# Patient Record
Sex: MALE | Race: WHITE | NOT HISPANIC OR LATINO | Employment: OTHER | URBAN - METROPOLITAN AREA
[De-identification: names, ages, dates, MRNs, and addresses within clinical notes are randomized per-mention and may not be internally consistent; named-entity substitution may affect disease eponyms.]

---

## 2017-01-01 ENCOUNTER — OFFICE VISIT (OUTPATIENT)
Dept: CARDIOLOGY | Facility: CLINIC | Age: 72
End: 2017-01-01

## 2017-01-01 ENCOUNTER — PATIENT OUTREACH (OUTPATIENT)
Dept: CASE MANAGEMENT | Facility: OTHER | Age: 72
End: 2017-01-01

## 2017-01-01 ENCOUNTER — TELEPHONE (OUTPATIENT)
Dept: FAMILY MEDICINE CLINIC | Facility: CLINIC | Age: 72
End: 2017-01-01

## 2017-01-01 ENCOUNTER — APPOINTMENT (OUTPATIENT)
Dept: CT IMAGING | Facility: HOSPITAL | Age: 72
End: 2017-01-01

## 2017-01-01 ENCOUNTER — APPOINTMENT (OUTPATIENT)
Dept: CARDIAC REHAB | Facility: HOSPITAL | Age: 72
End: 2017-01-01

## 2017-01-01 ENCOUNTER — OFFICE VISIT (OUTPATIENT)
Dept: FAMILY MEDICINE CLINIC | Facility: CLINIC | Age: 72
End: 2017-01-01

## 2017-01-01 ENCOUNTER — APPOINTMENT (OUTPATIENT)
Dept: CARDIOLOGY | Facility: HOSPITAL | Age: 72
End: 2017-01-01
Attending: INTERNAL MEDICINE

## 2017-01-01 ENCOUNTER — HOSPITAL ENCOUNTER (INPATIENT)
Facility: HOSPITAL | Age: 72
LOS: 3 days | Discharge: HOME | End: 2017-02-03
Attending: FAMILY MEDICINE | Admitting: INTERNAL MEDICINE

## 2017-01-01 ENCOUNTER — HOSPITAL ENCOUNTER (EMERGENCY)
Facility: HOSPITAL | Age: 72
End: 2017-11-20
Attending: FAMILY MEDICINE | Admitting: FAMILY MEDICINE

## 2017-01-01 ENCOUNTER — HOSPITAL ENCOUNTER (OUTPATIENT)
Dept: CARDIOLOGY | Facility: HOSPITAL | Age: 72
Discharge: HOME | End: 2017-06-29

## 2017-01-01 ENCOUNTER — HOSPITAL ENCOUNTER (OUTPATIENT)
Dept: CARDIOLOGY | Facility: HOSPITAL | Age: 72
Discharge: HOME | End: 2017-07-06
Admitting: INTERNAL MEDICINE

## 2017-01-01 ENCOUNTER — HOSPITAL ENCOUNTER (OUTPATIENT)
Dept: CARDIOLOGY | Facility: HOSPITAL | Age: 72
Discharge: HOME | End: 2017-07-10
Admitting: INTERNAL MEDICINE

## 2017-01-01 ENCOUNTER — TRANSCRIBE ORDERS (OUTPATIENT)
Dept: CARDIOLOGY | Facility: CLINIC | Age: 72
End: 2017-01-01

## 2017-01-01 ENCOUNTER — HOSPITAL ENCOUNTER (OUTPATIENT)
Dept: CARDIOLOGY | Facility: HOSPITAL | Age: 72
Discharge: HOME | End: 2017-08-01
Admitting: INTERNAL MEDICINE

## 2017-01-01 ENCOUNTER — OFFICE VISIT (OUTPATIENT)
Dept: GASTROENTEROLOGY | Facility: CLINIC | Age: 72
End: 2017-01-01

## 2017-01-01 ENCOUNTER — TRANSCRIBE ORDERS (OUTPATIENT)
Dept: ADMINISTRATIVE | Facility: HOSPITAL | Age: 72
End: 2017-01-01

## 2017-01-01 ENCOUNTER — APPOINTMENT (OUTPATIENT)
Dept: CARDIOLOGY | Facility: HOSPITAL | Age: 72
End: 2017-01-01
Attending: EMERGENCY MEDICINE

## 2017-01-01 ENCOUNTER — ANESTHESIA EVENT (OUTPATIENT)
Dept: CARDIOLOGY | Facility: HOSPITAL | Age: 72
End: 2017-01-01

## 2017-01-01 ENCOUNTER — HOSPITAL ENCOUNTER (EMERGENCY)
Facility: HOSPITAL | Age: 72
Discharge: HOME | End: 2017-09-04
Attending: EMERGENCY MEDICINE | Admitting: EMERGENCY MEDICINE

## 2017-01-01 ENCOUNTER — TELEPHONE (OUTPATIENT)
Dept: SOCIAL WORK | Facility: HOSPITAL | Age: 72
End: 2017-01-01

## 2017-01-01 ENCOUNTER — HOSPITAL ENCOUNTER (EMERGENCY)
Facility: HOSPITAL | Age: 72
Discharge: HOME | End: 2017-05-12
Attending: EMERGENCY MEDICINE | Admitting: EMERGENCY MEDICINE

## 2017-01-01 ENCOUNTER — TELEPHONE (OUTPATIENT)
Dept: CARDIOLOGY | Facility: CLINIC | Age: 72
End: 2017-01-01

## 2017-01-01 ENCOUNTER — HOSPITAL ENCOUNTER (OUTPATIENT)
Dept: CARDIOLOGY | Facility: HOSPITAL | Age: 72
Discharge: HOME | End: 2017-05-02
Attending: INTERNAL MEDICINE

## 2017-01-01 ENCOUNTER — PATIENT MESSAGE (OUTPATIENT)
Dept: CARDIOLOGY | Facility: CLINIC | Age: 72
End: 2017-01-01

## 2017-01-01 ENCOUNTER — APPOINTMENT (OUTPATIENT)
Dept: GENERAL RADIOLOGY | Facility: HOSPITAL | Age: 72
End: 2017-01-01

## 2017-01-01 ENCOUNTER — APPOINTMENT (OUTPATIENT)
Dept: RESPIRATORY THERAPY | Facility: HOSPITAL | Age: 72
End: 2017-01-01
Attending: INTERNAL MEDICINE

## 2017-01-01 ENCOUNTER — HOSPITAL ENCOUNTER (OUTPATIENT)
Dept: NUCLEAR MEDICINE | Facility: HOSPITAL | Age: 72
Discharge: HOME | End: 2017-04-06
Attending: INTERNAL MEDICINE

## 2017-01-01 ENCOUNTER — APPOINTMENT (OUTPATIENT)
Dept: NUCLEAR MEDICINE | Facility: HOSPITAL | Age: 72
End: 2017-01-01

## 2017-01-01 ENCOUNTER — HOSPITAL ENCOUNTER (EMERGENCY)
Facility: HOSPITAL | Age: 72
Discharge: HOME | End: 2017-03-27
Attending: EMERGENCY MEDICINE | Admitting: EMERGENCY MEDICINE

## 2017-01-01 ENCOUNTER — HOSPITAL ENCOUNTER (OUTPATIENT)
Dept: CARDIOLOGY | Facility: HOSPITAL | Age: 72
Discharge: HOME | End: 2017-08-08
Admitting: INTERNAL MEDICINE

## 2017-01-01 ENCOUNTER — HOSPITAL ENCOUNTER (OUTPATIENT)
Dept: CARDIOLOGY | Facility: HOSPITAL | Age: 72
Discharge: HOME | End: 2017-05-23
Attending: INTERNAL MEDICINE | Admitting: INTERNAL MEDICINE

## 2017-01-01 ENCOUNTER — HOSPITAL ENCOUNTER (OUTPATIENT)
Facility: HOSPITAL | Age: 72
Setting detail: OBSERVATION
LOS: 1 days | Discharge: HOME | End: 2017-01-19
Attending: EMERGENCY MEDICINE | Admitting: INTERNAL MEDICINE

## 2017-01-01 ENCOUNTER — HOSPITAL ENCOUNTER (EMERGENCY)
Facility: HOSPITAL | Age: 72
Discharge: HOME | End: 2017-11-20
Attending: EMERGENCY MEDICINE | Admitting: EMERGENCY MEDICINE

## 2017-01-01 ENCOUNTER — HOSPITAL ENCOUNTER (OUTPATIENT)
Dept: CT IMAGING | Facility: HOSPITAL | Age: 72
Discharge: HOME | End: 2017-02-21
Attending: INTERNAL MEDICINE | Admitting: INTERNAL MEDICINE

## 2017-01-01 ENCOUNTER — HOSPITAL ENCOUNTER (EMERGENCY)
Facility: HOSPITAL | Age: 72
Discharge: HOME | End: 2017-11-13
Attending: EMERGENCY MEDICINE | Admitting: EMERGENCY MEDICINE

## 2017-01-01 ENCOUNTER — APPOINTMENT (OUTPATIENT)
Dept: CARDIOLOGY | Facility: HOSPITAL | Age: 72
End: 2017-01-01

## 2017-01-01 ENCOUNTER — HOSPITAL ENCOUNTER (EMERGENCY)
Facility: HOSPITAL | Age: 72
Discharge: HOME | End: 2017-02-22
Attending: EMERGENCY MEDICINE | Admitting: EMERGENCY MEDICINE

## 2017-01-01 ENCOUNTER — APPOINTMENT (OUTPATIENT)
Dept: GENERAL RADIOLOGY | Facility: HOSPITAL | Age: 72
End: 2017-01-01
Attending: INTERNAL MEDICINE

## 2017-01-01 ENCOUNTER — HOSPITAL ENCOUNTER (INPATIENT)
Facility: HOSPITAL | Age: 72
LOS: 6 days | Discharge: HOME | End: 2017-08-28
Attending: EMERGENCY MEDICINE | Admitting: INTERNAL MEDICINE

## 2017-01-01 ENCOUNTER — HOSPITAL ENCOUNTER (EMERGENCY)
Facility: HOSPITAL | Age: 72
Discharge: HOME | End: 2017-02-12
Attending: EMERGENCY MEDICINE | Admitting: EMERGENCY MEDICINE

## 2017-01-01 ENCOUNTER — HOSPITAL ENCOUNTER (INPATIENT)
Facility: HOSPITAL | Age: 72
LOS: 9 days | Discharge: HOME | End: 2017-06-28
Attending: EMERGENCY MEDICINE | Admitting: INTERNAL MEDICINE

## 2017-01-01 ENCOUNTER — HOSPITAL ENCOUNTER (INPATIENT)
Facility: HOSPITAL | Age: 72
LOS: 4 days | Discharge: HOME | End: 2017-01-24
Attending: EMERGENCY MEDICINE | Admitting: INTERNAL MEDICINE

## 2017-01-01 ENCOUNTER — EPISODE CHANGES (OUTPATIENT)
Dept: CASE MANAGEMENT | Facility: OTHER | Age: 72
End: 2017-01-01

## 2017-01-01 ENCOUNTER — HOSPITAL ENCOUNTER (OUTPATIENT)
Dept: CARDIOLOGY | Facility: HOSPITAL | Age: 72
Discharge: HOME | End: 2017-07-20
Admitting: INTERNAL MEDICINE

## 2017-01-01 ENCOUNTER — ANESTHESIA (OUTPATIENT)
Dept: CARDIOLOGY | Facility: HOSPITAL | Age: 72
End: 2017-01-01

## 2017-01-01 ENCOUNTER — HOSPITAL ENCOUNTER (EMERGENCY)
Facility: HOSPITAL | Age: 72
Discharge: HOME | End: 2017-08-31
Attending: EMERGENCY MEDICINE | Admitting: EMERGENCY MEDICINE

## 2017-01-01 VITALS
BODY MASS INDEX: 33.92 KG/M2 | HEART RATE: 67 BPM | HEIGHT: 69 IN | SYSTOLIC BLOOD PRESSURE: 125 MMHG | DIASTOLIC BLOOD PRESSURE: 81 MMHG | TEMPERATURE: 97.5 F | RESPIRATION RATE: 18 BRPM | OXYGEN SATURATION: 98 % | WEIGHT: 229 LBS

## 2017-01-01 VITALS — DIASTOLIC BLOOD PRESSURE: 90 MMHG | SYSTOLIC BLOOD PRESSURE: 174 MMHG

## 2017-01-01 VITALS
TEMPERATURE: 97.5 F | BODY MASS INDEX: 35.4 KG/M2 | OXYGEN SATURATION: 90 % | HEIGHT: 69 IN | HEART RATE: 63 BPM | SYSTOLIC BLOOD PRESSURE: 108 MMHG | HEART RATE: 73 BPM | WEIGHT: 239 LBS | HEIGHT: 69 IN | DIASTOLIC BLOOD PRESSURE: 70 MMHG | SYSTOLIC BLOOD PRESSURE: 120 MMHG | DIASTOLIC BLOOD PRESSURE: 58 MMHG

## 2017-01-01 VITALS
HEIGHT: 72 IN | WEIGHT: 245 LBS | HEART RATE: 66 BPM | SYSTOLIC BLOOD PRESSURE: 121 MMHG | DIASTOLIC BLOOD PRESSURE: 71 MMHG | OXYGEN SATURATION: 94 % | BODY MASS INDEX: 33.18 KG/M2 | TEMPERATURE: 98 F | RESPIRATION RATE: 20 BRPM

## 2017-01-01 VITALS
BODY MASS INDEX: 35.55 KG/M2 | HEART RATE: 68 BPM | SYSTOLIC BLOOD PRESSURE: 122 MMHG | DIASTOLIC BLOOD PRESSURE: 70 MMHG | TEMPERATURE: 97.3 F | HEIGHT: 69 IN | WEIGHT: 240 LBS | OXYGEN SATURATION: 94 %

## 2017-01-01 VITALS
BODY MASS INDEX: 36.64 KG/M2 | SYSTOLIC BLOOD PRESSURE: 138 MMHG | TEMPERATURE: 97.5 F | DIASTOLIC BLOOD PRESSURE: 80 MMHG | OXYGEN SATURATION: 90 % | HEART RATE: 78 BPM | WEIGHT: 247.4 LBS | HEIGHT: 69 IN

## 2017-01-01 VITALS
HEIGHT: 69 IN | WEIGHT: 234 LBS | TEMPERATURE: 98 F | BODY MASS INDEX: 34.66 KG/M2 | HEART RATE: 63 BPM | OXYGEN SATURATION: 91 % | SYSTOLIC BLOOD PRESSURE: 150 MMHG | DIASTOLIC BLOOD PRESSURE: 80 MMHG

## 2017-01-01 VITALS
HEART RATE: 62 BPM | WEIGHT: 238.05 LBS | OXYGEN SATURATION: 94 % | TEMPERATURE: 97.7 F | DIASTOLIC BLOOD PRESSURE: 62 MMHG | BODY MASS INDEX: 35.26 KG/M2 | HEIGHT: 69 IN | RESPIRATION RATE: 18 BRPM | SYSTOLIC BLOOD PRESSURE: 98 MMHG

## 2017-01-01 VITALS
DIASTOLIC BLOOD PRESSURE: 60 MMHG | HEART RATE: 102 BPM | SYSTOLIC BLOOD PRESSURE: 120 MMHG | TEMPERATURE: 97.6 F | OXYGEN SATURATION: 92 % | HEIGHT: 69 IN

## 2017-01-01 VITALS
HEIGHT: 69 IN | WEIGHT: 244 LBS | SYSTOLIC BLOOD PRESSURE: 110 MMHG | HEART RATE: 72 BPM | DIASTOLIC BLOOD PRESSURE: 68 MMHG | BODY MASS INDEX: 36.14 KG/M2

## 2017-01-01 VITALS
BODY MASS INDEX: 35.55 KG/M2 | HEART RATE: 58 BPM | HEIGHT: 69 IN | DIASTOLIC BLOOD PRESSURE: 71 MMHG | WEIGHT: 240 LBS | SYSTOLIC BLOOD PRESSURE: 122 MMHG | TEMPERATURE: 98.3 F | RESPIRATION RATE: 18 BRPM | OXYGEN SATURATION: 96 %

## 2017-01-01 VITALS
TEMPERATURE: 98.2 F | HEIGHT: 69 IN | OXYGEN SATURATION: 92 % | WEIGHT: 235 LBS | HEART RATE: 63 BPM | RESPIRATION RATE: 19 BRPM | DIASTOLIC BLOOD PRESSURE: 63 MMHG | BODY MASS INDEX: 34.8 KG/M2 | SYSTOLIC BLOOD PRESSURE: 126 MMHG

## 2017-01-01 VITALS
BODY MASS INDEX: 36.26 KG/M2 | TEMPERATURE: 98.2 F | DIASTOLIC BLOOD PRESSURE: 46 MMHG | HEART RATE: 74 BPM | HEIGHT: 69 IN | OXYGEN SATURATION: 90 % | WEIGHT: 244.8 LBS | SYSTOLIC BLOOD PRESSURE: 88 MMHG

## 2017-01-01 VITALS
HEIGHT: 69 IN | BODY MASS INDEX: 35.9 KG/M2 | WEIGHT: 242.4 LBS | RESPIRATION RATE: 18 BRPM | OXYGEN SATURATION: 95 % | TEMPERATURE: 97.6 F | DIASTOLIC BLOOD PRESSURE: 51 MMHG | SYSTOLIC BLOOD PRESSURE: 110 MMHG | HEART RATE: 66 BPM

## 2017-01-01 VITALS
DIASTOLIC BLOOD PRESSURE: 79 MMHG | SYSTOLIC BLOOD PRESSURE: 132 MMHG | WEIGHT: 242 LBS | HEART RATE: 61 BPM | BODY MASS INDEX: 35.74 KG/M2

## 2017-01-01 VITALS
TEMPERATURE: 97.9 F | HEART RATE: 90 BPM | RESPIRATION RATE: 18 BRPM | BODY MASS INDEX: 36.73 KG/M2 | OXYGEN SATURATION: 96 % | HEIGHT: 69 IN | SYSTOLIC BLOOD PRESSURE: 130 MMHG | WEIGHT: 248 LBS | DIASTOLIC BLOOD PRESSURE: 85 MMHG

## 2017-01-01 VITALS
HEART RATE: 76 BPM | HEIGHT: 69 IN | TEMPERATURE: 97.6 F | WEIGHT: 243 LBS | OXYGEN SATURATION: 92 % | BODY MASS INDEX: 35.99 KG/M2 | SYSTOLIC BLOOD PRESSURE: 98 MMHG | DIASTOLIC BLOOD PRESSURE: 68 MMHG

## 2017-01-01 VITALS
HEIGHT: 69 IN | BODY MASS INDEX: 35.55 KG/M2 | WEIGHT: 240 LBS | OXYGEN SATURATION: 91 % | HEART RATE: 62 BPM | DIASTOLIC BLOOD PRESSURE: 78 MMHG | TEMPERATURE: 97.9 F | SYSTOLIC BLOOD PRESSURE: 110 MMHG

## 2017-01-01 VITALS
BODY MASS INDEX: 34.96 KG/M2 | OXYGEN SATURATION: 95 % | DIASTOLIC BLOOD PRESSURE: 79 MMHG | HEIGHT: 69 IN | WEIGHT: 236 LBS | RESPIRATION RATE: 16 BRPM | HEART RATE: 67 BPM | SYSTOLIC BLOOD PRESSURE: 124 MMHG | TEMPERATURE: 98 F

## 2017-01-01 VITALS
OXYGEN SATURATION: 90 % | DIASTOLIC BLOOD PRESSURE: 62 MMHG | TEMPERATURE: 98.2 F | WEIGHT: 240 LBS | HEART RATE: 62 BPM | HEIGHT: 69 IN | SYSTOLIC BLOOD PRESSURE: 110 MMHG | BODY MASS INDEX: 35.55 KG/M2

## 2017-01-01 VITALS
DIASTOLIC BLOOD PRESSURE: 70 MMHG | BODY MASS INDEX: 35.55 KG/M2 | WEIGHT: 240 LBS | SYSTOLIC BLOOD PRESSURE: 127 MMHG | RESPIRATION RATE: 17 BRPM | HEART RATE: 65 BPM | OXYGEN SATURATION: 96 % | HEIGHT: 69 IN | TEMPERATURE: 98.2 F

## 2017-01-01 VITALS
SYSTOLIC BLOOD PRESSURE: 122 MMHG | DIASTOLIC BLOOD PRESSURE: 78 MMHG | WEIGHT: 248 LBS | TEMPERATURE: 97.2 F | HEART RATE: 62 BPM | HEIGHT: 69 IN | BODY MASS INDEX: 36.73 KG/M2 | OXYGEN SATURATION: 90 % | RESPIRATION RATE: 18 BRPM

## 2017-01-01 VITALS
DIASTOLIC BLOOD PRESSURE: 68 MMHG | RESPIRATION RATE: 18 BRPM | HEIGHT: 69 IN | HEART RATE: 62 BPM | TEMPERATURE: 98 F | WEIGHT: 238 LBS | OXYGEN SATURATION: 94 % | BODY MASS INDEX: 35.25 KG/M2 | SYSTOLIC BLOOD PRESSURE: 126 MMHG

## 2017-01-01 VITALS
WEIGHT: 247 LBS | BODY MASS INDEX: 33.5 KG/M2 | HEART RATE: 83 BPM | DIASTOLIC BLOOD PRESSURE: 70 MMHG | SYSTOLIC BLOOD PRESSURE: 119 MMHG

## 2017-01-01 VITALS
TEMPERATURE: 97.9 F | SYSTOLIC BLOOD PRESSURE: 120 MMHG | HEART RATE: 78 BPM | BODY MASS INDEX: 35.96 KG/M2 | DIASTOLIC BLOOD PRESSURE: 62 MMHG | WEIGHT: 242.8 LBS | HEIGHT: 69 IN | OXYGEN SATURATION: 87 %

## 2017-01-01 VITALS
SYSTOLIC BLOOD PRESSURE: 152 MMHG | HEART RATE: 70 BPM | HEIGHT: 69 IN | WEIGHT: 240.2 LBS | TEMPERATURE: 97.6 F | DIASTOLIC BLOOD PRESSURE: 90 MMHG | OXYGEN SATURATION: 90 % | BODY MASS INDEX: 35.58 KG/M2

## 2017-01-01 VITALS
HEIGHT: 69 IN | DIASTOLIC BLOOD PRESSURE: 78 MMHG | BODY MASS INDEX: 37.03 KG/M2 | SYSTOLIC BLOOD PRESSURE: 122 MMHG | HEART RATE: 81 BPM | WEIGHT: 250 LBS

## 2017-01-01 VITALS
HEART RATE: 61 BPM | SYSTOLIC BLOOD PRESSURE: 122 MMHG | DIASTOLIC BLOOD PRESSURE: 64 MMHG | TEMPERATURE: 97.4 F | BODY MASS INDEX: 36.58 KG/M2 | HEIGHT: 69 IN | OXYGEN SATURATION: 88 % | WEIGHT: 247 LBS

## 2017-01-01 VITALS
HEIGHT: 69 IN | TEMPERATURE: 97.5 F | SYSTOLIC BLOOD PRESSURE: 120 MMHG | BODY MASS INDEX: 35.62 KG/M2 | OXYGEN SATURATION: 94 % | HEART RATE: 66 BPM | WEIGHT: 240.5 LBS | DIASTOLIC BLOOD PRESSURE: 70 MMHG

## 2017-01-01 VITALS
HEIGHT: 69 IN | DIASTOLIC BLOOD PRESSURE: 73 MMHG | RESPIRATION RATE: 16 BRPM | HEART RATE: 64 BPM | WEIGHT: 235 LBS | OXYGEN SATURATION: 96 % | TEMPERATURE: 97.6 F | SYSTOLIC BLOOD PRESSURE: 128 MMHG | BODY MASS INDEX: 34.8 KG/M2

## 2017-01-01 VITALS — WEIGHT: 246 LBS | SYSTOLIC BLOOD PRESSURE: 122 MMHG | BODY MASS INDEX: 36.33 KG/M2 | DIASTOLIC BLOOD PRESSURE: 72 MMHG

## 2017-01-01 VITALS
HEART RATE: 83 BPM | OXYGEN SATURATION: 93 % | SYSTOLIC BLOOD PRESSURE: 139 MMHG | RESPIRATION RATE: 20 BRPM | TEMPERATURE: 98.1 F | DIASTOLIC BLOOD PRESSURE: 88 MMHG | BODY MASS INDEX: 34.8 KG/M2 | HEIGHT: 69 IN | WEIGHT: 235 LBS

## 2017-01-01 DIAGNOSIS — E78.2 MIXED HYPERLIPIDEMIA: ICD-10-CM

## 2017-01-01 DIAGNOSIS — I50.32 CHRONIC DIASTOLIC CONGESTIVE HEART FAILURE (HCC): ICD-10-CM

## 2017-01-01 DIAGNOSIS — Z51.81 ANTICOAGULATION GOAL OF INR 2.0 TO 2.5: ICD-10-CM

## 2017-01-01 DIAGNOSIS — R09.02 EXERCISE HYPOXEMIA: ICD-10-CM

## 2017-01-01 DIAGNOSIS — L30.9 DERMATITIS: Primary | ICD-10-CM

## 2017-01-01 DIAGNOSIS — T46.2X5A AMIODARONE PULMONARY TOXICITY: ICD-10-CM

## 2017-01-01 DIAGNOSIS — I45.10 BUNDLE BRANCH BLOCK, RIGHT: Primary | ICD-10-CM

## 2017-01-01 DIAGNOSIS — R09.02 HYPOXIA: ICD-10-CM

## 2017-01-01 DIAGNOSIS — Z91.199 MEDICALLY NONCOMPLIANT: ICD-10-CM

## 2017-01-01 DIAGNOSIS — I47.9 TACHYCARDIA, PAROXYSMAL (HCC): ICD-10-CM

## 2017-01-01 DIAGNOSIS — J84.9 INTERSTITIAL LUNG DISEASE (HCC): ICD-10-CM

## 2017-01-01 DIAGNOSIS — J06.9 VIRAL URI WITH COUGH: Primary | ICD-10-CM

## 2017-01-01 DIAGNOSIS — I50.40 COMBINED SYSTOLIC AND DIASTOLIC CONGESTIVE HEART FAILURE, UNSPECIFIED CONGESTIVE HEART FAILURE CHRONICITY: ICD-10-CM

## 2017-01-01 DIAGNOSIS — R07.2 PRECORDIAL PAIN: Primary | ICD-10-CM

## 2017-01-01 DIAGNOSIS — D68.9 COAGULOPATHY (HCC): ICD-10-CM

## 2017-01-01 DIAGNOSIS — I50.31 ACUTE DIASTOLIC CONGESTIVE HEART FAILURE (HCC): ICD-10-CM

## 2017-01-01 DIAGNOSIS — Z99.81 REQUIRES CONTINUOUS AT HOME SUPPLEMENTAL OXYGEN: Primary | ICD-10-CM

## 2017-01-01 DIAGNOSIS — I20.8 STABLE ANGINA (HCC): Primary | ICD-10-CM

## 2017-01-01 DIAGNOSIS — I46.9 CARDIAC ARREST (HCC): Primary | ICD-10-CM

## 2017-01-01 DIAGNOSIS — R06.02 SOB (SHORTNESS OF BREATH): ICD-10-CM

## 2017-01-01 DIAGNOSIS — J01.10 ACUTE NON-RECURRENT FRONTAL SINUSITIS: Primary | ICD-10-CM

## 2017-01-01 DIAGNOSIS — Z51.81 ANTICOAGULATION GOAL OF INR 2 TO 3: ICD-10-CM

## 2017-01-01 DIAGNOSIS — J96.01 ACUTE RESPIRATORY FAILURE WITH HYPOXIA (HCC): Primary | ICD-10-CM

## 2017-01-01 DIAGNOSIS — I25.10 CORONARY ARTERY DISEASE INVOLVING NATIVE CORONARY ARTERY OF NATIVE HEART WITHOUT ANGINA PECTORIS: ICD-10-CM

## 2017-01-01 DIAGNOSIS — D64.9 ANEMIA, UNSPECIFIED TYPE: ICD-10-CM

## 2017-01-01 DIAGNOSIS — T78.40XA ALLERGIC REACTION, INITIAL ENCOUNTER: Primary | ICD-10-CM

## 2017-01-01 DIAGNOSIS — Z01.810 PRE-OPERATIVE CARDIOVASCULAR EXAMINATION: Primary | ICD-10-CM

## 2017-01-01 DIAGNOSIS — I48.0 PAROXYSMAL ATRIAL FIBRILLATION (HCC): ICD-10-CM

## 2017-01-01 DIAGNOSIS — R06.09 DYSPNEA ON EXERTION: ICD-10-CM

## 2017-01-01 DIAGNOSIS — J98.4 AMIODARONE PULMONARY TOXICITY: ICD-10-CM

## 2017-01-01 DIAGNOSIS — G89.18 POSTOPERATIVE PAIN OF EXTREMITY: Primary | ICD-10-CM

## 2017-01-01 DIAGNOSIS — Z00.00 ENCOUNTER FOR MEDICARE ANNUAL WELLNESS EXAM: Primary | ICD-10-CM

## 2017-01-01 DIAGNOSIS — I27.20 PULMONARY HYPERTENSION (HCC): ICD-10-CM

## 2017-01-01 DIAGNOSIS — R04.0 ACUTE ANTERIOR EPISTAXIS: Primary | ICD-10-CM

## 2017-01-01 DIAGNOSIS — I48.0 PAROXYSMAL A-FIB (HCC): Primary | ICD-10-CM

## 2017-01-01 DIAGNOSIS — R07.9 CHEST PAIN, UNSPECIFIED TYPE: Primary | ICD-10-CM

## 2017-01-01 DIAGNOSIS — E66.09 NON MORBID OBESITY DUE TO EXCESS CALORIES: ICD-10-CM

## 2017-01-01 DIAGNOSIS — J84.10 PULMONARY FIBROSIS (HCC): Primary | ICD-10-CM

## 2017-01-01 DIAGNOSIS — I48.0 PAROXYSMAL A-FIB (HCC): ICD-10-CM

## 2017-01-01 DIAGNOSIS — I48.0 PAROXYSMAL ATRIAL FIBRILLATION (HCC): Primary | ICD-10-CM

## 2017-01-01 DIAGNOSIS — I45.10 BUNDLE BRANCH BLOCK, RIGHT: ICD-10-CM

## 2017-01-01 DIAGNOSIS — N18.2 CKD (CHRONIC KIDNEY DISEASE) STAGE 2, GFR 60-89 ML/MIN: ICD-10-CM

## 2017-01-01 DIAGNOSIS — J84.9 ILD (INTERSTITIAL LUNG DISEASE) (HCC): Primary | ICD-10-CM

## 2017-01-01 DIAGNOSIS — Z13.6 ENCOUNTER FOR LIPID SCREENING FOR CARDIOVASCULAR DISEASE: ICD-10-CM

## 2017-01-01 DIAGNOSIS — R73.9 BLOOD GLUCOSE ELEVATED: ICD-10-CM

## 2017-01-01 DIAGNOSIS — J84.10 PULMONARY FIBROSIS (HCC): ICD-10-CM

## 2017-01-01 DIAGNOSIS — Z79.01 ANTICOAGULATION GOAL OF INR 2 TO 3: ICD-10-CM

## 2017-01-01 DIAGNOSIS — I48.91 ATRIAL FIBRILLATION, UNSPECIFIED TYPE (HCC): ICD-10-CM

## 2017-01-01 DIAGNOSIS — R13.10 DYSPHAGIA, UNSPECIFIED TYPE: Primary | ICD-10-CM

## 2017-01-01 DIAGNOSIS — M10.00 IDIOPATHIC GOUT, UNSPECIFIED CHRONICITY, UNSPECIFIED SITE: ICD-10-CM

## 2017-01-01 DIAGNOSIS — J84.10 PULMONARY INTERSTITIAL FIBROSIS (HCC): ICD-10-CM

## 2017-01-01 DIAGNOSIS — R42 LIGHTHEADEDNESS: ICD-10-CM

## 2017-01-01 DIAGNOSIS — I48.91 ATRIAL FIBRILLATION, UNSPECIFIED TYPE (HCC): Primary | ICD-10-CM

## 2017-01-01 DIAGNOSIS — R07.89 OTHER CHEST PAIN: Primary | ICD-10-CM

## 2017-01-01 DIAGNOSIS — R05.9 COUGH: ICD-10-CM

## 2017-01-01 DIAGNOSIS — D64.9 ANEMIA, UNSPECIFIED TYPE: Primary | ICD-10-CM

## 2017-01-01 DIAGNOSIS — R94.31 ABNORMAL EKG: ICD-10-CM

## 2017-01-01 DIAGNOSIS — I48.92 ATRIAL FLUTTER WITH RAPID VENTRICULAR RESPONSE (HCC): Primary | ICD-10-CM

## 2017-01-01 DIAGNOSIS — J96.21 ACUTE AND CHRONIC RESPIRATORY FAILURE WITH HYPOXIA (HCC): ICD-10-CM

## 2017-01-01 DIAGNOSIS — I25.10 CORONARY ARTERY DISEASE INVOLVING NATIVE CORONARY ARTERY OF NATIVE HEART WITHOUT ANGINA PECTORIS: Primary | ICD-10-CM

## 2017-01-01 DIAGNOSIS — R07.89 CHEST DISCOMFORT: ICD-10-CM

## 2017-01-01 DIAGNOSIS — R60.0 BILATERAL LOWER EXTREMITY EDEMA: Primary | ICD-10-CM

## 2017-01-01 DIAGNOSIS — Z79.01 ANTICOAGULATION GOAL OF INR 2.0 TO 2.5: ICD-10-CM

## 2017-01-01 DIAGNOSIS — K22.4 ESOPHAGEAL SPASM: ICD-10-CM

## 2017-01-01 DIAGNOSIS — I48.91 ATRIAL FIBRILLATION STATUS POST CARDIOVERSION (HCC): Primary | ICD-10-CM

## 2017-01-01 DIAGNOSIS — I10 HYPERTENSION, ESSENTIAL: ICD-10-CM

## 2017-01-01 DIAGNOSIS — J84.9 ILD (INTERSTITIAL LUNG DISEASE) (HCC): ICD-10-CM

## 2017-01-01 DIAGNOSIS — I10 ESSENTIAL HYPERTENSION: ICD-10-CM

## 2017-01-01 DIAGNOSIS — G47.33 OSA (OBSTRUCTIVE SLEEP APNEA): ICD-10-CM

## 2017-01-01 DIAGNOSIS — M79.609 POSTOPERATIVE PAIN OF EXTREMITY: Primary | ICD-10-CM

## 2017-01-01 DIAGNOSIS — R07.9 EXERTIONAL CHEST PAIN: Primary | ICD-10-CM

## 2017-01-01 DIAGNOSIS — I50.42 CHRONIC COMBINED SYSTOLIC AND DIASTOLIC CONGESTIVE HEART FAILURE (HCC): ICD-10-CM

## 2017-01-01 DIAGNOSIS — E87.6 HYPOKALEMIA: ICD-10-CM

## 2017-01-01 DIAGNOSIS — Z13.220 ENCOUNTER FOR LIPID SCREENING FOR CARDIOVASCULAR DISEASE: ICD-10-CM

## 2017-01-01 DIAGNOSIS — I10 ESSENTIAL HYPERTENSION: Primary | ICD-10-CM

## 2017-01-01 DIAGNOSIS — E78.49 OTHER HYPERLIPIDEMIA: ICD-10-CM

## 2017-01-01 LAB
ACT BLD: 240 SECONDS (ref 82–152)
ACT BLD: 263 SECONDS (ref 82–152)
ACT BLD: 281 SECONDS (ref 82–152)
ACT BLD: 291 SECONDS (ref 82–152)
ACT BLD: 301 SECONDS (ref 82–152)
ACT BLD: 312 SECONDS (ref 82–152)
ACT BLD: 322 SECONDS (ref 82–152)
ACT BLD: 327 SECONDS (ref 82–152)
ALBUMIN SERPL-MCNC: 3.7 G/DL (ref 3.5–5.2)
ALBUMIN SERPL-MCNC: 3.8 G/DL (ref 3.5–5.2)
ALBUMIN SERPL-MCNC: 3.9 G/DL (ref 3.5–5.2)
ALBUMIN SERPL-MCNC: 3.9 G/DL (ref 3.5–5.2)
ALBUMIN SERPL-MCNC: 4 G/DL (ref 3.5–5.2)
ALBUMIN SERPL-MCNC: 4 G/DL (ref 3.5–5.2)
ALBUMIN SERPL-MCNC: 4.1 G/DL (ref 3.5–5.2)
ALBUMIN SERPL-MCNC: 4.2 G/DL (ref 3.5–5.2)
ALBUMIN SERPL-MCNC: 4.4 G/DL (ref 3.5–5.2)
ALBUMIN SERPL-MCNC: 4.5 G/DL (ref 3.5–5.2)
ALBUMIN SERPL-MCNC: 4.6 G/DL (ref 3.5–5.2)
ALBUMIN/GLOB SERPL: 1.3 G/DL
ALBUMIN/GLOB SERPL: 1.3 G/DL
ALBUMIN/GLOB SERPL: 1.4 G/DL
ALBUMIN/GLOB SERPL: 1.5 G/DL
ALBUMIN/GLOB SERPL: 1.6 G/DL
ALBUMIN/GLOB SERPL: 1.8 G/DL
ALP SERPL-CCNC: 57 U/L (ref 39–117)
ALP SERPL-CCNC: 58 U/L (ref 39–117)
ALP SERPL-CCNC: 59 U/L (ref 39–117)
ALP SERPL-CCNC: 59 U/L (ref 39–117)
ALP SERPL-CCNC: 61 U/L (ref 39–117)
ALP SERPL-CCNC: 61 U/L (ref 39–117)
ALP SERPL-CCNC: 63 U/L (ref 39–117)
ALP SERPL-CCNC: 65 U/L (ref 39–117)
ALP SERPL-CCNC: 68 U/L (ref 39–117)
ALP SERPL-CCNC: 72 U/L (ref 39–117)
ALP SERPL-CCNC: 73 U/L (ref 39–117)
ALP SERPL-CCNC: 73 U/L (ref 39–117)
ALP SERPL-CCNC: 76 U/L (ref 39–117)
ALT SERPL W P-5'-P-CCNC: 10 U/L (ref 1–41)
ALT SERPL W P-5'-P-CCNC: 12 U/L (ref 1–41)
ALT SERPL W P-5'-P-CCNC: 14 U/L (ref 1–41)
ALT SERPL W P-5'-P-CCNC: 16 U/L (ref 1–41)
ALT SERPL W P-5'-P-CCNC: 17 U/L (ref 1–41)
ALT SERPL W P-5'-P-CCNC: 18 U/L (ref 1–41)
ALT SERPL W P-5'-P-CCNC: 19 U/L (ref 1–41)
ALT SERPL W P-5'-P-CCNC: 20 U/L (ref 1–41)
ALT SERPL W P-5'-P-CCNC: 27 U/L (ref 1–41)
ALT SERPL W P-5'-P-CCNC: 28 U/L (ref 1–41)
ALT SERPL W P-5'-P-CCNC: 35 U/L (ref 1–41)
AMYLASE SERPL-CCNC: 51 U/L (ref 28–100)
AMYLASE SERPL-CCNC: 60 U/L (ref 28–100)
ANA SER QL: NEGATIVE
ANION GAP SERPL CALCULATED.3IONS-SCNC: 11.2 MMOL/L
ANION GAP SERPL CALCULATED.3IONS-SCNC: 11.3 MMOL/L
ANION GAP SERPL CALCULATED.3IONS-SCNC: 12.6 MMOL/L
ANION GAP SERPL CALCULATED.3IONS-SCNC: 12.8 MMOL/L
ANION GAP SERPL CALCULATED.3IONS-SCNC: 12.8 MMOL/L
ANION GAP SERPL CALCULATED.3IONS-SCNC: 13.3 MMOL/L
ANION GAP SERPL CALCULATED.3IONS-SCNC: 13.3 MMOL/L
ANION GAP SERPL CALCULATED.3IONS-SCNC: 13.5 MMOL/L
ANION GAP SERPL CALCULATED.3IONS-SCNC: 13.6 MMOL/L
ANION GAP SERPL CALCULATED.3IONS-SCNC: 13.6 MMOL/L
ANION GAP SERPL CALCULATED.3IONS-SCNC: 13.8 MMOL/L
ANION GAP SERPL CALCULATED.3IONS-SCNC: 13.9 MMOL/L
ANION GAP SERPL CALCULATED.3IONS-SCNC: 13.9 MMOL/L
ANION GAP SERPL CALCULATED.3IONS-SCNC: 14.2 MMOL/L
ANION GAP SERPL CALCULATED.3IONS-SCNC: 14.4 MMOL/L
ANION GAP SERPL CALCULATED.3IONS-SCNC: 14.6 MMOL/L
ANION GAP SERPL CALCULATED.3IONS-SCNC: 15.2 MMOL/L
ANION GAP SERPL CALCULATED.3IONS-SCNC: 15.2 MMOL/L
ANION GAP SERPL CALCULATED.3IONS-SCNC: 15.3 MMOL/L
ANION GAP SERPL CALCULATED.3IONS-SCNC: 15.5 MMOL/L
ANION GAP SERPL CALCULATED.3IONS-SCNC: 15.6 MMOL/L
ANION GAP SERPL CALCULATED.3IONS-SCNC: 16.3 MMOL/L
ANION GAP SERPL CALCULATED.3IONS-SCNC: 17.5 MMOL/L
ANION GAP SERPL CALCULATED.3IONS-SCNC: 18 MMOL/L
ANION GAP SERPL CALCULATED.3IONS-SCNC: 18.2 MMOL/L
AORTIC ARCH: 2.9 CM
APTT PPP: 31.1 SECONDS (ref 22.7–35.4)
APTT PPP: 34.9 SECONDS (ref 22.7–35.4)
APTT PPP: 38.7 SECONDS (ref 22.7–35.4)
ARTERIAL PATENCY WRIST A: ABNORMAL
ASCENDING AORTA: 2.6 CM
AST SERPL-CCNC: 10 U/L (ref 1–40)
AST SERPL-CCNC: 10 U/L (ref 1–40)
AST SERPL-CCNC: 11 U/L (ref 1–40)
AST SERPL-CCNC: 12 U/L (ref 1–40)
AST SERPL-CCNC: 13 U/L (ref 1–40)
AST SERPL-CCNC: 14 U/L (ref 1–40)
AST SERPL-CCNC: 15 U/L (ref 1–40)
AST SERPL-CCNC: 17 U/L (ref 1–40)
AST SERPL-CCNC: 17 U/L (ref 1–40)
AST SERPL-CCNC: 19 U/L (ref 1–40)
AST SERPL-CCNC: 20 U/L (ref 1–40)
ATMOSPHERIC PRESS: 742.4 MMHG
B PERT DNA SPEC QL NAA+PROBE: NOT DETECTED
BASE EXCESS BLDA CALC-SCNC: 3.3 MMOL/L (ref 0–2)
BASOPHILS # BLD AUTO: 0.01 10*3/MM3 (ref 0–0.2)
BASOPHILS # BLD AUTO: 0.01 10*3/MM3 (ref 0–0.2)
BASOPHILS # BLD AUTO: 0.02 10*3/MM3 (ref 0–0.2)
BASOPHILS # BLD AUTO: 0.03 10*3/MM3 (ref 0–0.2)
BASOPHILS # BLD AUTO: 0.04 10*3/MM3 (ref 0–0.2)
BASOPHILS # BLD AUTO: 0.04 10*3/MM3 (ref 0–0.2)
BASOPHILS # BLD AUTO: 0.05 10*3/MM3 (ref 0–0.2)
BASOPHILS # BLD AUTO: 0.05 10*3/MM3 (ref 0–0.2)
BASOPHILS NFR BLD AUTO: 0.1 % (ref 0–1.5)
BASOPHILS NFR BLD AUTO: 0.1 % (ref 0–1.5)
BASOPHILS NFR BLD AUTO: 0.2 % (ref 0–1.5)
BASOPHILS NFR BLD AUTO: 0.3 % (ref 0–1.5)
BASOPHILS NFR BLD AUTO: 0.4 % (ref 0–1.5)
BASOPHILS NFR BLD AUTO: 0.6 % (ref 0–1.5)
BDY SITE: ABNORMAL
BH CV ECHO MEAS - ACS: 1.9 CM
BH CV ECHO MEAS - ACS: 2 CM
BH CV ECHO MEAS - ACS: 2.1 CM
BH CV ECHO MEAS - AO MAX PG (FULL): 5 MMHG
BH CV ECHO MEAS - AO MAX PG: 5 MMHG
BH CV ECHO MEAS - AO MEAN PG (FULL): 0 MMHG
BH CV ECHO MEAS - AO MEAN PG (FULL): 0 MMHG
BH CV ECHO MEAS - AO MEAN PG (FULL): 1 MMHG
BH CV ECHO MEAS - AO MEAN PG: 1 MMHG
BH CV ECHO MEAS - AO MEAN PG: 2 MMHG
BH CV ECHO MEAS - AO MEAN PG: 3 MMHG
BH CV ECHO MEAS - AO ROOT AREA (BSA CORRECTED): 1.4
BH CV ECHO MEAS - AO ROOT AREA (BSA CORRECTED): 1.4
BH CV ECHO MEAS - AO ROOT AREA (BSA CORRECTED): 1.5
BH CV ECHO MEAS - AO ROOT AREA: 7.5 CM^2
BH CV ECHO MEAS - AO ROOT AREA: 7.5 CM^2
BH CV ECHO MEAS - AO ROOT AREA: 8.6 CM^2
BH CV ECHO MEAS - AO ROOT DIAM: 3.1 CM
BH CV ECHO MEAS - AO ROOT DIAM: 3.1 CM
BH CV ECHO MEAS - AO ROOT DIAM: 3.3 CM
BH CV ECHO MEAS - AO V2 MAX: 112 CM/SEC
BH CV ECHO MEAS - AO V2 MAX: 114 CM/SEC
BH CV ECHO MEAS - AO V2 MAX: 90 CM/SEC
BH CV ECHO MEAS - AO V2 MEAN: 55.2 CM/SEC
BH CV ECHO MEAS - AO V2 MEAN: 67.1 CM/SEC
BH CV ECHO MEAS - AO V2 MEAN: 78.2 CM/SEC
BH CV ECHO MEAS - AO V2 VTI: 19.4 CM
BH CV ECHO MEAS - AO V2 VTI: 20.9 CM
BH CV ECHO MEAS - AO V2 VTI: 21.4 CM
BH CV ECHO MEAS - ASC AORTA: 2.6 CM
BH CV ECHO MEAS - AVA(I,A): 2.4 CM^2
BH CV ECHO MEAS - AVA(I,A): 2.8 CM^2
BH CV ECHO MEAS - AVA(I,A): 3 CM^2
BH CV ECHO MEAS - AVA(I,D): 2.4 CM^2
BH CV ECHO MEAS - AVA(I,D): 2.8 CM^2
BH CV ECHO MEAS - AVA(I,D): 3 CM^2
BH CV ECHO MEAS - BSA(HAYCOCK): 2.3 M^2
BH CV ECHO MEAS - BSA(HAYCOCK): 2.3 M^2
BH CV ECHO MEAS - BSA(HAYCOCK): 2.4 M^2
BH CV ECHO MEAS - BSA(HAYCOCK): 2.4 M^2
BH CV ECHO MEAS - BSA: 2.2 M^2
BH CV ECHO MEAS - BSA: 2.3 M^2
BH CV ECHO MEAS - BZI_BMI: 34.1 KILOGRAMS/M^2
BH CV ECHO MEAS - BZI_BMI: 35.1 KILOGRAMS/M^2
BH CV ECHO MEAS - BZI_BMI: 35.9 KILOGRAMS/M^2
BH CV ECHO MEAS - BZI_BMI: 36.9 KILOGRAMS/M^2
BH CV ECHO MEAS - BZI_METRIC_HEIGHT: 175.3 CM
BH CV ECHO MEAS - BZI_METRIC_WEIGHT: 104.8 KG
BH CV ECHO MEAS - BZI_METRIC_WEIGHT: 108 KG
BH CV ECHO MEAS - BZI_METRIC_WEIGHT: 110.2 KG
BH CV ECHO MEAS - BZI_METRIC_WEIGHT: 113.4 KG
BH CV ECHO MEAS - CONTRAST EF (2CH): 55.8 ML/M^2
BH CV ECHO MEAS - CONTRAST EF (2CH): 63.8 ML/M^2
BH CV ECHO MEAS - CONTRAST EF (2CH): 69.5 ML/M^2
BH CV ECHO MEAS - CONTRAST EF 4CH: 50.5 ML/M^2
BH CV ECHO MEAS - CONTRAST EF 4CH: 62.1 ML/M^2
BH CV ECHO MEAS - CONTRAST EF 4CH: 66.7 ML/M^2
BH CV ECHO MEAS - EDV(CUBED): 110.6 ML
BH CV ECHO MEAS - EDV(CUBED): 74.1 ML
BH CV ECHO MEAS - EDV(CUBED): 85.2 ML
BH CV ECHO MEAS - EDV(MOD-SP2): 131 ML
BH CV ECHO MEAS - EDV(MOD-SP2): 86 ML
BH CV ECHO MEAS - EDV(MOD-SP2): 94 ML
BH CV ECHO MEAS - EDV(MOD-SP4): 87 ML
BH CV ECHO MEAS - EDV(MOD-SP4): 99 ML
BH CV ECHO MEAS - EDV(MOD-SP4): 99 ML
BH CV ECHO MEAS - EDV(TEICH): 107.5 ML
BH CV ECHO MEAS - EDV(TEICH): 78.6 ML
BH CV ECHO MEAS - EDV(TEICH): 87.7 ML
BH CV ECHO MEAS - EF(CUBED): 73.4 %
BH CV ECHO MEAS - EF(CUBED): 79.4 %
BH CV ECHO MEAS - EF(CUBED): 85.9 %
BH CV ECHO MEAS - EF(MOD-SP2): 55.8 %
BH CV ECHO MEAS - EF(MOD-SP2): 63.8 %
BH CV ECHO MEAS - EF(MOD-SP2): 69.5 %
BH CV ECHO MEAS - EF(MOD-SP4): 50.5 %
BH CV ECHO MEAS - EF(MOD-SP4): 62.1 %
BH CV ECHO MEAS - EF(MOD-SP4): 66.7 %
BH CV ECHO MEAS - EF(TEICH): 65.6 %
BH CV ECHO MEAS - EF(TEICH): 71.9 %
BH CV ECHO MEAS - EF(TEICH): 79.2 %
BH CV ECHO MEAS - ESV(CUBED): 15.6 ML
BH CV ECHO MEAS - ESV(CUBED): 17.6 ML
BH CV ECHO MEAS - ESV(CUBED): 19.7 ML
BH CV ECHO MEAS - ESV(MOD-SP2): 34 ML
BH CV ECHO MEAS - ESV(MOD-SP2): 38 ML
BH CV ECHO MEAS - ESV(MOD-SP2): 40 ML
BH CV ECHO MEAS - ESV(MOD-SP4): 33 ML
BH CV ECHO MEAS - ESV(MOD-SP4): 33 ML
BH CV ECHO MEAS - ESV(MOD-SP4): 49 ML
BH CV ECHO MEAS - ESV(TEICH): 22.3 ML
BH CV ECHO MEAS - ESV(TEICH): 24.6 ML
BH CV ECHO MEAS - ESV(TEICH): 27 ML
BH CV ECHO MEAS - FS: 35.7 %
BH CV ECHO MEAS - FS: 40.9 %
BH CV ECHO MEAS - FS: 47.9 %
BH CV ECHO MEAS - IVS/LVPW: 1
BH CV ECHO MEAS - IVS/LVPW: 1.3
BH CV ECHO MEAS - IVS/LVPW: 1.5
BH CV ECHO MEAS - IVSD: 1 CM
BH CV ECHO MEAS - IVSD: 1.2 CM
BH CV ECHO MEAS - IVSD: 1.2 CM
BH CV ECHO MEAS - LA DIMENSION(2D): 15 CM
BH CV ECHO MEAS - LA DIMENSION: 5 CM
BH CV ECHO MEAS - LAT PEAK E' VEL: 5 CM/SEC
BH CV ECHO MEAS - LAT PEAK E' VEL: 7 CM/SEC
BH CV ECHO MEAS - LAT PEAK E' VEL: 8 CM/SEC
BH CV ECHO MEAS - LV DIASTOLIC VOL/BSA (35-75): 39.1 ML/M^2
BH CV ECHO MEAS - LV DIASTOLIC VOL/BSA (35-75): 43.6 ML/M^2
BH CV ECHO MEAS - LV DIASTOLIC VOL/BSA (35-75): 45.1 ML/M^2
BH CV ECHO MEAS - LV MASS(C)D: 137.2 GRAMS
BH CV ECHO MEAS - LV MASS(C)D: 147.8 GRAMS
BH CV ECHO MEAS - LV MASS(C)D: 181.9 GRAMS
BH CV ECHO MEAS - LV MASS(C)DI: 61.7 GRAMS/M^2
BH CV ECHO MEAS - LV MASS(C)DI: 67.3 GRAMS/M^2
BH CV ECHO MEAS - LV MASS(C)DI: 80.1 GRAMS/M^2
BH CV ECHO MEAS - LV MAX PG: 3 MMHG
BH CV ECHO MEAS - LV MEAN PG: 1 MMHG
BH CV ECHO MEAS - LV MEAN PG: 2 MMHG
BH CV ECHO MEAS - LV MEAN PG: 2 MMHG
BH CV ECHO MEAS - LV SYSTOLIC VOL/BSA (12-30): 14.5 ML/M^2
BH CV ECHO MEAS - LV SYSTOLIC VOL/BSA (12-30): 14.8 ML/M^2
BH CV ECHO MEAS - LV SYSTOLIC VOL/BSA (12-30): 22.3 ML/M^2
BH CV ECHO MEAS - LV V1 MAX: 92 CM/SEC
BH CV ECHO MEAS - LV V1 MAX: 93 CM/SEC
BH CV ECHO MEAS - LV V1 MEAN: 55.7 CM/SEC
BH CV ECHO MEAS - LV V1 MEAN: 56.1 CM/SEC
BH CV ECHO MEAS - LV V1 MEAN: 63 CM/SEC
BH CV ECHO MEAS - LV V1 VTI: 16.9 CM
BH CV ECHO MEAS - LV V1 VTI: 17.8 CM
BH CV ECHO MEAS - LV V1 VTI: 18.6 CM
BH CV ECHO MEAS - LVIDD: 4.2 CM
BH CV ECHO MEAS - LVIDD: 4.4 CM
BH CV ECHO MEAS - LVIDD: 4.8 CM
BH CV ECHO MEAS - LVIDS: 2.5 CM
BH CV ECHO MEAS - LVIDS: 2.6 CM
BH CV ECHO MEAS - LVIDS: 2.7 CM
BH CV ECHO MEAS - LVLD AP2: 7.6 CM
BH CV ECHO MEAS - LVLD AP2: 7.8 CM
BH CV ECHO MEAS - LVLD AP2: 8.4 CM
BH CV ECHO MEAS - LVLD AP4: 7.3 CM
BH CV ECHO MEAS - LVLD AP4: 7.6 CM
BH CV ECHO MEAS - LVLD AP4: 7.8 CM
BH CV ECHO MEAS - LVLS AP2: 6.3 CM
BH CV ECHO MEAS - LVLS AP2: 6.4 CM
BH CV ECHO MEAS - LVLS AP2: 6.6 CM
BH CV ECHO MEAS - LVLS AP4: 6.4 CM
BH CV ECHO MEAS - LVLS AP4: 6.4 CM
BH CV ECHO MEAS - LVLS AP4: 7.1 CM
BH CV ECHO MEAS - LVOT AREA (M): 2.8 CM^2
BH CV ECHO MEAS - LVOT AREA (M): 3.1 CM^2
BH CV ECHO MEAS - LVOT AREA (M): 3.5 CM^2
BH CV ECHO MEAS - LVOT AREA: 2.8 CM^2
BH CV ECHO MEAS - LVOT AREA: 3.1 CM^2
BH CV ECHO MEAS - LVOT AREA: 3.5 CM^2
BH CV ECHO MEAS - LVOT DIAM: 1.9 CM
BH CV ECHO MEAS - LVOT DIAM: 2 CM
BH CV ECHO MEAS - LVOT DIAM: 2.1 CM
BH CV ECHO MEAS - LVPWD: 0.8 CM
BH CV ECHO MEAS - LVPWD: 0.9 CM
BH CV ECHO MEAS - LVPWD: 1 CM
BH CV ECHO MEAS - MED PEAK E' VEL: 4 CM/SEC
BH CV ECHO MEAS - MED PEAK E' VEL: 4 CM/SEC
BH CV ECHO MEAS - MV A DUR: 0.07 SEC
BH CV ECHO MEAS - MV A DUR: 0.11 SEC
BH CV ECHO MEAS - MV A MAX VEL: 37.5 CM/SEC
BH CV ECHO MEAS - MV A MAX VEL: 43.6 CM/SEC
BH CV ECHO MEAS - MV DEC SLOPE: 441 CM/SEC^2
BH CV ECHO MEAS - MV DEC SLOPE: 444 CM/SEC^2
BH CV ECHO MEAS - MV DEC SLOPE: 557 CM/SEC^2
BH CV ECHO MEAS - MV DEC TIME: 0.15 SEC
BH CV ECHO MEAS - MV DEC TIME: 0.17 SEC
BH CV ECHO MEAS - MV DEC TIME: 0.18 SEC
BH CV ECHO MEAS - MV E MAX VEL: 104 CM/SEC
BH CV ECHO MEAS - MV E MAX VEL: 115 CM/SEC
BH CV ECHO MEAS - MV E MAX VEL: 121 CM/SEC
BH CV ECHO MEAS - MV E/A: 2.4
BH CV ECHO MEAS - MV E/A: 3.2
BH CV ECHO MEAS - MV MAX PG: 8 MMHG
BH CV ECHO MEAS - MV MEAN PG: 2 MMHG
BH CV ECHO MEAS - MV P1/2T MAX VEL: 114 CM/SEC
BH CV ECHO MEAS - MV P1/2T MAX VEL: 122 CM/SEC
BH CV ECHO MEAS - MV P1/2T MAX VEL: 147 CM/SEC
BH CV ECHO MEAS - MV P1/2T: 75.7 MSEC
BH CV ECHO MEAS - MV P1/2T: 77.3 MSEC
BH CV ECHO MEAS - MV P1/2T: 80.5 MSEC
BH CV ECHO MEAS - MV V2 MEAN: 54.5 CM/SEC
BH CV ECHO MEAS - MV V2 MEAN: 63.3 CM/SEC
BH CV ECHO MEAS - MV V2 MEAN: 63.3 CM/SEC
BH CV ECHO MEAS - MV V2 VTI: 33.4 CM
BH CV ECHO MEAS - MV V2 VTI: 36.1 CM
BH CV ECHO MEAS - MV V2 VTI: 37.4 CM
BH CV ECHO MEAS - MVA P1/2T LCG: 1.5 CM^2
BH CV ECHO MEAS - MVA P1/2T LCG: 1.8 CM^2
BH CV ECHO MEAS - MVA P1/2T LCG: 1.9 CM^2
BH CV ECHO MEAS - MVA(P1/2T): 2.7 CM^2
BH CV ECHO MEAS - MVA(P1/2T): 2.8 CM^2
BH CV ECHO MEAS - MVA(P1/2T): 2.9 CM^2
BH CV ECHO MEAS - MVA(VTI): 1.5 CM^2
BH CV ECHO MEAS - MVA(VTI): 1.6 CM^2
BH CV ECHO MEAS - MVA(VTI): 1.6 CM^2
BH CV ECHO MEAS - PA ACC SLOPE: 120 CM/SEC^2
BH CV ECHO MEAS - PA ACC SLOPE: 18.4 CM/SEC^2
BH CV ECHO MEAS - PA ACC SLOPE: 20.6 CM/SEC^2
BH CV ECHO MEAS - PA ACC TIME: 0.06 SEC
BH CV ECHO MEAS - PA ACC TIME: 0.07 SEC
BH CV ECHO MEAS - PA ACC TIME: 0.11 SEC
BH CV ECHO MEAS - PA MAX PG (FULL): 1.6 MMHG
BH CV ECHO MEAS - PA MAX PG (FULL): 2.6 MMHG
BH CV ECHO MEAS - PA MAX PG: 3.2 MMHG
BH CV ECHO MEAS - PA MAX PG: 4 MMHG
BH CV ECHO MEAS - PA MAX PG: 4.8 MMHG
BH CV ECHO MEAS - PA PR(ACCEL): 31.3 MMHG
BH CV ECHO MEAS - PA PR(ACCEL): 45.7 MMHG
BH CV ECHO MEAS - PA PR(ACCEL): 53.8 MMHG
BH CV ECHO MEAS - PA V2 MAX: 100 CM/SEC
BH CV ECHO MEAS - PA V2 MAX: 110 CM/SEC
BH CV ECHO MEAS - PA V2 MAX: 89 CM/SEC
BH CV ECHO MEAS - PULM A REVS DUR: 0.09 SEC
BH CV ECHO MEAS - PULM A REVS DUR: 0.09 SEC
BH CV ECHO MEAS - PULM A REVS VEL: 22.4 CM/SEC
BH CV ECHO MEAS - PULM A REVS VEL: 26.8 CM/SEC
BH CV ECHO MEAS - PULM DIAS VEL: 101 CM/SEC
BH CV ECHO MEAS - PULM DIAS VEL: 50.1 CM/SEC
BH CV ECHO MEAS - PULM S/D: 0.41
BH CV ECHO MEAS - PULM S/D: 0.48
BH CV ECHO MEAS - PULM SYS VEL: 23.9 CM/SEC
BH CV ECHO MEAS - PULM SYS VEL: 41.1 CM/SEC
BH CV ECHO MEAS - PVA(V,A): 0.9 CM^2
BH CV ECHO MEAS - PVA(V,A): 3.5 CM^2
BH CV ECHO MEAS - PVA(V,D): 0.9 CM^2
BH CV ECHO MEAS - PVA(V,D): 3.5 CM^2
BH CV ECHO MEAS - QP/QS: 0.35
BH CV ECHO MEAS - QP/QS: 1.1
BH CV ECHO MEAS - QP/QS: 1.2
BH CV ECHO MEAS - RAP SYSTOLE: 15 MMHG
BH CV ECHO MEAS - RAP SYSTOLE: 8 MMHG
BH CV ECHO MEAS - RAP SYSTOLE: 8 MMHG
BH CV ECHO MEAS - RV MAX PG: 2.2 MMHG
BH CV ECHO MEAS - RV MAX PG: 2.4 MMHG
BH CV ECHO MEAS - RV MEAN PG: 1 MMHG
BH CV ECHO MEAS - RV V1 MAX: 74.4 CM/SEC
BH CV ECHO MEAS - RV V1 MAX: 77.5 CM/SEC
BH CV ECHO MEAS - RV V1 MEAN: 45.1 CM/SEC
BH CV ECHO MEAS - RV V1 MEAN: 45.3 CM/SEC
BH CV ECHO MEAS - RV V1 MEAN: 47.5 CM/SEC
BH CV ECHO MEAS - RV V1 VTI: 12.9 CM
BH CV ECHO MEAS - RV V1 VTI: 13.4 CM
BH CV ECHO MEAS - RV V1 VTI: 15.9 CM
BH CV ECHO MEAS - RVOT AREA: 1.3 CM^2
BH CV ECHO MEAS - RVOT AREA: 4.5 CM^2
BH CV ECHO MEAS - RVOT AREA: 4.9 CM^2
BH CV ECHO MEAS - RVOT DIAM: 1.3 CM
BH CV ECHO MEAS - RVOT DIAM: 2.4 CM
BH CV ECHO MEAS - RVOT DIAM: 2.5 CM
BH CV ECHO MEAS - RVSP: 27 MMHG
BH CV ECHO MEAS - RVSP: 44 MMHG
BH CV ECHO MEAS - RVSP: 45.9 MMHG
BH CV ECHO MEAS - SI(AO): 66.7 ML/M^2
BH CV ECHO MEAS - SI(AO): 71.1 ML/M^2
BH CV ECHO MEAS - SI(AO): 80.4 ML/M^2
BH CV ECHO MEAS - SI(CUBED): 24.5 ML/M^2
BH CV ECHO MEAS - SI(CUBED): 30.8 ML/M^2
BH CV ECHO MEAS - SI(CUBED): 41.8 ML/M^2
BH CV ECHO MEAS - SI(LVOT): 22.2 ML/M^2
BH CV ECHO MEAS - SI(LVOT): 26.3 ML/M^2
BH CV ECHO MEAS - SI(LVOT): 26.6 ML/M^2
BH CV ECHO MEAS - SI(MOD-SP2): 21.9 ML/M^2
BH CV ECHO MEAS - SI(MOD-SP2): 27 ML/M^2
BH CV ECHO MEAS - SI(MOD-SP2): 40.1 ML/M^2
BH CV ECHO MEAS - SI(MOD-SP4): 22.8 ML/M^2
BH CV ECHO MEAS - SI(MOD-SP4): 24.3 ML/M^2
BH CV ECHO MEAS - SI(MOD-SP4): 29.1 ML/M^2
BH CV ECHO MEAS - SI(TEICH): 23.2 ML/M^2
BH CV ECHO MEAS - SI(TEICH): 28.7 ML/M^2
BH CV ECHO MEAS - SI(TEICH): 37.5 ML/M^2
BH CV ECHO MEAS - SUP REN AO DIAM: 2.4 CM
BH CV ECHO MEAS - SV(AO): 146.4 ML
BH CV ECHO MEAS - SV(AO): 161.5 ML
BH CV ECHO MEAS - SV(AO): 178.8 ML
BH CV ECHO MEAS - SV(CUBED): 54.4 ML
BH CV ECHO MEAS - SV(CUBED): 67.6 ML
BH CV ECHO MEAS - SV(CUBED): 95 ML
BH CV ECHO MEAS - SV(LVOT): 50.5 ML
BH CV ECHO MEAS - SV(LVOT): 58.4 ML
BH CV ECHO MEAS - SV(LVOT): 58.5 ML
BH CV ECHO MEAS - SV(MOD-SP2): 48 ML
BH CV ECHO MEAS - SV(MOD-SP2): 60 ML
BH CV ECHO MEAS - SV(MOD-SP2): 91 ML
BH CV ECHO MEAS - SV(MOD-SP4): 50 ML
BH CV ECHO MEAS - SV(MOD-SP4): 54 ML
BH CV ECHO MEAS - SV(MOD-SP4): 66 ML
BH CV ECHO MEAS - SV(RVOT): 17.8 ML
BH CV ECHO MEAS - SV(RVOT): 63.3 ML
BH CV ECHO MEAS - SV(RVOT): 71.9 ML
BH CV ECHO MEAS - SV(TEICH): 51.6 ML
BH CV ECHO MEAS - SV(TEICH): 63.1 ML
BH CV ECHO MEAS - SV(TEICH): 85.2 ML
BH CV ECHO MEAS - TAPSE (>1.6): 1.7 CM2
BH CV ECHO MEAS - TAPSE (>1.6): 2.1 CM2
BH CV ECHO MEAS - TR MAX VEL: 217 CM/SEC
BH CV ECHO MEAS - TR MAX VEL: 278 CM/SEC
BH CV ECHO MEAS - TR MAX VEL: 299 CM/SEC
BH CV STRESS COMMENTS STAGE 1: NORMAL
BH CV STRESS DOSE REGADENOSON STAGE 1: 0.4
BH CV STRESS DURATION MIN STAGE 1: 0
BH CV STRESS DURATION SEC STAGE 1: 15
BH CV STRESS PROTOCOL 1: NORMAL
BH CV STRESS RECOVERY BP: NORMAL MMHG
BH CV STRESS RECOVERY HR: 75 BPM
BH CV STRESS STAGE 1: 1
BH CV UPPER VENOUS LEFT INTERNAL JUGULAR AUGMENT: NORMAL
BH CV UPPER VENOUS LEFT INTERNAL JUGULAR COMPETENT: NORMAL
BH CV UPPER VENOUS LEFT INTERNAL JUGULAR COMPRESS: NORMAL
BH CV UPPER VENOUS LEFT INTERNAL JUGULAR PHASIC: NORMAL
BH CV UPPER VENOUS LEFT INTERNAL JUGULAR SPONT: NORMAL
BH CV UPPER VENOUS LEFT SUBCLAVIAN AUGMENT: NORMAL
BH CV UPPER VENOUS LEFT SUBCLAVIAN COMPETENT: NORMAL
BH CV UPPER VENOUS LEFT SUBCLAVIAN COMPRESS: NORMAL
BH CV UPPER VENOUS LEFT SUBCLAVIAN PHASIC: NORMAL
BH CV UPPER VENOUS LEFT SUBCLAVIAN SPONT: NORMAL
BH CV UPPER VENOUS RIGHT AXILLARY AUGMENT: NORMAL
BH CV UPPER VENOUS RIGHT AXILLARY COMPETENT: NORMAL
BH CV UPPER VENOUS RIGHT AXILLARY COMPRESS: NORMAL
BH CV UPPER VENOUS RIGHT AXILLARY PHASIC: NORMAL
BH CV UPPER VENOUS RIGHT AXILLARY SPONT: NORMAL
BH CV UPPER VENOUS RIGHT BASILIC FOREARM COMPRESS: NORMAL
BH CV UPPER VENOUS RIGHT BASILIC UPPER COMPRESS: NORMAL
BH CV UPPER VENOUS RIGHT BRACHIAL COMPRESS: NORMAL
BH CV UPPER VENOUS RIGHT CEPHALIC FOREARM COMPRESS: NORMAL
BH CV UPPER VENOUS RIGHT CEPHALIC UPPER COMPRESS: NORMAL
BH CV UPPER VENOUS RIGHT INTERNAL JUGULAR AUGMENT: NORMAL
BH CV UPPER VENOUS RIGHT INTERNAL JUGULAR COMPETENT: NORMAL
BH CV UPPER VENOUS RIGHT INTERNAL JUGULAR COMPRESS: NORMAL
BH CV UPPER VENOUS RIGHT INTERNAL JUGULAR PHASIC: NORMAL
BH CV UPPER VENOUS RIGHT INTERNAL JUGULAR SPONT: NORMAL
BH CV UPPER VENOUS RIGHT RADIAL COMPRESS: NORMAL
BH CV UPPER VENOUS RIGHT SUBCLAVIAN AUGMENT: NORMAL
BH CV UPPER VENOUS RIGHT SUBCLAVIAN COMPETENT: NORMAL
BH CV UPPER VENOUS RIGHT SUBCLAVIAN COMPRESS: NORMAL
BH CV UPPER VENOUS RIGHT SUBCLAVIAN PHASIC: NORMAL
BH CV UPPER VENOUS RIGHT SUBCLAVIAN SPONT: NORMAL
BH CV UPPER VENOUS RIGHT ULNAR COMPRESS: NORMAL
BH CV VAS BP RIGHT ARM: NORMAL MMHG
BH CV XLRA - RV BASE: 3 CM
BH CV XLRA - RV BASE: 3.9 CM
BH CV XLRA - RV BASE: 4.5 CM
BH CV XLRA - TDI S': 12 CM/SEC
BH CV XLRA - TDI S': 13 CM/SEC
BH CV XLRA - TDI S': 13 CM/SEC
BILIRUB SERPL-MCNC: 0.3 MG/DL (ref 0.1–1.2)
BILIRUB SERPL-MCNC: 0.4 MG/DL (ref 0.1–1.2)
BILIRUB SERPL-MCNC: 0.4 MG/DL (ref 0.1–1.2)
BILIRUB SERPL-MCNC: 0.5 MG/DL (ref 0.1–1.2)
BILIRUB SERPL-MCNC: 0.6 MG/DL (ref 0.1–1.2)
BILIRUB SERPL-MCNC: 0.6 MG/DL (ref 0.1–1.2)
BILIRUB SERPL-MCNC: 0.7 MG/DL (ref 0.1–1.2)
BILIRUB SERPL-MCNC: 0.8 MG/DL (ref 0.1–1.2)
BUN BLD-MCNC: 11 MG/DL (ref 8–23)
BUN BLD-MCNC: 12 MG/DL (ref 8–23)
BUN BLD-MCNC: 12 MG/DL (ref 8–23)
BUN BLD-MCNC: 13 MG/DL (ref 8–23)
BUN BLD-MCNC: 14 MG/DL (ref 8–23)
BUN BLD-MCNC: 15 MG/DL (ref 8–23)
BUN BLD-MCNC: 15 MG/DL (ref 8–23)
BUN BLD-MCNC: 16 MG/DL (ref 8–23)
BUN BLD-MCNC: 17 MG/DL (ref 8–23)
BUN BLD-MCNC: 17 MG/DL (ref 8–23)
BUN BLD-MCNC: 18 MG/DL (ref 8–23)
BUN BLD-MCNC: 19 MG/DL (ref 8–23)
BUN BLD-MCNC: 20 MG/DL (ref 8–23)
BUN BLD-MCNC: 21 MG/DL (ref 8–23)
BUN BLD-MCNC: 22 MG/DL (ref 8–23)
BUN BLD-MCNC: 23 MG/DL (ref 8–23)
BUN/CREAT SERPL: 10 (ref 7–25)
BUN/CREAT SERPL: 10.6 (ref 7–25)
BUN/CREAT SERPL: 11.6 (ref 7–25)
BUN/CREAT SERPL: 12.5 (ref 7–25)
BUN/CREAT SERPL: 13.1 (ref 7–25)
BUN/CREAT SERPL: 13.3 (ref 7–25)
BUN/CREAT SERPL: 13.3 (ref 7–25)
BUN/CREAT SERPL: 13.5 (ref 7–25)
BUN/CREAT SERPL: 13.6 (ref 7–25)
BUN/CREAT SERPL: 13.8 (ref 7–25)
BUN/CREAT SERPL: 14.3 (ref 7–25)
BUN/CREAT SERPL: 14.5 (ref 7–25)
BUN/CREAT SERPL: 14.7 (ref 7–25)
BUN/CREAT SERPL: 14.8 (ref 7–25)
BUN/CREAT SERPL: 15.2 (ref 7–25)
BUN/CREAT SERPL: 15.3 (ref 7–25)
BUN/CREAT SERPL: 15.3 (ref 7–25)
BUN/CREAT SERPL: 15.4 (ref 7–25)
BUN/CREAT SERPL: 16.1 (ref 7–25)
BUN/CREAT SERPL: 16.5 (ref 7–25)
BUN/CREAT SERPL: 18.1 (ref 7–25)
BUN/CREAT SERPL: 18.8 (ref 7–25)
BUN/CREAT SERPL: 19.1 (ref 7–25)
BUN/CREAT SERPL: 19.2 (ref 7–25)
BUN/CREAT SERPL: 23 (ref 7–25)
BUN/CREAT SERPL: 9.6 (ref 7–25)
C PNEUM DNA NPH QL NAA+NON-PROBE: NOT DETECTED
CALCIUM SPEC-SCNC: 10 MG/DL (ref 8.6–10.5)
CALCIUM SPEC-SCNC: 10.2 MG/DL (ref 8.6–10.5)
CALCIUM SPEC-SCNC: 10.2 MG/DL (ref 8.6–10.5)
CALCIUM SPEC-SCNC: 9 MG/DL (ref 8.6–10.5)
CALCIUM SPEC-SCNC: 9.1 MG/DL (ref 8.6–10.5)
CALCIUM SPEC-SCNC: 9.1 MG/DL (ref 8.6–10.5)
CALCIUM SPEC-SCNC: 9.2 MG/DL (ref 8.6–10.5)
CALCIUM SPEC-SCNC: 9.3 MG/DL (ref 8.6–10.5)
CALCIUM SPEC-SCNC: 9.4 MG/DL (ref 8.6–10.5)
CALCIUM SPEC-SCNC: 9.5 MG/DL (ref 8.6–10.5)
CALCIUM SPEC-SCNC: 9.6 MG/DL (ref 8.6–10.5)
CALCIUM SPEC-SCNC: 9.8 MG/DL (ref 8.6–10.5)
CALCIUM SPEC-SCNC: 9.9 MG/DL (ref 8.6–10.5)
CHLORIDE SERPL-SCNC: 100 MMOL/L (ref 98–107)
CHLORIDE SERPL-SCNC: 101 MMOL/L (ref 98–107)
CHLORIDE SERPL-SCNC: 102 MMOL/L (ref 98–107)
CHLORIDE SERPL-SCNC: 103 MMOL/L (ref 98–107)
CHLORIDE SERPL-SCNC: 104 MMOL/L (ref 98–107)
CHLORIDE SERPL-SCNC: 106 MMOL/L (ref 98–107)
CHLORIDE SERPL-SCNC: 99 MMOL/L (ref 98–107)
CHOLEST SERPL-MCNC: 169 MG/DL (ref 0–200)
CHOLEST SERPL-MCNC: 170 MG/DL (ref 0–200)
CHOLEST SERPL-MCNC: 178 MG/DL (ref 0–200)
CHOLEST SERPL-MCNC: 182 MG/DL (ref 0–200)
CHOLEST SERPL-MCNC: 189 MG/DL (ref 0–200)
CHOLEST SERPL-MCNC: 201 MG/DL (ref 0–200)
CK MB SERPL-CCNC: 1.06 NG/ML
CK MB SERPL-CCNC: 1.07 NG/ML
CK MB SERPL-CCNC: 1.12 NG/ML
CK MB SERPL-CCNC: 1.18 NG/ML
CK MB SERPL-CCNC: 1.48 NG/ML
CK SERPL-CCNC: 19 U/L (ref 20–200)
CK SERPL-CCNC: 28 U/L (ref 20–200)
CK SERPL-CCNC: 45 U/L (ref 20–200)
CK SERPL-CCNC: 47 U/L (ref 20–200)
CK SERPL-CCNC: 52 U/L (ref 20–200)
CO2 SERPL-SCNC: 23 MMOL/L (ref 22–29)
CO2 SERPL-SCNC: 23.4 MMOL/L (ref 22–29)
CO2 SERPL-SCNC: 23.5 MMOL/L (ref 22–29)
CO2 SERPL-SCNC: 23.7 MMOL/L (ref 22–29)
CO2 SERPL-SCNC: 23.8 MMOL/L (ref 22–29)
CO2 SERPL-SCNC: 24.2 MMOL/L (ref 22–29)
CO2 SERPL-SCNC: 24.4 MMOL/L (ref 22–29)
CO2 SERPL-SCNC: 24.5 MMOL/L (ref 22–29)
CO2 SERPL-SCNC: 24.8 MMOL/L (ref 22–29)
CO2 SERPL-SCNC: 25.2 MMOL/L (ref 22–29)
CO2 SERPL-SCNC: 25.2 MMOL/L (ref 22–29)
CO2 SERPL-SCNC: 25.4 MMOL/L (ref 22–29)
CO2 SERPL-SCNC: 25.5 MMOL/L (ref 22–29)
CO2 SERPL-SCNC: 25.7 MMOL/L (ref 22–29)
CO2 SERPL-SCNC: 25.8 MMOL/L (ref 22–29)
CO2 SERPL-SCNC: 25.8 MMOL/L (ref 22–29)
CO2 SERPL-SCNC: 26.1 MMOL/L (ref 22–29)
CO2 SERPL-SCNC: 26.2 MMOL/L (ref 22–29)
CO2 SERPL-SCNC: 26.4 MMOL/L (ref 22–29)
CO2 SERPL-SCNC: 26.6 MMOL/L (ref 22–29)
CO2 SERPL-SCNC: 26.7 MMOL/L (ref 22–29)
CO2 SERPL-SCNC: 26.7 MMOL/L (ref 22–29)
CO2 SERPL-SCNC: 27.1 MMOL/L (ref 22–29)
CO2 SERPL-SCNC: 27.4 MMOL/L (ref 22–29)
CO2 SERPL-SCNC: 27.7 MMOL/L (ref 22–29)
CO2 SERPL-SCNC: 27.8 MMOL/L (ref 22–29)
CREAT BLD-MCNC: 0.87 MG/DL (ref 0.76–1.27)
CREAT BLD-MCNC: 0.92 MG/DL (ref 0.76–1.27)
CREAT BLD-MCNC: 0.96 MG/DL (ref 0.76–1.27)
CREAT BLD-MCNC: 0.98 MG/DL (ref 0.76–1.27)
CREAT BLD-MCNC: 0.99 MG/DL (ref 0.76–1.27)
CREAT BLD-MCNC: 1.04 MG/DL (ref 0.76–1.27)
CREAT BLD-MCNC: 1.05 MG/DL (ref 0.76–1.27)
CREAT BLD-MCNC: 1.05 MG/DL (ref 0.76–1.27)
CREAT BLD-MCNC: 1.07 MG/DL (ref 0.76–1.27)
CREAT BLD-MCNC: 1.09 MG/DL (ref 0.76–1.27)
CREAT BLD-MCNC: 1.09 MG/DL (ref 0.76–1.27)
CREAT BLD-MCNC: 1.11 MG/DL (ref 0.76–1.27)
CREAT BLD-MCNC: 1.12 MG/DL (ref 0.76–1.27)
CREAT BLD-MCNC: 1.13 MG/DL (ref 0.76–1.27)
CREAT BLD-MCNC: 1.14 MG/DL (ref 0.76–1.27)
CREAT BLD-MCNC: 1.15 MG/DL (ref 0.76–1.27)
CREAT BLD-MCNC: 1.16 MG/DL (ref 0.76–1.27)
CREAT BLD-MCNC: 1.18 MG/DL (ref 0.76–1.27)
CREAT BLD-MCNC: 1.18 MG/DL (ref 0.76–1.27)
CREAT BLD-MCNC: 1.2 MG/DL (ref 0.76–1.27)
CREAT BLD-MCNC: 1.21 MG/DL (ref 0.76–1.27)
CREAT BLD-MCNC: 1.24 MG/DL (ref 0.76–1.27)
CREAT BLD-MCNC: 1.28 MG/DL (ref 0.76–1.27)
CREAT BLD-MCNC: 1.43 MG/DL (ref 0.76–1.27)
CREAT BLD-MCNC: 1.49 MG/DL (ref 0.76–1.27)
D DIMER PPP FEU-MCNC: 0.29 MCGFEU/ML (ref 0–0.49)
D DIMER PPP FEU-MCNC: 0.35 MCGFEU/ML (ref 0–0.49)
D DIMER PPP FEU-MCNC: <0.27 MCGFEU/ML (ref 0–0.49)
DEPRECATED RDW RBC AUTO: 46.9 FL (ref 37–54)
DEPRECATED RDW RBC AUTO: 46.9 FL (ref 37–54)
DEPRECATED RDW RBC AUTO: 47.1 FL (ref 37–54)
DEPRECATED RDW RBC AUTO: 47.3 FL (ref 37–54)
DEPRECATED RDW RBC AUTO: 47.5 FL (ref 37–54)
DEPRECATED RDW RBC AUTO: 47.8 FL (ref 37–54)
DEPRECATED RDW RBC AUTO: 48.2 FL (ref 37–54)
DEPRECATED RDW RBC AUTO: 48.4 FL (ref 37–54)
DEPRECATED RDW RBC AUTO: 48.8 FL (ref 37–54)
DEPRECATED RDW RBC AUTO: 49.1 FL (ref 37–54)
DEPRECATED RDW RBC AUTO: 49.1 FL (ref 37–54)
DEPRECATED RDW RBC AUTO: 49.4 FL (ref 37–54)
DEPRECATED RDW RBC AUTO: 49.7 FL (ref 37–54)
DEPRECATED RDW RBC AUTO: 50 FL (ref 37–54)
DEPRECATED RDW RBC AUTO: 51.6 FL (ref 37–54)
DEPRECATED RDW RBC AUTO: 51.7 FL (ref 37–54)
DEPRECATED RDW RBC AUTO: 52 FL (ref 37–54)
DEPRECATED RDW RBC AUTO: 53.1 FL (ref 37–54)
DEPRECATED RDW RBC AUTO: 53.8 FL (ref 37–54)
DEPRECATED RDW RBC AUTO: 54.1 FL (ref 37–54)
DEPRECATED RDW RBC AUTO: 54.2 FL (ref 37–54)
DEPRECATED RDW RBC AUTO: 54.3 FL (ref 37–54)
DEPRECATED RDW RBC AUTO: 54.3 FL (ref 37–54)
E/E' RATIO: 24
E/E' RATIO: 24
ENA SCL70 AB SER-ACNC: <0.2 AI (ref 0–0.9)
ENA SS-A AB SER-ACNC: <0.2 AI (ref 0–0.9)
ENA SS-B AB SER-ACNC: <0.2 AI (ref 0–0.9)
EOSINOPHIL # BLD AUTO: 0.03 10*3/MM3 (ref 0–0.7)
EOSINOPHIL # BLD AUTO: 0.05 10*3/MM3 (ref 0–0.7)
EOSINOPHIL # BLD AUTO: 0.06 10*3/MM3 (ref 0–0.7)
EOSINOPHIL # BLD AUTO: 0.09 10*3/MM3 (ref 0–0.7)
EOSINOPHIL # BLD AUTO: 0.1 10*3/MM3 (ref 0–0.7)
EOSINOPHIL # BLD AUTO: 0.1 10*3/MM3 (ref 0–0.7)
EOSINOPHIL # BLD AUTO: 0.12 10*3/MM3 (ref 0–0.7)
EOSINOPHIL # BLD AUTO: 0.15 10*3/MM3 (ref 0–0.7)
EOSINOPHIL # BLD AUTO: 0.24 10*3/MM3 (ref 0–0.7)
EOSINOPHIL # BLD AUTO: 0.31 10*3/MM3 (ref 0–0.7)
EOSINOPHIL NFR BLD AUTO: 0.3 % (ref 0.3–6.2)
EOSINOPHIL NFR BLD AUTO: 0.4 % (ref 0.3–6.2)
EOSINOPHIL NFR BLD AUTO: 0.4 % (ref 0.3–6.2)
EOSINOPHIL NFR BLD AUTO: 0.5 % (ref 0.3–6.2)
EOSINOPHIL NFR BLD AUTO: 0.6 % (ref 0.3–6.2)
EOSINOPHIL NFR BLD AUTO: 0.6 % (ref 0.3–6.2)
EOSINOPHIL NFR BLD AUTO: 0.9 % (ref 0.3–6.2)
EOSINOPHIL NFR BLD AUTO: 1 % (ref 0.3–6.2)
EOSINOPHIL NFR BLD AUTO: 1.1 % (ref 0.3–6.2)
EOSINOPHIL NFR BLD AUTO: 1.1 % (ref 0.3–6.2)
EOSINOPHIL NFR BLD AUTO: 1.2 % (ref 0.3–6.2)
EOSINOPHIL NFR BLD AUTO: 3 % (ref 0.3–6.2)
EOSINOPHIL NFR BLD AUTO: 3.1 % (ref 0.3–6.2)
ERYTHROCYTE [DISTWIDTH] IN BLOOD BY AUTOMATED COUNT: 14.2 % (ref 11.5–14.5)
ERYTHROCYTE [DISTWIDTH] IN BLOOD BY AUTOMATED COUNT: 14.3 % (ref 11.5–14.5)
ERYTHROCYTE [DISTWIDTH] IN BLOOD BY AUTOMATED COUNT: 14.5 % (ref 11.5–14.5)
ERYTHROCYTE [DISTWIDTH] IN BLOOD BY AUTOMATED COUNT: 14.8 % (ref 11.5–14.5)
ERYTHROCYTE [DISTWIDTH] IN BLOOD BY AUTOMATED COUNT: 14.8 % (ref 11.5–14.5)
ERYTHROCYTE [DISTWIDTH] IN BLOOD BY AUTOMATED COUNT: 14.9 % (ref 11.5–14.5)
ERYTHROCYTE [DISTWIDTH] IN BLOOD BY AUTOMATED COUNT: 15 % (ref 11.5–14.5)
ERYTHROCYTE [DISTWIDTH] IN BLOOD BY AUTOMATED COUNT: 15.1 % (ref 11.5–14.5)
ERYTHROCYTE [DISTWIDTH] IN BLOOD BY AUTOMATED COUNT: 15.1 % (ref 4.5–15)
ERYTHROCYTE [DISTWIDTH] IN BLOOD BY AUTOMATED COUNT: 15.2 % (ref 11.5–14.5)
ERYTHROCYTE [DISTWIDTH] IN BLOOD BY AUTOMATED COUNT: 15.3 % (ref 11.5–14.5)
ERYTHROCYTE [DISTWIDTH] IN BLOOD BY AUTOMATED COUNT: 15.3 % (ref 11.5–14.5)
ERYTHROCYTE [DISTWIDTH] IN BLOOD BY AUTOMATED COUNT: 15.4 % (ref 11.5–14.5)
ERYTHROCYTE [DISTWIDTH] IN BLOOD BY AUTOMATED COUNT: 15.5 % (ref 11.5–14.5)
ERYTHROCYTE [DISTWIDTH] IN BLOOD BY AUTOMATED COUNT: 15.5 % (ref 11.5–14.5)
ERYTHROCYTE [DISTWIDTH] IN BLOOD BY AUTOMATED COUNT: 15.7 % (ref 11.5–14.5)
ERYTHROCYTE [DISTWIDTH] IN BLOOD BY AUTOMATED COUNT: 15.7 % (ref 11.5–14.5)
ERYTHROCYTE [DISTWIDTH] IN BLOOD BY AUTOMATED COUNT: 15.9 % (ref 4.5–15)
ERYTHROCYTE [SEDIMENTATION RATE] IN BLOOD: 31 MM/HR (ref 0–20)
ERYTHROCYTE [SEDIMENTATION RATE] IN BLOOD: 32 MM/HR (ref 0–20)
FERRITIN SERPL-MCNC: 90 NG/ML (ref 30–400)
FLUAV H1 2009 PAND RNA NPH QL NAA+PROBE: NOT DETECTED
FLUAV H1 HA GENE NPH QL NAA+PROBE: NOT DETECTED
FLUAV H3 RNA NPH QL NAA+PROBE: NOT DETECTED
FLUAV SUBTYP SPEC NAA+PROBE: NOT DETECTED
FLUBV RNA ISLT QL NAA+PROBE: NOT DETECTED
FOLATE SERPL-MCNC: 6.64 NG/ML (ref 4.78–24.2)
GFR SERPL CREATININE-BSD FRML MDRD: 47 ML/MIN/1.73
GFR SERPL CREATININE-BSD FRML MDRD: 49 ML/MIN/1.73
GFR SERPL CREATININE-BSD FRML MDRD: 56 ML/MIN/1.73
GFR SERPL CREATININE-BSD FRML MDRD: 58 ML/MIN/1.73
GFR SERPL CREATININE-BSD FRML MDRD: 59 ML/MIN/1.73
GFR SERPL CREATININE-BSD FRML MDRD: 60 ML/MIN/1.73
GFR SERPL CREATININE-BSD FRML MDRD: 61 ML/MIN/1.73
GFR SERPL CREATININE-BSD FRML MDRD: 61 ML/MIN/1.73
GFR SERPL CREATININE-BSD FRML MDRD: 62 ML/MIN/1.73
GFR SERPL CREATININE-BSD FRML MDRD: 63 ML/MIN/1.73
GFR SERPL CREATININE-BSD FRML MDRD: 64 ML/MIN/1.73
GFR SERPL CREATININE-BSD FRML MDRD: 64 ML/MIN/1.73
GFR SERPL CREATININE-BSD FRML MDRD: 65 ML/MIN/1.73
GFR SERPL CREATININE-BSD FRML MDRD: 66 ML/MIN/1.73
GFR SERPL CREATININE-BSD FRML MDRD: 67 ML/MIN/1.73
GFR SERPL CREATININE-BSD FRML MDRD: 67 ML/MIN/1.73
GFR SERPL CREATININE-BSD FRML MDRD: 69 ML/MIN/1.73
GFR SERPL CREATININE-BSD FRML MDRD: 70 ML/MIN/1.73
GFR SERPL CREATININE-BSD FRML MDRD: 70 ML/MIN/1.73
GFR SERPL CREATININE-BSD FRML MDRD: 71 ML/MIN/1.73
GFR SERPL CREATININE-BSD FRML MDRD: 75 ML/MIN/1.73
GFR SERPL CREATININE-BSD FRML MDRD: 76 ML/MIN/1.73
GFR SERPL CREATININE-BSD FRML MDRD: 78 ML/MIN/1.73
GFR SERPL CREATININE-BSD FRML MDRD: 82 ML/MIN/1.73
GFR SERPL CREATININE-BSD FRML MDRD: 87 ML/MIN/1.73
GLOBULIN UR ELPH-MCNC: 2.5 GM/DL
GLOBULIN UR ELPH-MCNC: 2.6 GM/DL
GLOBULIN UR ELPH-MCNC: 2.8 GM/DL
GLOBULIN UR ELPH-MCNC: 2.9 GM/DL
GLOBULIN UR ELPH-MCNC: 3.1 GM/DL
GLOBULIN UR ELPH-MCNC: 3.2 GM/DL
GLOBULIN UR ELPH-MCNC: 3.2 GM/DL
GLUCOSE BLD-MCNC: 100 MG/DL (ref 65–99)
GLUCOSE BLD-MCNC: 101 MG/DL (ref 65–99)
GLUCOSE BLD-MCNC: 103 MG/DL (ref 65–99)
GLUCOSE BLD-MCNC: 106 MG/DL (ref 65–99)
GLUCOSE BLD-MCNC: 107 MG/DL (ref 65–99)
GLUCOSE BLD-MCNC: 109 MG/DL (ref 65–99)
GLUCOSE BLD-MCNC: 110 MG/DL (ref 65–99)
GLUCOSE BLD-MCNC: 111 MG/DL (ref 65–99)
GLUCOSE BLD-MCNC: 112 MG/DL (ref 65–99)
GLUCOSE BLD-MCNC: 118 MG/DL (ref 65–99)
GLUCOSE BLD-MCNC: 123 MG/DL (ref 65–99)
GLUCOSE BLD-MCNC: 126 MG/DL (ref 65–99)
GLUCOSE BLD-MCNC: 126 MG/DL (ref 65–99)
GLUCOSE BLD-MCNC: 128 MG/DL (ref 65–99)
GLUCOSE BLD-MCNC: 132 MG/DL (ref 65–99)
GLUCOSE BLD-MCNC: 163 MG/DL (ref 65–99)
GLUCOSE BLD-MCNC: 173 MG/DL (ref 65–99)
GLUCOSE BLD-MCNC: 91 MG/DL (ref 65–99)
GLUCOSE BLD-MCNC: 92 MG/DL (ref 65–99)
GLUCOSE BLD-MCNC: 93 MG/DL (ref 65–99)
GLUCOSE BLD-MCNC: 93 MG/DL (ref 65–99)
GLUCOSE BLD-MCNC: 95 MG/DL (ref 65–99)
GLUCOSE BLD-MCNC: 95 MG/DL (ref 65–99)
GLUCOSE BLD-MCNC: 99 MG/DL (ref 65–99)
GLUCOSE BLDC GLUCOMTR-MCNC: 100 MG/DL (ref 70–130)
GLUCOSE BLDC GLUCOMTR-MCNC: 117 MG/DL (ref 70–130)
HADV DNA SPEC NAA+PROBE: NOT DETECTED
HBA1C MFR BLD: 5.2 % (ref 4.8–5.6)
HBA1C MFR BLD: 5.28 % (ref 4.8–5.6)
HBA1C MFR BLD: 5.3 % (ref 4.8–5.6)
HBA1C MFR BLD: 5.8 % (ref 4.8–5.6)
HCO3 BLDA-SCNC: 27.8 MMOL/L (ref 22–28)
HCOV 229E RNA SPEC QL NAA+PROBE: NOT DETECTED
HCOV HKU1 RNA SPEC QL NAA+PROBE: NOT DETECTED
HCOV NL63 RNA SPEC QL NAA+PROBE: NOT DETECTED
HCOV OC43 RNA SPEC QL NAA+PROBE: NOT DETECTED
HCT VFR BLD AUTO: 37.9 % (ref 40.4–52.2)
HCT VFR BLD AUTO: 38 % (ref 40.4–52.2)
HCT VFR BLD AUTO: 38.4 % (ref 40.4–52.2)
HCT VFR BLD AUTO: 38.5 % (ref 40.4–52.2)
HCT VFR BLD AUTO: 38.6 % (ref 40.4–52.2)
HCT VFR BLD AUTO: 38.9 % (ref 40.4–52.2)
HCT VFR BLD AUTO: 39 % (ref 40.4–52.2)
HCT VFR BLD AUTO: 39.1 % (ref 40.4–52.2)
HCT VFR BLD AUTO: 39.1 % (ref 40.4–52.2)
HCT VFR BLD AUTO: 39.5 % (ref 35–60)
HCT VFR BLD AUTO: 39.6 % (ref 40.4–52.2)
HCT VFR BLD AUTO: 40.1 % (ref 40.4–52.2)
HCT VFR BLD AUTO: 40.2 % (ref 40.4–52.2)
HCT VFR BLD AUTO: 40.8 % (ref 40.4–52.2)
HCT VFR BLD AUTO: 41.3 % (ref 40.4–52.2)
HCT VFR BLD AUTO: 41.3 % (ref 40.4–52.2)
HCT VFR BLD AUTO: 41.7 % (ref 40.4–52.2)
HCT VFR BLD AUTO: 41.9 % (ref 35–60)
HCT VFR BLD AUTO: 42.3 % (ref 40.4–52.2)
HCT VFR BLD AUTO: 42.4 % (ref 40.4–52.2)
HCT VFR BLD AUTO: 42.6 % (ref 40.4–52.2)
HCT VFR BLD AUTO: 42.7 % (ref 40.4–52.2)
HCT VFR BLD AUTO: 43.6 % (ref 40.4–52.2)
HCT VFR BLD AUTO: 44.3 % (ref 40.4–52.2)
HCT VFR BLD AUTO: 45.8 % (ref 40.4–52.2)
HCT VFR BLDA CALC: 36 % (ref 38–51)
HCT VFR BLDA CALC: 36 % (ref 38–51)
HCT VFR BLDA CALC: 37 % (ref 38–51)
HCT VFR BLDA CALC: 37 % (ref 38–51)
HCT VFR BLDA CALC: 38 % (ref 38–51)
HCT VFR BLDA CALC: 40 % (ref 38–51)
HDLC SERPL-MCNC: 20 MG/DL (ref 40–60)
HDLC SERPL-MCNC: 21 MG/DL (ref 40–60)
HDLC SERPL-MCNC: 23 MG/DL (ref 40–60)
HDLC SERPL-MCNC: 24 MG/DL (ref 40–60)
HDLC SERPL-MCNC: 31 MG/DL (ref 40–60)
HDLC SERPL-MCNC: 33 MG/DL (ref 40–60)
HGB BLD-MCNC: 12 G/DL (ref 13.7–17.6)
HGB BLD-MCNC: 12.1 G/DL (ref 13.7–17.6)
HGB BLD-MCNC: 12.1 G/DL (ref 13.7–17.6)
HGB BLD-MCNC: 12.3 G/DL (ref 13.7–17.6)
HGB BLD-MCNC: 12.4 G/DL (ref 13.7–17.6)
HGB BLD-MCNC: 12.4 G/DL (ref 13.7–17.6)
HGB BLD-MCNC: 12.6 G/DL (ref 13.7–17.6)
HGB BLD-MCNC: 12.7 G/DL (ref 13.5–18)
HGB BLD-MCNC: 12.8 G/DL (ref 13.7–17.6)
HGB BLD-MCNC: 12.9 G/DL (ref 13.7–17.6)
HGB BLD-MCNC: 13 G/DL (ref 13.7–17.6)
HGB BLD-MCNC: 13 G/DL (ref 13.7–17.6)
HGB BLD-MCNC: 13.1 G/DL (ref 13.7–17.6)
HGB BLD-MCNC: 13.2 G/DL (ref 13.7–17.6)
HGB BLD-MCNC: 13.2 G/DL (ref 13.7–17.6)
HGB BLD-MCNC: 13.4 G/DL (ref 13.5–18)
HGB BLD-MCNC: 13.5 G/DL (ref 13.7–17.6)
HGB BLD-MCNC: 13.7 G/DL (ref 13.7–17.6)
HGB BLD-MCNC: 13.7 G/DL (ref 13.7–17.6)
HGB BLD-MCNC: 13.8 G/DL (ref 13.7–17.6)
HGB BLD-MCNC: 13.9 G/DL (ref 13.7–17.6)
HGB BLD-MCNC: 14.5 G/DL (ref 13.7–17.6)
HGB BLDA-MCNC: 12.2 G/DL (ref 12–17)
HGB BLDA-MCNC: 12.2 G/DL (ref 12–17)
HGB BLDA-MCNC: 12.6 G/DL (ref 12–17)
HGB BLDA-MCNC: 12.6 G/DL (ref 12–17)
HGB BLDA-MCNC: 12.9 G/DL (ref 12–17)
HGB BLDA-MCNC: 13.6 G/DL (ref 12–17)
HMPV RNA NPH QL NAA+NON-PROBE: NOT DETECTED
HOLD SPECIMEN: NORMAL
HPIV1 RNA SPEC QL NAA+PROBE: NOT DETECTED
HPIV2 RNA SPEC QL NAA+PROBE: NOT DETECTED
HPIV3 RNA NPH QL NAA+PROBE: NOT DETECTED
HPIV4 P GENE NPH QL NAA+PROBE: NOT DETECTED
IMM GRANULOCYTES # BLD: 0.02 10*3/MM3 (ref 0–0.03)
IMM GRANULOCYTES # BLD: 0.04 10*3/MM3 (ref 0–0.03)
IMM GRANULOCYTES # BLD: 0.04 10*3/MM3 (ref 0–0.03)
IMM GRANULOCYTES # BLD: 0.05 10*3/MM3 (ref 0–0.03)
IMM GRANULOCYTES # BLD: 0.05 10*3/MM3 (ref 0–0.03)
IMM GRANULOCYTES # BLD: 0.06 10*3/MM3 (ref 0–0.03)
IMM GRANULOCYTES # BLD: 0.06 10*3/MM3 (ref 0–0.03)
IMM GRANULOCYTES # BLD: 0.1 10*3/MM3 (ref 0–0.03)
IMM GRANULOCYTES # BLD: 0.11 10*3/MM3 (ref 0–0.03)
IMM GRANULOCYTES # BLD: 0.11 10*3/MM3 (ref 0–0.03)
IMM GRANULOCYTES # BLD: 0.12 10*3/MM3 (ref 0–0.03)
IMM GRANULOCYTES # BLD: 0.13 10*3/MM3 (ref 0–0.03)
IMM GRANULOCYTES # BLD: 0.14 10*3/MM3 (ref 0–0.03)
IMM GRANULOCYTES NFR BLD: 0.2 % (ref 0–0.5)
IMM GRANULOCYTES NFR BLD: 0.5 % (ref 0–0.5)
IMM GRANULOCYTES NFR BLD: 0.6 % (ref 0–0.5)
IMM GRANULOCYTES NFR BLD: 0.8 % (ref 0–0.5)
IMM GRANULOCYTES NFR BLD: 0.9 % (ref 0–0.5)
IMM GRANULOCYTES NFR BLD: 0.9 % (ref 0–0.5)
IMM GRANULOCYTES NFR BLD: 1 % (ref 0–0.5)
IMM GRANULOCYTES NFR BLD: 1.1 % (ref 0–0.5)
IMM GRANULOCYTES NFR BLD: 1.4 % (ref 0–0.5)
INR PPP: 1.08 (ref 0.9–1.1)
INR PPP: 1.09 (ref 0.9–1.1)
INR PPP: 1.11 (ref 0.9–1.1)
INR PPP: 1.13 (ref 0.9–1.1)
INR PPP: 1.17 (ref 0.9–1.1)
INR PPP: 1.18 (ref 0.9–1.1)
INR PPP: 1.22 (ref 0.9–1.1)
INR PPP: 1.22 (ref 0.9–1.1)
INR PPP: 1.25 (ref 0.9–1.1)
INR PPP: 1.32 (ref 0.9–1.1)
INR PPP: 1.47 (ref 0.9–1.1)
INR PPP: 1.5 (ref 0.9–1.1)
INR PPP: 1.76 (ref 0.9–1.1)
INR PPP: 1.87 (ref 0.9–1.1)
INR PPP: 1.87 (ref 0.9–1.1)
INR PPP: 2 (ref 0.9–1.1)
INR PPP: 2 (ref 0.9–1.1)
INR PPP: 2.08 (ref 0.9–1.1)
INR PPP: 2.17 (ref 0.9–1.1)
INR PPP: 2.24 (ref 0.9–1.1)
INR PPP: 2.3 (ref 0.9–1.1)
INR PPP: 2.32 (ref 0.9–1.1)
INR PPP: 2.36 (ref 0.9–1.1)
INR PPP: 2.5 (ref 0.9–1.1)
INR PPP: 2.6 (ref 0.9–1.1)
INR PPP: 2.61 (ref 0.9–1.1)
INR PPP: 2.62 (ref 0.9–1.1)
INR PPP: 2.69 (ref 0.9–1.1)
IRON 24H UR-MRATE: 52 MCG/DL (ref 59–158)
IRON SATN MFR SERPL: 13 % (ref 20–50)
LDLC SERPL CALC-MCNC: 105 MG/DL (ref 0–100)
LDLC SERPL CALC-MCNC: 108 MG/DL (ref 0–100)
LDLC SERPL CALC-MCNC: 138 MG/DL (ref 0–100)
LDLC SERPL CALC-MCNC: 79 MG/DL (ref 0–100)
LDLC SERPL CALC-MCNC: 91 MG/DL (ref 0–100)
LDLC SERPL CALC-MCNC: 99 MG/DL (ref 0–100)
LDLC/HDLC SERPL: 2.56 {RATIO}
LDLC/HDLC SERPL: 3.95 {RATIO}
LDLC/HDLC SERPL: 4.18 {RATIO}
LDLC/HDLC SERPL: 4.49 {RATIO}
LDLC/HDLC SERPL: 4.94 {RATIO}
LDLC/HDLC SERPL: 4.98 {RATIO}
LEFT ATRIUM VOLUME INDEX: 16 ML/M2
LEFT ATRIUM VOLUME INDEX: 19 ML/M2
LEFT ATRIUM VOLUME INDEX: 27 ML/M2
LEFT ATRIUM VOLUME: 5 CM3
LIPASE SERPL-CCNC: 24 U/L (ref 13–60)
LIPASE SERPL-CCNC: 40 U/L (ref 13–60)
LV EF 2D ECHO EST: 55 %
LV EF NUC BP: 70 %
LYMPHOCYTES # BLD AUTO: 1.24 10*3/MM3 (ref 0.9–4.8)
LYMPHOCYTES # BLD AUTO: 1.6 10*3/MM3 (ref 0.9–4.8)
LYMPHOCYTES # BLD AUTO: 1.82 10*3/MM3 (ref 0.9–4.8)
LYMPHOCYTES # BLD AUTO: 1.83 10*3/MM3 (ref 0.9–4.8)
LYMPHOCYTES # BLD AUTO: 1.93 10*3/MM3 (ref 0.9–4.8)
LYMPHOCYTES # BLD AUTO: 1.94 10*3/MM3 (ref 0.9–4.8)
LYMPHOCYTES # BLD AUTO: 1.98 10*3/MM3 (ref 0.9–4.8)
LYMPHOCYTES # BLD AUTO: 2.03 10*3/MM3 (ref 0.9–4.8)
LYMPHOCYTES # BLD AUTO: 2.36 10*3/MM3 (ref 0.9–4.8)
LYMPHOCYTES # BLD AUTO: 2.38 10*3/MM3 (ref 0.9–4.8)
LYMPHOCYTES # BLD AUTO: 2.4 10*3/MM3 (ref 1.2–3.4)
LYMPHOCYTES # BLD AUTO: 2.68 10*3/MM3 (ref 0.9–4.8)
LYMPHOCYTES # BLD AUTO: 3.16 10*3/MM3 (ref 0.9–4.8)
LYMPHOCYTES # BLD AUTO: 3.3 10*3/MM3 (ref 1.2–3.4)
LYMPHOCYTES # BLD AUTO: 3.41 10*3/MM3 (ref 0.9–4.8)
LYMPHOCYTES NFR BLD AUTO: 12.3 % (ref 19.6–45.3)
LYMPHOCYTES NFR BLD AUTO: 15.5 % (ref 19.6–45.3)
LYMPHOCYTES NFR BLD AUTO: 16.9 % (ref 19.6–45.3)
LYMPHOCYTES NFR BLD AUTO: 17.1 % (ref 19.6–45.3)
LYMPHOCYTES NFR BLD AUTO: 18.2 % (ref 19.6–45.3)
LYMPHOCYTES NFR BLD AUTO: 18.4 % (ref 19.6–45.3)
LYMPHOCYTES NFR BLD AUTO: 20.5 % (ref 19.6–45.3)
LYMPHOCYTES NFR BLD AUTO: 20.5 % (ref 19.6–45.3)
LYMPHOCYTES NFR BLD AUTO: 21.5 % (ref 21–51)
LYMPHOCYTES NFR BLD AUTO: 22 % (ref 19.6–45.3)
LYMPHOCYTES NFR BLD AUTO: 22.9 % (ref 21–51)
LYMPHOCYTES NFR BLD AUTO: 23 % (ref 19.6–45.3)
LYMPHOCYTES NFR BLD AUTO: 25.3 % (ref 19.6–45.3)
LYMPHOCYTES NFR BLD AUTO: 26.3 % (ref 19.6–45.3)
LYMPHOCYTES NFR BLD AUTO: 29.5 % (ref 19.6–45.3)
Lab: NORMAL
M PNEUMO IGG SER IA-ACNC: NOT DETECTED
MAGNESIUM SERPL-MCNC: 2.2 MG/DL (ref 1.6–2.4)
MAGNESIUM SERPL-MCNC: 2.3 MG/DL (ref 1.6–2.4)
MAGNESIUM SERPL-MCNC: 2.4 MG/DL (ref 1.6–2.4)
MAXIMAL PREDICTED HEART RATE: 151 BPM
MCH RBC QN AUTO: 27.6 PG (ref 26.1–33.1)
MCH RBC QN AUTO: 27.6 PG (ref 27–32.7)
MCH RBC QN AUTO: 27.8 PG (ref 27–32.7)
MCH RBC QN AUTO: 28.5 PG (ref 27–32.7)
MCH RBC QN AUTO: 28.7 PG (ref 27–32.7)
MCH RBC QN AUTO: 28.8 PG (ref 27–32.7)
MCH RBC QN AUTO: 28.8 PG (ref 27–32.7)
MCH RBC QN AUTO: 28.9 PG (ref 26.1–33.1)
MCH RBC QN AUTO: 28.9 PG (ref 27–32.7)
MCH RBC QN AUTO: 28.9 PG (ref 27–32.7)
MCH RBC QN AUTO: 29 PG (ref 27–32.7)
MCH RBC QN AUTO: 29.1 PG (ref 27–32.7)
MCH RBC QN AUTO: 29.4 PG (ref 27–32.7)
MCH RBC QN AUTO: 29.5 PG (ref 27–32.7)
MCH RBC QN AUTO: 29.5 PG (ref 27–32.7)
MCH RBC QN AUTO: 29.6 PG (ref 27–32.7)
MCH RBC QN AUTO: 29.9 PG (ref 27–32.7)
MCH RBC QN AUTO: 30 PG (ref 27–32.7)
MCH RBC QN AUTO: 30.2 PG (ref 27–32.7)
MCHC RBC AUTO-ENTMCNC: 30.3 G/DL (ref 32.6–36.4)
MCHC RBC AUTO-ENTMCNC: 30.5 G/DL (ref 32.6–36.4)
MCHC RBC AUTO-ENTMCNC: 30.8 G/DL (ref 32.6–36.4)
MCHC RBC AUTO-ENTMCNC: 31.3 G/DL (ref 32.6–36.4)
MCHC RBC AUTO-ENTMCNC: 31.4 G/DL (ref 32.6–36.4)
MCHC RBC AUTO-ENTMCNC: 31.5 G/DL (ref 32.6–36.4)
MCHC RBC AUTO-ENTMCNC: 31.5 G/DL (ref 32.6–36.4)
MCHC RBC AUTO-ENTMCNC: 31.6 G/DL (ref 32.6–36.4)
MCHC RBC AUTO-ENTMCNC: 31.7 G/DL (ref 32.6–36.4)
MCHC RBC AUTO-ENTMCNC: 31.8 G/DL (ref 32.6–36.4)
MCHC RBC AUTO-ENTMCNC: 31.8 G/DL (ref 32.6–36.4)
MCHC RBC AUTO-ENTMCNC: 31.9 G/DL (ref 32.6–36.4)
MCHC RBC AUTO-ENTMCNC: 31.9 G/DL (ref 32.6–36.4)
MCHC RBC AUTO-ENTMCNC: 32 G/DL (ref 32.6–36.4)
MCHC RBC AUTO-ENTMCNC: 32 G/DL (ref 33–37)
MCHC RBC AUTO-ENTMCNC: 32.1 G/DL (ref 32.6–36.4)
MCHC RBC AUTO-ENTMCNC: 32.1 G/DL (ref 33–37)
MCHC RBC AUTO-ENTMCNC: 32.2 G/DL (ref 32.6–36.4)
MCHC RBC AUTO-ENTMCNC: 32.4 G/DL (ref 32.6–36.4)
MCV RBC AUTO: 86 FL (ref 80–99)
MCV RBC AUTO: 88.7 FL (ref 79.8–96.2)
MCV RBC AUTO: 89.3 FL (ref 79.8–96.2)
MCV RBC AUTO: 90 FL (ref 79.8–96.2)
MCV RBC AUTO: 90.3 FL (ref 79.8–96.2)
MCV RBC AUTO: 90.3 FL (ref 80–99)
MCV RBC AUTO: 90.7 FL (ref 79.8–96.2)
MCV RBC AUTO: 90.7 FL (ref 79.8–96.2)
MCV RBC AUTO: 91 FL (ref 79.8–96.2)
MCV RBC AUTO: 91.1 FL (ref 79.8–96.2)
MCV RBC AUTO: 91.1 FL (ref 79.8–96.2)
MCV RBC AUTO: 91.2 FL (ref 79.8–96.2)
MCV RBC AUTO: 91.8 FL (ref 79.8–96.2)
MCV RBC AUTO: 91.8 FL (ref 79.8–96.2)
MCV RBC AUTO: 92 FL (ref 79.8–96.2)
MCV RBC AUTO: 92.3 FL (ref 79.8–96.2)
MCV RBC AUTO: 93.4 FL (ref 79.8–96.2)
MCV RBC AUTO: 93.4 FL (ref 79.8–96.2)
MCV RBC AUTO: 93.9 FL (ref 79.8–96.2)
MCV RBC AUTO: 94.2 FL (ref 79.8–96.2)
MCV RBC AUTO: 94.3 FL (ref 79.8–96.2)
MCV RBC AUTO: 94.8 FL (ref 79.8–96.2)
MCV RBC AUTO: 94.8 FL (ref 79.8–96.2)
MCV RBC AUTO: 95.3 FL (ref 79.8–96.2)
MCV RBC AUTO: 96.6 FL (ref 79.8–96.2)
MODALITY: ABNORMAL
MONOCYTES # BLD AUTO: 0.64 10*3/MM3 (ref 0.2–1.2)
MONOCYTES # BLD AUTO: 0.67 10*3/MM3 (ref 0.2–1.2)
MONOCYTES # BLD AUTO: 0.71 10*3/MM3 (ref 0.2–1.2)
MONOCYTES # BLD AUTO: 0.8 10*3/MM3 (ref 0.1–0.6)
MONOCYTES # BLD AUTO: 0.8 10*3/MM3 (ref 0.1–0.6)
MONOCYTES # BLD AUTO: 0.87 10*3/MM3 (ref 0.2–1.2)
MONOCYTES # BLD AUTO: 0.95 10*3/MM3 (ref 0.2–1.2)
MONOCYTES # BLD AUTO: 0.97 10*3/MM3 (ref 0.2–1.2)
MONOCYTES # BLD AUTO: 1 10*3/MM3 (ref 0.2–1.2)
MONOCYTES # BLD AUTO: 1.02 10*3/MM3 (ref 0.2–1.2)
MONOCYTES # BLD AUTO: 1.03 10*3/MM3 (ref 0.2–1.2)
MONOCYTES # BLD AUTO: 1.1 10*3/MM3 (ref 0.2–1.2)
MONOCYTES # BLD AUTO: 1.15 10*3/MM3 (ref 0.2–1.2)
MONOCYTES # BLD AUTO: 1.15 10*3/MM3 (ref 0.2–1.2)
MONOCYTES # BLD AUTO: 1.43 10*3/MM3 (ref 0.2–1.2)
MONOCYTES NFR BLD AUTO: 10.9 % (ref 5–12)
MONOCYTES NFR BLD AUTO: 11.5 % (ref 5–12)
MONOCYTES NFR BLD AUTO: 12.7 % (ref 5–12)
MONOCYTES NFR BLD AUTO: 5.1 % (ref 2–9)
MONOCYTES NFR BLD AUTO: 6.2 % (ref 5–12)
MONOCYTES NFR BLD AUTO: 7.2 % (ref 5–12)
MONOCYTES NFR BLD AUTO: 8.1 % (ref 2–9)
MONOCYTES NFR BLD AUTO: 8.1 % (ref 5–12)
MONOCYTES NFR BLD AUTO: 8.1 % (ref 5–12)
MONOCYTES NFR BLD AUTO: 8.5 % (ref 5–12)
MONOCYTES NFR BLD AUTO: 8.8 % (ref 5–12)
MONOCYTES NFR BLD AUTO: 9.1 % (ref 5–12)
MONOCYTES NFR BLD AUTO: 9.1 % (ref 5–12)
MONOCYTES NFR BLD AUTO: 9.2 % (ref 5–12)
MONOCYTES NFR BLD AUTO: 9.6 % (ref 5–12)
NEUTROPHILS # BLD AUTO: 11.2 10*3/MM3 (ref 1.4–6.5)
NEUTROPHILS # BLD AUTO: 4.32 10*3/MM3 (ref 1.9–8.1)
NEUTROPHILS # BLD AUTO: 5.99 10*3/MM3 (ref 1.9–8.1)
NEUTROPHILS # BLD AUTO: 6.29 10*3/MM3 (ref 1.9–8.1)
NEUTROPHILS # BLD AUTO: 6.31 10*3/MM3 (ref 1.9–8.1)
NEUTROPHILS # BLD AUTO: 6.79 10*3/MM3 (ref 1.9–8.1)
NEUTROPHILS # BLD AUTO: 7.1 10*3/MM3 (ref 1.4–6.5)
NEUTROPHILS # BLD AUTO: 7.37 10*3/MM3 (ref 1.9–8.1)
NEUTROPHILS # BLD AUTO: 7.58 10*3/MM3 (ref 1.9–8.1)
NEUTROPHILS # BLD AUTO: 7.91 10*3/MM3 (ref 1.9–8.1)
NEUTROPHILS # BLD AUTO: 8.06 10*3/MM3 (ref 1.9–8.1)
NEUTROPHILS # BLD AUTO: 8.15 10*3/MM3 (ref 1.9–8.1)
NEUTROPHILS # BLD AUTO: 8.7 10*3/MM3 (ref 1.9–8.1)
NEUTROPHILS # BLD AUTO: 8.9 10*3/MM3 (ref 1.9–8.1)
NEUTROPHILS # BLD AUTO: 9.01 10*3/MM3 (ref 1.9–8.1)
NEUTROPHILS NFR BLD AUTO: 53.9 % (ref 42.7–76)
NEUTROPHILS NFR BLD AUTO: 63.1 % (ref 42.7–76)
NEUTROPHILS NFR BLD AUTO: 64.8 % (ref 42.7–76)
NEUTROPHILS NFR BLD AUTO: 65.5 % (ref 42.7–76)
NEUTROPHILS NFR BLD AUTO: 66.4 % (ref 42.7–76)
NEUTROPHILS NFR BLD AUTO: 69 % (ref 42–75)
NEUTROPHILS NFR BLD AUTO: 69.1 % (ref 42.7–76)
NEUTROPHILS NFR BLD AUTO: 70.7 % (ref 42.7–76)
NEUTROPHILS NFR BLD AUTO: 71.3 % (ref 42.7–76)
NEUTROPHILS NFR BLD AUTO: 71.5 % (ref 42.7–76)
NEUTROPHILS NFR BLD AUTO: 71.6 % (ref 42.7–76)
NEUTROPHILS NFR BLD AUTO: 72.4 % (ref 42.7–76)
NEUTROPHILS NFR BLD AUTO: 72.9 % (ref 42.7–76)
NEUTROPHILS NFR BLD AUTO: 73.4 % (ref 42–75)
NEUTROPHILS NFR BLD AUTO: 74.8 % (ref 42.7–76)
NRBC BLD MANUAL-RTO: 0 /100 WBC (ref 0–0)
NT-PROBNP SERPL-MCNC: 1107 PG/ML (ref 0–900)
NT-PROBNP SERPL-MCNC: 1182 PG/ML (ref 5–900)
NT-PROBNP SERPL-MCNC: 1338 PG/ML (ref 0–900)
NT-PROBNP SERPL-MCNC: 134.4 PG/ML (ref 5–900)
NT-PROBNP SERPL-MCNC: 1342 PG/ML (ref 5–900)
NT-PROBNP SERPL-MCNC: 2319 PG/ML (ref 0–900)
NT-PROBNP SERPL-MCNC: 287.5 PG/ML (ref 0–900)
NT-PROBNP SERPL-MCNC: 303.7 PG/ML (ref 5–900)
NT-PROBNP SERPL-MCNC: 374.3 PG/ML (ref 0–900)
NT-PROBNP SERPL-MCNC: 419.7 PG/ML (ref 5–900)
NT-PROBNP SERPL-MCNC: 482.8 PG/ML (ref 0–900)
NT-PROBNP SERPL-MCNC: 580.5 PG/ML (ref 5–900)
NT-PROBNP SERPL-MCNC: 593 PG/ML (ref 0–900)
NT-PROBNP SERPL-MCNC: 596.2 PG/ML (ref 5–900)
NT-PROBNP SERPL-MCNC: 643.1 PG/ML (ref 0–900)
PCO2 BLDA: 40.9 MM HG (ref 35–45)
PERCENT MAX PREDICTED HR: 56.29 %
PH BLDA: 7.44 PH UNITS (ref 7.35–7.45)
PHOSPHATE SERPL-MCNC: 3.9 MG/DL (ref 2.5–4.5)
PHOSPHATE SERPL-MCNC: 4 MG/DL (ref 2.5–4.5)
PLATELET # BLD AUTO: 146 10*3/MM3 (ref 140–500)
PLATELET # BLD AUTO: 151 10*3/MM3 (ref 140–500)
PLATELET # BLD AUTO: 162 10*3/MM3 (ref 140–500)
PLATELET # BLD AUTO: 164 10*3/MM3 (ref 140–500)
PLATELET # BLD AUTO: 164 10*3/MM3 (ref 140–500)
PLATELET # BLD AUTO: 169 10*3/MM3 (ref 140–500)
PLATELET # BLD AUTO: 178 10*3/MM3 (ref 140–500)
PLATELET # BLD AUTO: 182 10*3/MM3 (ref 140–500)
PLATELET # BLD AUTO: 186 10*3/MM3 (ref 140–500)
PLATELET # BLD AUTO: 187 10*3/MM3 (ref 140–500)
PLATELET # BLD AUTO: 187 10*3/MM3 (ref 140–500)
PLATELET # BLD AUTO: 190 10*3/MM3 (ref 140–500)
PLATELET # BLD AUTO: 191 10*3/MM3 (ref 140–500)
PLATELET # BLD AUTO: 193 10*3/MM3 (ref 140–500)
PLATELET # BLD AUTO: 194 10*3/MM3 (ref 140–500)
PLATELET # BLD AUTO: 198 10*3/MM3 (ref 140–500)
PLATELET # BLD AUTO: 199 10*3/MM3 (ref 140–500)
PLATELET # BLD AUTO: 201 10*3/MM3 (ref 140–500)
PLATELET # BLD AUTO: 206 10*3/MM3 (ref 140–500)
PLATELET # BLD AUTO: 218 10*3/MM3 (ref 140–500)
PLATELET # BLD AUTO: 221 10*3/MM3 (ref 140–500)
PLATELET # BLD AUTO: 224 10*3/MM3 (ref 140–500)
PLATELET # BLD AUTO: 231 10*3/MM3 (ref 150–450)
PLATELET # BLD AUTO: 231 10*3/MM3 (ref 150–450)
PLATELET # BLD AUTO: 234 10*3/MM3 (ref 140–500)
PMV BLD AUTO: 10 FL (ref 6–12)
PMV BLD AUTO: 10.1 FL (ref 6–12)
PMV BLD AUTO: 6.7 FL (ref 7.1–10.5)
PMV BLD AUTO: 6.7 FL (ref 7.1–10.5)
PMV BLD AUTO: 9.2 FL (ref 6–12)
PMV BLD AUTO: 9.3 FL (ref 6–12)
PMV BLD AUTO: 9.5 FL (ref 6–12)
PMV BLD AUTO: 9.6 FL (ref 6–12)
PMV BLD AUTO: 9.7 FL (ref 6–12)
PMV BLD AUTO: 9.8 FL (ref 6–12)
PMV BLD AUTO: 9.9 FL (ref 6–12)
PO2 BLDA: 49.6 MM HG (ref 80–100)
POTASSIUM BLD-SCNC: 3.5 MMOL/L (ref 3.5–5.2)
POTASSIUM BLD-SCNC: 3.5 MMOL/L (ref 3.5–5.2)
POTASSIUM BLD-SCNC: 3.6 MMOL/L (ref 3.5–5.2)
POTASSIUM BLD-SCNC: 3.6 MMOL/L (ref 3.5–5.2)
POTASSIUM BLD-SCNC: 3.7 MMOL/L (ref 3.5–5.2)
POTASSIUM BLD-SCNC: 3.8 MMOL/L (ref 3.5–5.2)
POTASSIUM BLD-SCNC: 3.9 MMOL/L (ref 3.5–5.2)
POTASSIUM BLD-SCNC: 4 MMOL/L (ref 3.5–5.2)
POTASSIUM BLD-SCNC: 4 MMOL/L (ref 3.5–5.2)
POTASSIUM BLD-SCNC: 4.1 MMOL/L (ref 3.5–5.2)
POTASSIUM BLD-SCNC: 4.2 MMOL/L (ref 3.5–5.2)
POTASSIUM BLD-SCNC: 4.2 MMOL/L (ref 3.5–5.2)
POTASSIUM BLD-SCNC: 4.3 MMOL/L (ref 3.5–5.2)
POTASSIUM BLD-SCNC: 4.3 MMOL/L (ref 3.5–5.2)
POTASSIUM BLD-SCNC: 4.4 MMOL/L (ref 3.5–5.2)
POTASSIUM BLD-SCNC: 4.5 MMOL/L (ref 3.5–5.2)
POTASSIUM BLD-SCNC: 4.6 MMOL/L (ref 3.5–5.2)
POTASSIUM BLD-SCNC: 5.1 MMOL/L (ref 3.5–5.2)
PROCALCITONIN SERPL-MCNC: 0.05 NG/ML (ref 0.1–0.25)
PROT SERPL-MCNC: 6.3 G/DL (ref 6–8.5)
PROT SERPL-MCNC: 6.5 G/DL (ref 6–8.5)
PROT SERPL-MCNC: 6.5 G/DL (ref 6–8.5)
PROT SERPL-MCNC: 6.6 G/DL (ref 6–8.5)
PROT SERPL-MCNC: 6.8 G/DL (ref 6–8.5)
PROT SERPL-MCNC: 6.9 G/DL (ref 6–8.5)
PROT SERPL-MCNC: 7 G/DL (ref 6–8.5)
PROT SERPL-MCNC: 7.2 G/DL (ref 6–8.5)
PROT SERPL-MCNC: 7.3 G/DL (ref 6–8.5)
PROT SERPL-MCNC: 7.3 G/DL (ref 6–8.5)
PROT SERPL-MCNC: 7.8 G/DL (ref 6–8.5)
PROTHROMBIN TIME: 13.6 SECONDS (ref 11.7–14.2)
PROTHROMBIN TIME: 13.7 SECONDS (ref 11.7–14.2)
PROTHROMBIN TIME: 13.9 SECONDS (ref 11.7–14.2)
PROTHROMBIN TIME: 14.1 SECONDS (ref 11.7–14.2)
PROTHROMBIN TIME: 14.5 SECONDS (ref 11.7–14.2)
PROTHROMBIN TIME: 14.6 SECONDS (ref 11.7–14.2)
PROTHROMBIN TIME: 15 SECONDS (ref 11.7–14.2)
PROTHROMBIN TIME: 15 SECONDS (ref 11.7–14.2)
PROTHROMBIN TIME: 15.3 SECONDS (ref 11.7–14.2)
PROTHROMBIN TIME: 15.9 SECONDS (ref 11.7–14.2)
PROTHROMBIN TIME: 17.3 SECONDS (ref 11.7–14.2)
PROTHROMBIN TIME: 19.9 SECONDS (ref 11.7–14.2)
PROTHROMBIN TIME: 20.8 SECONDS (ref 11.7–14.2)
PROTHROMBIN TIME: 20.9 SECONDS (ref 11.7–14.2)
PROTHROMBIN TIME: 22.7 SECONDS (ref 11.7–14.2)
PROTHROMBIN TIME: 23.5 SECONDS (ref 11.7–14.2)
PROTHROMBIN TIME: 24 SECONDS (ref 11.7–14.2)
PROTHROMBIN TIME: 24.7 SECONDS (ref 11.7–14.2)
PROTHROMBIN TIME: 25 SECONDS (ref 11.7–14.2)
PROTHROMBIN TIME: 27.1 SECONDS (ref 11.7–14.2)
PROTHROMBIN TIME: 27.1 SECONDS (ref 11.7–14.2)
PROTHROMBIN TIME: 27.7 SECONDS (ref 11.7–14.2)
RBC # BLD AUTO: 4.05 10*6/MM3 (ref 4.6–6)
RBC # BLD AUTO: 4.12 10*6/MM3 (ref 4.6–6)
RBC # BLD AUTO: 4.14 10*6/MM3 (ref 4.6–6)
RBC # BLD AUTO: 4.16 10*6/MM3 (ref 4.6–6)
RBC # BLD AUTO: 4.28 10*6/MM3 (ref 4.6–6)
RBC # BLD AUTO: 4.29 10*6/MM3 (ref 4.6–6)
RBC # BLD AUTO: 4.3 10*6/MM3 (ref 4.6–6)
RBC # BLD AUTO: 4.31 10*6/MM3 (ref 4.6–6)
RBC # BLD AUTO: 4.35 10*6/MM3 (ref 4.6–6)
RBC # BLD AUTO: 4.38 10*6/MM3 (ref 4.6–6)
RBC # BLD AUTO: 4.4 10*6/MM3 (ref 4.6–6)
RBC # BLD AUTO: 4.4 10*6/MM3 (ref 4.6–6)
RBC # BLD AUTO: 4.41 10*6/MM3 (ref 4.6–6)
RBC # BLD AUTO: 4.42 10*6/MM3 (ref 4.6–6)
RBC # BLD AUTO: 4.5 10*6/MM3 (ref 4.6–6)
RBC # BLD AUTO: 4.5 10*6/MM3 (ref 4.6–6)
RBC # BLD AUTO: 4.53 10*6/MM3 (ref 4.6–6)
RBC # BLD AUTO: 4.53 10*6/MM3 (ref 4.6–6)
RBC # BLD AUTO: 4.57 10*6/MM3 (ref 4.6–6)
RBC # BLD AUTO: 4.6 10*6/MM3 (ref 4.6–6)
RBC # BLD AUTO: 4.6 10*6/MM3 (ref 4–6)
RBC # BLD AUTO: 4.64 10*6/MM3 (ref 4–6)
RBC # BLD AUTO: 4.65 10*6/MM3 (ref 4.6–6)
RBC # BLD AUTO: 4.7 10*6/MM3 (ref 4.6–6)
RBC # BLD AUTO: 4.83 10*6/MM3 (ref 4.6–6)
RHINOVIRUS RNA SPEC NAA+PROBE: NOT DETECTED
RSV RNA NPH QL NAA+NON-PROBE: NOT DETECTED
SAO2 % BLDA: 63 % (ref 95–98)
SAO2 % BLDA: 65 % (ref 95–98)
SAO2 % BLDA: 65 % (ref 95–98)
SAO2 % BLDA: 69 % (ref 95–98)
SAO2 % BLDA: 69 % (ref 95–98)
SAO2 % BLDA: 70 % (ref 95–98)
SAO2 % BLDA: 81 % (ref 95–98)
SAO2 % BLDA: 84 % (ref 95–98)
SAO2 % BLDA: 90 % (ref 95–98)
SAO2 % BLDCOA: 86 % (ref 92–99)
SET MECH RESP RATE: 18
SODIUM BLD-SCNC: 138 MMOL/L (ref 136–145)
SODIUM BLD-SCNC: 139 MMOL/L (ref 136–145)
SODIUM BLD-SCNC: 140 MMOL/L (ref 136–145)
SODIUM BLD-SCNC: 141 MMOL/L (ref 136–145)
SODIUM BLD-SCNC: 141 MMOL/L (ref 136–145)
SODIUM BLD-SCNC: 142 MMOL/L (ref 136–145)
SODIUM BLD-SCNC: 143 MMOL/L (ref 136–145)
SODIUM BLD-SCNC: 144 MMOL/L (ref 136–145)
SODIUM BLD-SCNC: 145 MMOL/L (ref 136–145)
STRESS BASELINE BP: NORMAL MMHG
STRESS BASELINE HR: 62 BPM
STRESS PERCENT HR: 66 %
STRESS POST PEAK BP: NORMAL MMHG
STRESS POST PEAK HR: 85 BPM
STRESS TARGET HR: 128 BPM
T3FREE SERPL-MCNC: 2.57 PG/ML (ref 2–4.4)
T4 FREE SERPL-MCNC: 1.08 NG/DL (ref 0.93–1.7)
TIBC SERPL-MCNC: 389 MCG/DL (ref 298–536)
TRANSFERRIN SERPL-MCNC: 261 MG/DL (ref 200–360)
TRIGL SERPL-MCNC: 150 MG/DL (ref 0–150)
TRIGL SERPL-MCNC: 281 MG/DL (ref 0–150)
TRIGL SERPL-MCNC: 282 MG/DL (ref 0–150)
TRIGL SERPL-MCNC: 286 MG/DL (ref 0–150)
TRIGL SERPL-MCNC: 293 MG/DL (ref 0–150)
TRIGL SERPL-MCNC: 296 MG/DL (ref 0–150)
TROPONIN T SERPL-MCNC: <0.01 NG/ML (ref 0–0.03)
TSH SERPL DL<=0.05 MIU/L-ACNC: 0.62 MIU/ML (ref 0.27–4.2)
TSH SERPL DL<=0.05 MIU/L-ACNC: 1.84 MIU/ML (ref 0.27–4.2)
TSH SERPL DL<=0.05 MIU/L-ACNC: 2.01 MIU/ML (ref 0.27–4.2)
TSH SERPL DL<=0.05 MIU/L-ACNC: 2.26 MIU/ML (ref 0.27–4.2)
TSH SERPL DL<=0.05 MIU/L-ACNC: 3.29 MIU/ML (ref 0.27–4.2)
TSH SERPL DL<=0.05 MIU/L-ACNC: 3.61 MIU/ML (ref 0.27–4.2)
VIT B12 BLD-MCNC: 403 PG/ML (ref 211–946)
VLDLC SERPL-MCNC: 30 MG/DL (ref 5–40)
VLDLC SERPL-MCNC: 56.2 MG/DL (ref 5–40)
VLDLC SERPL-MCNC: 56.4 MG/DL (ref 5–40)
VLDLC SERPL-MCNC: 57.2 MG/DL (ref 5–40)
VLDLC SERPL-MCNC: 58.6 MG/DL (ref 5–40)
VLDLC SERPL-MCNC: 59.2 MG/DL (ref 5–40)
WBC NRBC COR # BLD: 10.1 10*3/MM3 (ref 4.5–10.7)
WBC NRBC COR # BLD: 10.3 10*3/MM3 (ref 4.5–10)
WBC NRBC COR # BLD: 10.35 10*3/MM3 (ref 4.5–10.7)
WBC NRBC COR # BLD: 10.42 10*3/MM3 (ref 4.5–10.7)
WBC NRBC COR # BLD: 10.77 10*3/MM3 (ref 4.5–10.7)
WBC NRBC COR # BLD: 11.04 10*3/MM3 (ref 4.5–10.7)
WBC NRBC COR # BLD: 11.27 10*3/MM3 (ref 4.5–10.7)
WBC NRBC COR # BLD: 12.02 10*3/MM3 (ref 4.5–10.7)
WBC NRBC COR # BLD: 12.48 10*3/MM3 (ref 4.5–10.7)
WBC NRBC COR # BLD: 13.05 10*3/MM3 (ref 4.5–10.7)
WBC NRBC COR # BLD: 13.15 10*3/MM3 (ref 4.5–10.7)
WBC NRBC COR # BLD: 13.46 10*3/MM3 (ref 4.5–10.7)
WBC NRBC COR # BLD: 15.2 10*3/MM3 (ref 4.5–10)
WBC NRBC COR # BLD: 7.49 10*3/MM3 (ref 4.5–10.7)
WBC NRBC COR # BLD: 7.5 10*3/MM3 (ref 4.5–10.7)
WBC NRBC COR # BLD: 7.78 10*3/MM3 (ref 4.5–10.7)
WBC NRBC COR # BLD: 7.81 10*3/MM3 (ref 4.5–10.7)
WBC NRBC COR # BLD: 7.88 10*3/MM3 (ref 4.5–10.7)
WBC NRBC COR # BLD: 8.01 10*3/MM3 (ref 4.5–10.7)
WBC NRBC COR # BLD: 8.02 10*3/MM3 (ref 4.5–10.7)
WBC NRBC COR # BLD: 8.06 10*3/MM3 (ref 4.5–10.7)
WBC NRBC COR # BLD: 8.79 10*3/MM3 (ref 4.5–10.7)
WBC NRBC COR # BLD: 8.93 10*3/MM3 (ref 4.5–10.7)
WBC NRBC COR # BLD: 9.02 10*3/MM3 (ref 4.5–10.7)
WBC NRBC COR # BLD: 9.34 10*3/MM3 (ref 4.5–10.7)
WHOLE BLOOD HOLD SPECIMEN: NORMAL

## 2017-01-01 PROCEDURE — 80048 BASIC METABOLIC PNL TOTAL CA: CPT | Performed by: INTERNAL MEDICINE

## 2017-01-01 PROCEDURE — C1769 GUIDE WIRE: HCPCS | Performed by: INTERNAL MEDICINE

## 2017-01-01 PROCEDURE — 85610 PROTHROMBIN TIME: CPT | Performed by: EMERGENCY MEDICINE

## 2017-01-01 PROCEDURE — 80053 COMPREHEN METABOLIC PANEL: CPT | Performed by: EMERGENCY MEDICINE

## 2017-01-01 PROCEDURE — 85025 COMPLETE CBC W/AUTO DIFF WBC: CPT | Performed by: INTERNAL MEDICINE

## 2017-01-01 PROCEDURE — 99214 OFFICE O/P EST MOD 30 MIN: CPT | Performed by: INTERNAL MEDICINE

## 2017-01-01 PROCEDURE — 93010 ELECTROCARDIOGRAM REPORT: CPT | Performed by: INTERNAL MEDICINE

## 2017-01-01 PROCEDURE — 85610 PROTHROMBIN TIME: CPT | Performed by: PHYSICIAN ASSISTANT

## 2017-01-01 PROCEDURE — 84484 ASSAY OF TROPONIN QUANT: CPT | Performed by: HOSPITALIST

## 2017-01-01 PROCEDURE — 84484 ASSAY OF TROPONIN QUANT: CPT | Performed by: INTERNAL MEDICINE

## 2017-01-01 PROCEDURE — C1874 STENT, COATED/COV W/DEL SYS: HCPCS | Performed by: INTERNAL MEDICINE

## 2017-01-01 PROCEDURE — 85027 COMPLETE CBC AUTOMATED: CPT | Performed by: INTERNAL MEDICINE

## 2017-01-01 PROCEDURE — 93657 TX L/R ATRIAL FIB ADDL: CPT | Performed by: INTERNAL MEDICINE

## 2017-01-01 PROCEDURE — 71020 XR CHEST 2 VW: CPT | Performed by: INTERNAL MEDICINE

## 2017-01-01 PROCEDURE — 83880 ASSAY OF NATRIURETIC PEPTIDE: CPT | Performed by: INTERNAL MEDICINE

## 2017-01-01 PROCEDURE — C1725 CATH, TRANSLUMIN NON-LASER: HCPCS | Performed by: INTERNAL MEDICINE

## 2017-01-01 PROCEDURE — 99232 SBSQ HOSP IP/OBS MODERATE 35: CPT | Performed by: INTERNAL MEDICINE

## 2017-01-01 PROCEDURE — 80048 BASIC METABOLIC PNL TOTAL CA: CPT | Performed by: HOSPITALIST

## 2017-01-01 PROCEDURE — 85610 PROTHROMBIN TIME: CPT | Performed by: INTERNAL MEDICINE

## 2017-01-01 PROCEDURE — 71250 CT THORAX DX C-: CPT

## 2017-01-01 PROCEDURE — 36416 COLLJ CAPILLARY BLOOD SPEC: CPT | Performed by: INTERNAL MEDICINE

## 2017-01-01 PROCEDURE — 82728 ASSAY OF FERRITIN: CPT | Performed by: INTERNAL MEDICINE

## 2017-01-01 PROCEDURE — 02K83ZZ MAP CONDUCTION MECHANISM, PERCUTANEOUS APPROACH: ICD-10-PCS | Performed by: INTERNAL MEDICINE

## 2017-01-01 PROCEDURE — C1894 INTRO/SHEATH, NON-LASER: HCPCS | Performed by: INTERNAL MEDICINE

## 2017-01-01 PROCEDURE — 93460 R&L HRT ART/VENTRICLE ANGIO: CPT | Performed by: INTERNAL MEDICINE

## 2017-01-01 PROCEDURE — C1730 CATH, EP, 19 OR FEW ELECT: HCPCS | Performed by: INTERNAL MEDICINE

## 2017-01-01 PROCEDURE — 85025 COMPLETE CBC W/AUTO DIFF WBC: CPT | Performed by: EMERGENCY MEDICINE

## 2017-01-01 PROCEDURE — 83735 ASSAY OF MAGNESIUM: CPT | Performed by: NURSE PRACTITIONER

## 2017-01-01 PROCEDURE — 80061 LIPID PANEL: CPT | Performed by: INTERNAL MEDICINE

## 2017-01-01 PROCEDURE — 25010000002 PROPOFOL 10 MG/ML EMULSION: Performed by: ANESTHESIOLOGY

## 2017-01-01 PROCEDURE — 99285 EMERGENCY DEPT VISIT HI MDM: CPT

## 2017-01-01 PROCEDURE — 71020 HC CHEST PA AND LATERAL: CPT

## 2017-01-01 PROCEDURE — 84443 ASSAY THYROID STIM HORMONE: CPT | Performed by: NURSE PRACTITIONER

## 2017-01-01 PROCEDURE — 25010000002 MIDAZOLAM PER 1 MG: Performed by: INTERNAL MEDICINE

## 2017-01-01 PROCEDURE — C1732 CATH, EP, DIAG/ABL, 3D/VECT: HCPCS | Performed by: INTERNAL MEDICINE

## 2017-01-01 PROCEDURE — 4A023N8 MEASUREMENT OF CARDIAC SAMPLING AND PRESSURE, BILATERAL, PERCUTANEOUS APPROACH: ICD-10-PCS | Performed by: INTERNAL MEDICINE

## 2017-01-01 PROCEDURE — 99283 EMERGENCY DEPT VISIT LOW MDM: CPT

## 2017-01-01 PROCEDURE — 99284 EMERGENCY DEPT VISIT MOD MDM: CPT

## 2017-01-01 PROCEDURE — 85730 THROMBOPLASTIN TIME PARTIAL: CPT | Performed by: EMERGENCY MEDICINE

## 2017-01-01 PROCEDURE — 93016 CV STRESS TEST SUPVJ ONLY: CPT | Performed by: INTERNAL MEDICINE

## 2017-01-01 PROCEDURE — 85014 HEMATOCRIT: CPT

## 2017-01-01 PROCEDURE — 99223 1ST HOSP IP/OBS HIGH 75: CPT | Performed by: INTERNAL MEDICINE

## 2017-01-01 PROCEDURE — 99213 OFFICE O/P EST LOW 20 MIN: CPT | Performed by: INTERNAL MEDICINE

## 2017-01-01 PROCEDURE — C1893 INTRO/SHEATH, FIXED,NON-PEEL: HCPCS | Performed by: INTERNAL MEDICINE

## 2017-01-01 PROCEDURE — 96375 TX/PRO/DX INJ NEW DRUG ADDON: CPT

## 2017-01-01 PROCEDURE — 94640 AIRWAY INHALATION TREATMENT: CPT

## 2017-01-01 PROCEDURE — 85610 PROTHROMBIN TIME: CPT | Performed by: NURSE PRACTITIONER

## 2017-01-01 PROCEDURE — 83880 ASSAY OF NATRIURETIC PEPTIDE: CPT | Performed by: EMERGENCY MEDICINE

## 2017-01-01 PROCEDURE — 93005 ELECTROCARDIOGRAM TRACING: CPT | Performed by: INTERNAL MEDICINE

## 2017-01-01 PROCEDURE — 93306 TTE W/DOPPLER COMPLETE: CPT

## 2017-01-01 PROCEDURE — 85027 COMPLETE CBC AUTOMATED: CPT | Performed by: HOSPITALIST

## 2017-01-01 PROCEDURE — 82553 CREATINE MB FRACTION: CPT | Performed by: HOSPITALIST

## 2017-01-01 PROCEDURE — 63710000001 PREDNISONE PER 5 MG: Performed by: NURSE PRACTITIONER

## 2017-01-01 PROCEDURE — 86235 NUCLEAR ANTIGEN ANTIBODY: CPT | Performed by: INTERNAL MEDICINE

## 2017-01-01 PROCEDURE — 25010000002 ONDANSETRON PER 1 MG: Performed by: INTERNAL MEDICINE

## 2017-01-01 PROCEDURE — 84484 ASSAY OF TROPONIN QUANT: CPT | Performed by: FAMILY MEDICINE

## 2017-01-01 PROCEDURE — 71010 HC CHEST PA OR AP: CPT

## 2017-01-01 PROCEDURE — 99231 SBSQ HOSP IP/OBS SF/LOW 25: CPT | Performed by: INTERNAL MEDICINE

## 2017-01-01 PROCEDURE — 82962 GLUCOSE BLOOD TEST: CPT

## 2017-01-01 PROCEDURE — 25010000002 HEPARIN (PORCINE) PER 1000 UNITS: Performed by: INTERNAL MEDICINE

## 2017-01-01 PROCEDURE — 84484 ASSAY OF TROPONIN QUANT: CPT | Performed by: EMERGENCY MEDICINE

## 2017-01-01 PROCEDURE — 84100 ASSAY OF PHOSPHORUS: CPT | Performed by: NURSE PRACTITIONER

## 2017-01-01 PROCEDURE — A9500 TC99M SESTAMIBI: HCPCS | Performed by: INTERNAL MEDICINE

## 2017-01-01 PROCEDURE — B2111ZZ FLUOROSCOPY OF MULTIPLE CORONARY ARTERIES USING LOW OSMOLAR CONTRAST: ICD-10-PCS | Performed by: INTERNAL MEDICINE

## 2017-01-01 PROCEDURE — 93306 TTE W/DOPPLER COMPLETE: CPT | Performed by: INTERNAL MEDICINE

## 2017-01-01 PROCEDURE — 93000 ELECTROCARDIOGRAM COMPLETE: CPT | Performed by: INTERNAL MEDICINE

## 2017-01-01 PROCEDURE — 80053 COMPREHEN METABOLIC PANEL: CPT | Performed by: FAMILY MEDICINE

## 2017-01-01 PROCEDURE — 99222 1ST HOSP IP/OBS MODERATE 55: CPT | Performed by: INTERNAL MEDICINE

## 2017-01-01 PROCEDURE — 93971 EXTREMITY STUDY: CPT

## 2017-01-01 PROCEDURE — 85652 RBC SED RATE AUTOMATED: CPT | Performed by: INTERNAL MEDICINE

## 2017-01-01 PROCEDURE — 82550 ASSAY OF CK (CPK): CPT | Performed by: INTERNAL MEDICINE

## 2017-01-01 PROCEDURE — 0 TECHNETIUM SESTAMIBI: Performed by: INTERNAL MEDICINE

## 2017-01-01 PROCEDURE — 84481 FREE ASSAY (FT-3): CPT | Performed by: INTERNAL MEDICINE

## 2017-01-01 PROCEDURE — 85018 HEMOGLOBIN: CPT

## 2017-01-01 PROCEDURE — 93655 ICAR CATH ABLTJ DSCRT ARRHYT: CPT | Performed by: INTERNAL MEDICINE

## 2017-01-01 PROCEDURE — 83690 ASSAY OF LIPASE: CPT | Performed by: NURSE PRACTITIONER

## 2017-01-01 PROCEDURE — 82803 BLOOD GASES ANY COMBINATION: CPT

## 2017-01-01 PROCEDURE — 83540 ASSAY OF IRON: CPT | Performed by: INTERNAL MEDICINE

## 2017-01-01 PROCEDURE — 25010000002 METHYLPREDNISOLONE PER 125 MG: Performed by: PHYSICIAN ASSISTANT

## 2017-01-01 PROCEDURE — 25010000002 DIPHENHYDRAMINE PER 50 MG: Performed by: PHYSICIAN ASSISTANT

## 2017-01-01 PROCEDURE — 84443 ASSAY THYROID STIM HORMONE: CPT | Performed by: INTERNAL MEDICINE

## 2017-01-01 PROCEDURE — 25010000002 ONDANSETRON PER 1 MG: Performed by: ANESTHESIOLOGY

## 2017-01-01 PROCEDURE — 85730 THROMBOPLASTIN TIME PARTIAL: CPT | Performed by: NURSE PRACTITIONER

## 2017-01-01 PROCEDURE — 80053 COMPREHEN METABOLIC PANEL: CPT

## 2017-01-01 PROCEDURE — 25010000002 ONDANSETRON PER 1 MG: Performed by: PHYSICIAN ASSISTANT

## 2017-01-01 PROCEDURE — 94729 DIFFUSING CAPACITY: CPT

## 2017-01-01 PROCEDURE — 83880 ASSAY OF NATRIURETIC PEPTIDE: CPT

## 2017-01-01 PROCEDURE — 82553 CREATINE MB FRACTION: CPT | Performed by: NURSE PRACTITIONER

## 2017-01-01 PROCEDURE — 027034Z DILATION OF CORONARY ARTERY, ONE ARTERY WITH DRUG-ELUTING INTRALUMINAL DEVICE, PERCUTANEOUS APPROACH: ICD-10-PCS | Performed by: INTERNAL MEDICINE

## 2017-01-01 PROCEDURE — 83735 ASSAY OF MAGNESIUM: CPT | Performed by: INTERNAL MEDICINE

## 2017-01-01 PROCEDURE — 36415 COLL VENOUS BLD VENIPUNCTURE: CPT

## 2017-01-01 PROCEDURE — 25010000002 MIDAZOLAM PER 1 MG: Performed by: ANESTHESIOLOGY

## 2017-01-01 PROCEDURE — 80053 COMPREHEN METABOLIC PANEL: CPT | Performed by: NURSE PRACTITIONER

## 2017-01-01 PROCEDURE — 93017 CV STRESS TEST TRACING ONLY: CPT

## 2017-01-01 PROCEDURE — C1731 CATH, EP, 20 OR MORE ELEC: HCPCS | Performed by: INTERNAL MEDICINE

## 2017-01-01 PROCEDURE — 85025 COMPLETE CBC W/AUTO DIFF WBC: CPT

## 2017-01-01 PROCEDURE — 99225 PR SBSQ OBSERVATION CARE/DAY 25 MINUTES: CPT | Performed by: INTERNAL MEDICINE

## 2017-01-01 PROCEDURE — 93005 ELECTROCARDIOGRAM TRACING: CPT

## 2017-01-01 PROCEDURE — 99217 PR OBSERVATION CARE DISCHARGE MANAGEMENT: CPT | Performed by: INTERNAL MEDICINE

## 2017-01-01 PROCEDURE — 84132 ASSAY OF SERUM POTASSIUM: CPT | Performed by: INTERNAL MEDICINE

## 2017-01-01 PROCEDURE — 93613 INTRACARDIAC EPHYS 3D MAPG: CPT | Performed by: INTERNAL MEDICINE

## 2017-01-01 PROCEDURE — 87486 CHLMYD PNEUM DNA AMP PROBE: CPT | Performed by: INTERNAL MEDICINE

## 2017-01-01 PROCEDURE — 96374 THER/PROPH/DIAG INJ IV PUSH: CPT

## 2017-01-01 PROCEDURE — 0 IOPAMIDOL PER 1 ML: Performed by: INTERNAL MEDICINE

## 2017-01-01 PROCEDURE — 25010000002 FUROSEMIDE PER 20 MG: Performed by: INTERNAL MEDICINE

## 2017-01-01 PROCEDURE — A9558 XE133 XENON 10MCI: HCPCS | Performed by: INTERNAL MEDICINE

## 2017-01-01 PROCEDURE — 82550 ASSAY OF CK (CPK): CPT | Performed by: NURSE PRACTITIONER

## 2017-01-01 PROCEDURE — 80053 COMPREHEN METABOLIC PANEL: CPT | Performed by: INTERNAL MEDICINE

## 2017-01-01 PROCEDURE — 92960 CARDIOVERSION ELECTRIC EXT: CPT | Performed by: INTERNAL MEDICINE

## 2017-01-01 PROCEDURE — 25010000002 FUROSEMIDE PER 20 MG: Performed by: EMERGENCY MEDICINE

## 2017-01-01 PROCEDURE — 94799 UNLISTED PULMONARY SVC/PX: CPT

## 2017-01-01 PROCEDURE — 25010000002 PHENYLEPHRINE PER 1 ML: Performed by: ANESTHESIOLOGY

## 2017-01-01 PROCEDURE — 86038 ANTINUCLEAR ANTIBODIES: CPT | Performed by: INTERNAL MEDICINE

## 2017-01-01 PROCEDURE — 0 XENON XE 133: Performed by: INTERNAL MEDICINE

## 2017-01-01 PROCEDURE — 85347 COAGULATION TIME ACTIVATED: CPT

## 2017-01-01 PROCEDURE — 93005 ELECTROCARDIOGRAM TRACING: CPT | Performed by: NURSE PRACTITIONER

## 2017-01-01 PROCEDURE — 99233 SBSQ HOSP IP/OBS HIGH 50: CPT | Performed by: INTERNAL MEDICINE

## 2017-01-01 PROCEDURE — 84145 PROCALCITONIN (PCT): CPT | Performed by: INTERNAL MEDICINE

## 2017-01-01 PROCEDURE — 25010000002 DIPHENHYDRAMINE PER 50 MG: Performed by: NURSE PRACTITIONER

## 2017-01-01 PROCEDURE — 63710000001 PREDNISONE PER 5 MG: Performed by: INTERNAL MEDICINE

## 2017-01-01 PROCEDURE — 25010000002 NEOSTIGMINE 10 MG/10ML SOLUTION: Performed by: ANESTHESIOLOGY

## 2017-01-01 PROCEDURE — 83036 HEMOGLOBIN GLYCOSYLATED A1C: CPT | Performed by: INTERNAL MEDICINE

## 2017-01-01 PROCEDURE — 92928 PRQ TCAT PLMT NTRAC ST 1 LES: CPT | Performed by: INTERNAL MEDICINE

## 2017-01-01 PROCEDURE — 94010 BREATHING CAPACITY TEST: CPT

## 2017-01-01 PROCEDURE — 97161 PT EVAL LOW COMPLEX 20 MIN: CPT | Performed by: PHYSICAL THERAPIST

## 2017-01-01 PROCEDURE — 99153 MOD SED SAME PHYS/QHP EA: CPT | Performed by: INTERNAL MEDICINE

## 2017-01-01 PROCEDURE — A9540 TC99M MAA: HCPCS | Performed by: INTERNAL MEDICINE

## 2017-01-01 PROCEDURE — 02583ZZ DESTRUCTION OF CONDUCTION MECHANISM, PERCUTANEOUS APPROACH: ICD-10-PCS | Performed by: INTERNAL MEDICINE

## 2017-01-01 PROCEDURE — 93308 TTE F-UP OR LMTD: CPT | Performed by: INTERNAL MEDICINE

## 2017-01-01 PROCEDURE — 96372 THER/PROPH/DIAG INJ SC/IM: CPT | Performed by: INTERNAL MEDICINE

## 2017-01-01 PROCEDURE — 99239 HOSP IP/OBS DSCHRG MGMT >30: CPT | Performed by: INTERNAL MEDICINE

## 2017-01-01 PROCEDURE — 93656 COMPRE EP EVAL ABLTJ ATR FIB: CPT | Performed by: INTERNAL MEDICINE

## 2017-01-01 PROCEDURE — G0438 PPPS, INITIAL VISIT: HCPCS | Performed by: INTERNAL MEDICINE

## 2017-01-01 PROCEDURE — 82150 ASSAY OF AMYLASE: CPT | Performed by: NURSE PRACTITIONER

## 2017-01-01 PROCEDURE — 87798 DETECT AGENT NOS DNA AMP: CPT | Performed by: INTERNAL MEDICINE

## 2017-01-01 PROCEDURE — 93018 CV STRESS TEST I&R ONLY: CPT | Performed by: INTERNAL MEDICINE

## 2017-01-01 PROCEDURE — 84439 ASSAY OF FREE THYROXINE: CPT | Performed by: INTERNAL MEDICINE

## 2017-01-01 PROCEDURE — 96361 HYDRATE IV INFUSION ADD-ON: CPT

## 2017-01-01 PROCEDURE — 71275 CT ANGIOGRAPHY CHEST: CPT

## 2017-01-01 PROCEDURE — 84484 ASSAY OF TROPONIN QUANT: CPT | Performed by: NURSE PRACTITIONER

## 2017-01-01 PROCEDURE — C9600 PERC DRUG-EL COR STENT SING: HCPCS | Performed by: INTERNAL MEDICINE

## 2017-01-01 PROCEDURE — 78452 HT MUSCLE IMAGE SPECT MULT: CPT

## 2017-01-01 PROCEDURE — 82553 CREATINE MB FRACTION: CPT | Performed by: INTERNAL MEDICINE

## 2017-01-01 PROCEDURE — 83036 HEMOGLOBIN GLYCOSYLATED A1C: CPT | Performed by: NURSE PRACTITIONER

## 2017-01-01 PROCEDURE — 94726 PLETHYSMOGRAPHY LUNG VOLUMES: CPT

## 2017-01-01 PROCEDURE — 0 TECHNETIUM ALBUMIN AGGREGATED: Performed by: INTERNAL MEDICINE

## 2017-01-01 PROCEDURE — 82607 VITAMIN B-12: CPT | Performed by: INTERNAL MEDICINE

## 2017-01-01 PROCEDURE — 36600 WITHDRAWAL OF ARTERIAL BLOOD: CPT

## 2017-01-01 PROCEDURE — 93005 ELECTROCARDIOGRAM TRACING: CPT | Performed by: EMERGENCY MEDICINE

## 2017-01-01 PROCEDURE — B2161ZZ FLUOROSCOPY OF RIGHT AND LEFT HEART USING LOW OSMOLAR CONTRAST: ICD-10-PCS | Performed by: INTERNAL MEDICINE

## 2017-01-01 PROCEDURE — 84484 ASSAY OF TROPONIN QUANT: CPT

## 2017-01-01 PROCEDURE — 99215 OFFICE O/P EST HI 40 MIN: CPT | Performed by: INTERNAL MEDICINE

## 2017-01-01 PROCEDURE — C1887 CATHETER, GUIDING: HCPCS | Performed by: INTERNAL MEDICINE

## 2017-01-01 PROCEDURE — 36415 COLL VENOUS BLD VENIPUNCTURE: CPT | Performed by: EMERGENCY MEDICINE

## 2017-01-01 PROCEDURE — 25010000002 FENTANYL CITRATE (PF) 100 MCG/2ML SOLUTION: Performed by: INTERNAL MEDICINE

## 2017-01-01 PROCEDURE — 85379 FIBRIN DEGRADATION QUANT: CPT | Performed by: INTERNAL MEDICINE

## 2017-01-01 PROCEDURE — 25010000002 REGADENOSON 0.4 MG/5ML SOLUTION: Performed by: INTERNAL MEDICINE

## 2017-01-01 PROCEDURE — 85025 COMPLETE CBC W/AUTO DIFF WBC: CPT | Performed by: FAMILY MEDICINE

## 2017-01-01 PROCEDURE — 5A2204Z RESTORATION OF CARDIAC RHYTHM, SINGLE: ICD-10-PCS | Performed by: INTERNAL MEDICINE

## 2017-01-01 PROCEDURE — 85379 FIBRIN DEGRADATION QUANT: CPT | Performed by: EMERGENCY MEDICINE

## 2017-01-01 PROCEDURE — 99152 MOD SED SAME PHYS/QHP 5/>YRS: CPT | Performed by: INTERNAL MEDICINE

## 2017-01-01 PROCEDURE — 82746 ASSAY OF FOLIC ACID SERUM: CPT | Performed by: INTERNAL MEDICINE

## 2017-01-01 PROCEDURE — 93005 ELECTROCARDIOGRAM TRACING: CPT | Performed by: HOSPITALIST

## 2017-01-01 PROCEDURE — 25010000002 METHYLPREDNISOLONE PER 125 MG: Performed by: NURSE PRACTITIONER

## 2017-01-01 PROCEDURE — 83880 ASSAY OF NATRIURETIC PEPTIDE: CPT | Performed by: NURSE PRACTITIONER

## 2017-01-01 PROCEDURE — 78582 LUNG VENTILAT&PERFUS IMAGING: CPT

## 2017-01-01 PROCEDURE — 71010 XR CHEST 1 VW: CPT | Performed by: INTERNAL MEDICINE

## 2017-01-01 PROCEDURE — 80053 COMPREHEN METABOLIC PANEL: CPT | Performed by: PHYSICIAN ASSISTANT

## 2017-01-01 PROCEDURE — 99221 1ST HOSP IP/OBS SF/LOW 40: CPT | Performed by: INTERNAL MEDICINE

## 2017-01-01 PROCEDURE — 93308 TTE F-UP OR LMTD: CPT

## 2017-01-01 PROCEDURE — 92960 CARDIOVERSION ELECTRIC EXT: CPT

## 2017-01-01 PROCEDURE — 78452 HT MUSCLE IMAGE SPECT MULT: CPT | Performed by: INTERNAL MEDICINE

## 2017-01-01 PROCEDURE — 85025 COMPLETE CBC W/AUTO DIFF WBC: CPT | Performed by: PHYSICIAN ASSISTANT

## 2017-01-01 PROCEDURE — 82550 ASSAY OF CK (CPK): CPT | Performed by: HOSPITALIST

## 2017-01-01 PROCEDURE — 25010000002 FENTANYL CITRATE (PF) 100 MCG/2ML SOLUTION: Performed by: ANESTHESIOLOGY

## 2017-01-01 PROCEDURE — 84466 ASSAY OF TRANSFERRIN: CPT | Performed by: INTERNAL MEDICINE

## 2017-01-01 PROCEDURE — 87581 M.PNEUMON DNA AMP PROBE: CPT | Performed by: INTERNAL MEDICINE

## 2017-01-01 PROCEDURE — 87633 RESP VIRUS 12-25 TARGETS: CPT | Performed by: INTERNAL MEDICINE

## 2017-01-01 PROCEDURE — 25010000002 DIGOXIN PER 500 MCG: Performed by: INTERNAL MEDICINE

## 2017-01-01 DEVICE — IMPLANTABLE DEVICE: Type: IMPLANTABLE DEVICE | Status: FUNCTIONAL

## 2017-01-01 RX ORDER — TAMSULOSIN HYDROCHLORIDE 0.4 MG/1
0.4 CAPSULE ORAL 2 TIMES DAILY
Status: DISCONTINUED | OUTPATIENT
Start: 2017-01-01 | End: 2017-01-01 | Stop reason: HOSPADM

## 2017-01-01 RX ORDER — PRAVASTATIN SODIUM 40 MG
40 TABLET ORAL NIGHTLY
Qty: 30 TABLET | Refills: 2 | OUTPATIENT
Start: 2017-01-01 | End: 2017-01-01 | Stop reason: SDUPTHER

## 2017-01-01 RX ORDER — HYDROCODONE BITARTRATE AND ACETAMINOPHEN 7.5; 325 MG/1; MG/1
1 TABLET ORAL EVERY 4 HOURS PRN
Qty: 10 TABLET | Refills: 0 | Status: SHIPPED | OUTPATIENT
Start: 2017-01-01 | End: 2017-01-01

## 2017-01-01 RX ORDER — DIGOXIN 0.25 MG/ML
375 INJECTION INTRAMUSCULAR; INTRAVENOUS ONCE
Status: COMPLETED | OUTPATIENT
Start: 2017-01-01 | End: 2017-01-01

## 2017-01-01 RX ORDER — PANTOPRAZOLE SODIUM 40 MG/1
40 TABLET, DELAYED RELEASE ORAL
Status: DISCONTINUED | OUTPATIENT
Start: 2017-01-01 | End: 2017-01-01 | Stop reason: HOSPADM

## 2017-01-01 RX ORDER — HYDROCODONE BITARTRATE AND ACETAMINOPHEN 5; 325 MG/1; MG/1
1 TABLET ORAL EVERY 4 HOURS PRN
Status: DISCONTINUED | OUTPATIENT
Start: 2017-01-01 | End: 2017-01-01

## 2017-01-01 RX ORDER — HYDROMORPHONE HYDROCHLORIDE 1 MG/ML
0.25 INJECTION, SOLUTION INTRAMUSCULAR; INTRAVENOUS; SUBCUTANEOUS
Status: DISCONTINUED | OUTPATIENT
Start: 2017-01-01 | End: 2017-01-01 | Stop reason: HOSPADM

## 2017-01-01 RX ORDER — CLOPIDOGREL BISULFATE 75 MG/1
75 TABLET ORAL DAILY
Status: DISCONTINUED | OUTPATIENT
Start: 2017-01-01 | End: 2017-01-01 | Stop reason: HOSPADM

## 2017-01-01 RX ORDER — FUROSEMIDE 40 MG/1
40 TABLET ORAL DAILY
Qty: 90 TABLET | Refills: 1 | Status: SHIPPED | OUTPATIENT
Start: 2017-01-01 | End: 2017-01-01 | Stop reason: HOSPADM

## 2017-01-01 RX ORDER — MIDAZOLAM HYDROCHLORIDE 1 MG/ML
INJECTION INTRAMUSCULAR; INTRAVENOUS AS NEEDED
Status: DISCONTINUED | OUTPATIENT
Start: 2017-01-01 | End: 2017-01-01 | Stop reason: HOSPADM

## 2017-01-01 RX ORDER — FUROSEMIDE 40 MG/1
40 TABLET ORAL 3 TIMES WEEKLY
Status: DISCONTINUED | OUTPATIENT
Start: 2017-01-01 | End: 2017-01-01 | Stop reason: HOSPADM

## 2017-01-01 RX ORDER — METHYLPREDNISOLONE 4 MG/1
TABLET ORAL
Qty: 1 EACH | Refills: 0 | Status: SHIPPED | OUTPATIENT
Start: 2017-01-01

## 2017-01-01 RX ORDER — LIDOCAINE HYDROCHLORIDE 20 MG/ML
INJECTION, SOLUTION INFILTRATION; PERINEURAL AS NEEDED
Status: DISCONTINUED | OUTPATIENT
Start: 2017-01-01 | End: 2017-01-01 | Stop reason: HOSPADM

## 2017-01-01 RX ORDER — SOTALOL HYDROCHLORIDE 120 MG/1
120 TABLET ORAL EVERY 12 HOURS SCHEDULED
Status: DISCONTINUED | OUTPATIENT
Start: 2017-01-01 | End: 2017-01-01

## 2017-01-01 RX ORDER — NITROGLYCERIN 5 MG/ML
INJECTION, SOLUTION INTRAVENOUS AS NEEDED
Status: DISCONTINUED | OUTPATIENT
Start: 2017-01-01 | End: 2017-01-01 | Stop reason: HOSPADM

## 2017-01-01 RX ORDER — POTASSIUM CHLORIDE 750 MG/1
20 CAPSULE, EXTENDED RELEASE ORAL 3 TIMES WEEKLY
Status: DISCONTINUED | OUTPATIENT
Start: 2017-01-01 | End: 2017-01-01 | Stop reason: HOSPADM

## 2017-01-01 RX ORDER — CLOPIDOGREL BISULFATE 75 MG/1
75 TABLET ORAL DAILY
Qty: 30 TABLET | Refills: 1 | Status: SHIPPED | OUTPATIENT
Start: 2017-01-01 | End: 2017-01-01 | Stop reason: SDUPTHER

## 2017-01-01 RX ORDER — BISACODYL 10 MG
10 SUPPOSITORY, RECTAL RECTAL DAILY PRN
Status: DISCONTINUED | OUTPATIENT
Start: 2017-01-01 | End: 2017-01-01 | Stop reason: HOSPADM

## 2017-01-01 RX ORDER — SODIUM CHLORIDE 0.9 % (FLUSH) 0.9 %
10 SYRINGE (ML) INJECTION AS NEEDED
Status: DISCONTINUED | OUTPATIENT
Start: 2017-01-01 | End: 2017-01-01 | Stop reason: HOSPADM

## 2017-01-01 RX ORDER — TRAMADOL HYDROCHLORIDE 50 MG/1
50 TABLET ORAL EVERY 6 HOURS PRN
Status: DISCONTINUED | OUTPATIENT
Start: 2017-01-01 | End: 2017-01-01 | Stop reason: HOSPADM

## 2017-01-01 RX ORDER — SOTALOL HYDROCHLORIDE 80 MG/1
160 TABLET ORAL EVERY 12 HOURS SCHEDULED
Status: DISCONTINUED | OUTPATIENT
Start: 2017-01-01 | End: 2017-01-01

## 2017-01-01 RX ORDER — GLYCOPYRROLATE 0.2 MG/ML
INJECTION INTRAMUSCULAR; INTRAVENOUS AS NEEDED
Status: DISCONTINUED | OUTPATIENT
Start: 2017-01-01 | End: 2017-01-01 | Stop reason: SURG

## 2017-01-01 RX ORDER — POTASSIUM CHLORIDE 1.5 G/1.77G
40 POWDER, FOR SOLUTION ORAL AS NEEDED
Status: DISCONTINUED | OUTPATIENT
Start: 2017-01-01 | End: 2017-01-01 | Stop reason: HOSPADM

## 2017-01-01 RX ORDER — POTASSIUM CHLORIDE 750 MG/1
40 CAPSULE, EXTENDED RELEASE ORAL AS NEEDED
Status: DISCONTINUED | OUTPATIENT
Start: 2017-01-01 | End: 2017-01-01 | Stop reason: HOSPADM

## 2017-01-01 RX ORDER — FUROSEMIDE 40 MG/1
40 TABLET ORAL 3 TIMES WEEKLY
Start: 2017-01-01 | End: 2017-01-01 | Stop reason: SDUPTHER

## 2017-01-01 RX ORDER — METOPROLOL SUCCINATE 50 MG/1
50 TABLET, EXTENDED RELEASE ORAL
Status: DISCONTINUED | OUTPATIENT
Start: 2017-01-01 | End: 2017-01-01 | Stop reason: HOSPADM

## 2017-01-01 RX ORDER — TAMSULOSIN HYDROCHLORIDE 0.4 MG/1
0.4 CAPSULE ORAL DAILY
Status: DISCONTINUED | OUTPATIENT
Start: 2017-01-01 | End: 2017-01-01 | Stop reason: HOSPADM

## 2017-01-01 RX ORDER — FUROSEMIDE 20 MG/1
20 TABLET ORAL DAILY
Status: DISCONTINUED | OUTPATIENT
Start: 2017-01-01 | End: 2017-01-01 | Stop reason: HOSPADM

## 2017-01-01 RX ORDER — OMEPRAZOLE 40 MG/1
40 CAPSULE, DELAYED RELEASE ORAL DAILY
COMMUNITY

## 2017-01-01 RX ORDER — ONDANSETRON 4 MG/1
4 TABLET, FILM COATED ORAL EVERY 6 HOURS PRN
Status: DISCONTINUED | OUTPATIENT
Start: 2017-01-01 | End: 2017-01-01 | Stop reason: HOSPADM

## 2017-01-01 RX ORDER — ACETAMINOPHEN 325 MG/1
650 TABLET ORAL EVERY 6 HOURS PRN
Status: DISCONTINUED | OUTPATIENT
Start: 2017-01-01 | End: 2017-01-01 | Stop reason: HOSPADM

## 2017-01-01 RX ORDER — SODIUM CHLORIDE 9 MG/ML
INJECTION, SOLUTION INTRAVENOUS CONTINUOUS PRN
Status: DISCONTINUED | OUTPATIENT
Start: 2017-01-01 | End: 2017-01-01 | Stop reason: SURG

## 2017-01-01 RX ORDER — ASPIRIN 325 MG
TABLET ORAL AS NEEDED
Status: DISCONTINUED | OUTPATIENT
Start: 2017-01-01 | End: 2017-01-01 | Stop reason: HOSPADM

## 2017-01-01 RX ORDER — DABIGATRAN ETEXILATE 150 MG/1
150 CAPSULE ORAL EVERY 12 HOURS
Status: DISCONTINUED | OUTPATIENT
Start: 2017-01-01 | End: 2017-01-01

## 2017-01-01 RX ORDER — FUROSEMIDE 40 MG/1
40 TABLET ORAL 2 TIMES DAILY
Qty: 180 TABLET | Refills: 3 | Status: SHIPPED | OUTPATIENT
Start: 2017-01-01 | End: 2017-01-01 | Stop reason: SDUPTHER

## 2017-01-01 RX ORDER — MORPHINE SULFATE 2 MG/ML
1 INJECTION, SOLUTION INTRAMUSCULAR; INTRAVENOUS EVERY 4 HOURS PRN
Status: DISCONTINUED | OUTPATIENT
Start: 2017-01-01 | End: 2017-01-01 | Stop reason: HOSPADM

## 2017-01-01 RX ORDER — PROMETHAZINE HYDROCHLORIDE 25 MG/ML
12.5 INJECTION, SOLUTION INTRAMUSCULAR; INTRAVENOUS ONCE AS NEEDED
Status: DISCONTINUED | OUTPATIENT
Start: 2017-01-01 | End: 2017-01-01 | Stop reason: HOSPADM

## 2017-01-01 RX ORDER — TRAMADOL HYDROCHLORIDE 50 MG/1
TABLET ORAL
Status: DISPENSED
Start: 2017-01-01 | End: 2017-01-01

## 2017-01-01 RX ORDER — FUROSEMIDE 10 MG/ML
40 INJECTION INTRAMUSCULAR; INTRAVENOUS ONCE
Status: COMPLETED | OUTPATIENT
Start: 2017-01-01 | End: 2017-01-01

## 2017-01-01 RX ORDER — PROMETHAZINE HYDROCHLORIDE 25 MG/1
25 TABLET ORAL ONCE AS NEEDED
Status: DISCONTINUED | OUTPATIENT
Start: 2017-01-01 | End: 2017-01-01 | Stop reason: HOSPADM

## 2017-01-01 RX ORDER — ONDANSETRON 2 MG/ML
4 INJECTION INTRAMUSCULAR; INTRAVENOUS EVERY 6 HOURS PRN
Status: DISCONTINUED | OUTPATIENT
Start: 2017-01-01 | End: 2017-01-01 | Stop reason: HOSPADM

## 2017-01-01 RX ORDER — WARFARIN SODIUM 2.5 MG/1
2.5 TABLET ORAL SEE ADMIN INSTRUCTIONS
COMMUNITY

## 2017-01-01 RX ORDER — POTASSIUM CHLORIDE 7.45 MG/ML
10 INJECTION INTRAVENOUS
Status: DISCONTINUED | OUTPATIENT
Start: 2017-01-01 | End: 2017-01-01 | Stop reason: HOSPADM

## 2017-01-01 RX ORDER — PREDNISONE 20 MG/1
TABLET ORAL
Qty: 7 TABLET | Refills: 0 | Status: SHIPPED | OUTPATIENT
Start: 2017-01-01 | End: 2017-01-01 | Stop reason: HOSPADM

## 2017-01-01 RX ORDER — TAMSULOSIN HYDROCHLORIDE 0.4 MG/1
2 CAPSULE ORAL NIGHTLY
Qty: 60 CAPSULE | Refills: 3 | Status: SHIPPED | OUTPATIENT
Start: 2017-01-01

## 2017-01-01 RX ORDER — PROMETHAZINE HYDROCHLORIDE 25 MG/1
12.5 TABLET ORAL ONCE AS NEEDED
Status: DISCONTINUED | OUTPATIENT
Start: 2017-01-01 | End: 2017-01-01 | Stop reason: HOSPADM

## 2017-01-01 RX ORDER — FUROSEMIDE 40 MG/1
40 TABLET ORAL
Status: DISCONTINUED | OUTPATIENT
Start: 2017-01-01 | End: 2017-01-01 | Stop reason: HOSPADM

## 2017-01-01 RX ORDER — FUROSEMIDE 10 MG/ML
40 INJECTION INTRAMUSCULAR; INTRAVENOUS EVERY 6 HOURS
Status: DISCONTINUED | OUTPATIENT
Start: 2017-01-01 | End: 2017-01-01

## 2017-01-01 RX ORDER — SOTALOL HYDROCHLORIDE 80 MG/1
80 TABLET ORAL 2 TIMES DAILY
Qty: 60 TABLET | Refills: 5 | OUTPATIENT
Start: 2017-01-01 | End: 2017-01-01 | Stop reason: HOSPADM

## 2017-01-01 RX ORDER — PRAVASTATIN SODIUM 40 MG
40 TABLET ORAL DAILY
Qty: 30 TABLET | Refills: 1 | Status: SHIPPED | OUTPATIENT
Start: 2017-01-01 | End: 2017-01-01

## 2017-01-01 RX ORDER — FUROSEMIDE 80 MG
80 TABLET ORAL DAILY
Start: 2017-01-01 | End: 2017-01-01

## 2017-01-01 RX ORDER — GUAIFENESIN AND CODEINE PHOSPHATE 100; 10 MG/5ML; MG/5ML
5 SOLUTION ORAL 3 TIMES DAILY PRN
Qty: 4 ML | Refills: 0 | OUTPATIENT
Start: 2017-01-01 | End: 2017-01-01

## 2017-01-01 RX ORDER — OMEPRAZOLE 40 MG/1
40 CAPSULE, DELAYED RELEASE ORAL
Status: DISCONTINUED | OUTPATIENT
Start: 2017-01-01 | End: 2017-01-01

## 2017-01-01 RX ORDER — HYDROCODONE BITARTRATE AND ACETAMINOPHEN 7.5; 325 MG/1; MG/1
1 TABLET ORAL EVERY 4 HOURS PRN
Status: DISCONTINUED | OUTPATIENT
Start: 2017-01-01 | End: 2017-01-01 | Stop reason: HOSPADM

## 2017-01-01 RX ORDER — BISACODYL 5 MG/1
5 TABLET, DELAYED RELEASE ORAL DAILY PRN
Status: DISCONTINUED | OUTPATIENT
Start: 2017-01-01 | End: 2017-01-01 | Stop reason: HOSPADM

## 2017-01-01 RX ORDER — FENTANYL CITRATE 50 UG/ML
INJECTION, SOLUTION INTRAMUSCULAR; INTRAVENOUS AS NEEDED
Status: DISCONTINUED | OUTPATIENT
Start: 2017-01-01 | End: 2017-01-01 | Stop reason: HOSPADM

## 2017-01-01 RX ORDER — DABIGATRAN ETEXILATE 150 MG/1
150 CAPSULE ORAL EVERY 12 HOURS
Qty: 180 CAPSULE | Refills: 1 | Status: SHIPPED | OUTPATIENT
Start: 2017-01-01 | End: 2017-01-01 | Stop reason: HOSPADM

## 2017-01-01 RX ORDER — FLUMAZENIL 0.1 MG/ML
0.2 INJECTION INTRAVENOUS AS NEEDED
Status: DISCONTINUED | OUTPATIENT
Start: 2017-01-01 | End: 2017-01-01 | Stop reason: HOSPADM

## 2017-01-01 RX ORDER — METOPROLOL SUCCINATE 50 MG/1
50 TABLET, EXTENDED RELEASE ORAL 2 TIMES DAILY
Qty: 120 TABLET | Refills: 2 | Status: SHIPPED | OUTPATIENT
Start: 2017-01-01 | End: 2017-01-01 | Stop reason: SDUPTHER

## 2017-01-01 RX ORDER — ASPIRIN 81 MG/1
81 TABLET ORAL NIGHTLY
Status: DISCONTINUED | OUTPATIENT
Start: 2017-01-01 | End: 2017-01-01 | Stop reason: HOSPADM

## 2017-01-01 RX ORDER — FUROSEMIDE 10 MG/ML
40 INJECTION INTRAMUSCULAR; INTRAVENOUS EVERY 6 HOURS
Status: COMPLETED | OUTPATIENT
Start: 2017-01-01 | End: 2017-01-01

## 2017-01-01 RX ORDER — METOPROLOL SUCCINATE 50 MG/1
50 TABLET, EXTENDED RELEASE ORAL EVERY 12 HOURS SCHEDULED
Status: DISCONTINUED | OUTPATIENT
Start: 2017-01-01 | End: 2017-01-01

## 2017-01-01 RX ORDER — DABIGATRAN ETEXILATE 75 MG/1
150 CAPSULE ORAL EVERY 12 HOURS SCHEDULED
Status: DISCONTINUED | OUTPATIENT
Start: 2017-01-01 | End: 2017-01-01

## 2017-01-01 RX ORDER — METOPROLOL SUCCINATE 50 MG/1
50 TABLET, EXTENDED RELEASE ORAL
Qty: 30 TABLET | Refills: 5 | OUTPATIENT
Start: 2017-01-01 | End: 2017-01-01 | Stop reason: SDUPTHER

## 2017-01-01 RX ORDER — HEPARIN SODIUM 1000 [USP'U]/ML
INJECTION, SOLUTION INTRAVENOUS; SUBCUTANEOUS AS NEEDED
Status: DISCONTINUED | OUTPATIENT
Start: 2017-01-01 | End: 2017-01-01 | Stop reason: HOSPADM

## 2017-01-01 RX ORDER — OMEPRAZOLE 40 MG/1
40 CAPSULE, DELAYED RELEASE ORAL DAILY
Qty: 90 CAPSULE | Refills: 1 | Status: SHIPPED | OUTPATIENT
Start: 2017-01-01 | End: 2017-01-01 | Stop reason: SDUPTHER

## 2017-01-01 RX ORDER — METOPROLOL SUCCINATE 25 MG/1
25 TABLET, EXTENDED RELEASE ORAL EVERY 12 HOURS SCHEDULED
Status: DISCONTINUED | OUTPATIENT
Start: 2017-01-01 | End: 2017-01-01

## 2017-01-01 RX ORDER — PRAVASTATIN SODIUM 40 MG
TABLET ORAL
Qty: 30 TABLET | Refills: 0 | Status: SHIPPED | OUTPATIENT
Start: 2017-01-01 | End: 2017-01-01 | Stop reason: SDUPTHER

## 2017-01-01 RX ORDER — WARFARIN SODIUM 5 MG/1
5 TABLET ORAL
Qty: 30 TABLET | Refills: 1 | Status: SHIPPED | OUTPATIENT
Start: 2017-01-01 | End: 2017-01-01 | Stop reason: SDUPTHER

## 2017-01-01 RX ORDER — SODIUM CHLORIDE 9 MG/ML
INJECTION, SOLUTION INTRAVENOUS CONTINUOUS PRN
Status: DISCONTINUED | OUTPATIENT
Start: 2017-01-01 | End: 2017-01-01 | Stop reason: HOSPADM

## 2017-01-01 RX ORDER — WARFARIN SODIUM 5 MG/1
5 TABLET ORAL
Status: COMPLETED | OUTPATIENT
Start: 2017-01-01 | End: 2017-01-01

## 2017-01-01 RX ORDER — SODIUM CHLORIDE 0.9 % (FLUSH) 0.9 %
1-10 SYRINGE (ML) INJECTION AS NEEDED
Status: DISCONTINUED | OUTPATIENT
Start: 2017-01-01 | End: 2017-01-01 | Stop reason: HOSPADM

## 2017-01-01 RX ORDER — FAMOTIDINE 10 MG/ML
20 INJECTION, SOLUTION INTRAVENOUS ONCE
Status: COMPLETED | OUTPATIENT
Start: 2017-01-01 | End: 2017-01-01

## 2017-01-01 RX ORDER — CEFUROXIME AXETIL 250 MG/1
250 TABLET ORAL 2 TIMES DAILY
Qty: 20 TABLET | Refills: 0 | Status: SHIPPED | OUTPATIENT
Start: 2017-01-01 | End: 2017-01-01

## 2017-01-01 RX ORDER — TADALAFIL 20 MG/1
40 TABLET ORAL
Status: DISCONTINUED | OUTPATIENT
Start: 2017-01-01 | End: 2017-01-01 | Stop reason: HOSPADM

## 2017-01-01 RX ORDER — OMEPRAZOLE 40 MG/1
40 CAPSULE, DELAYED RELEASE ORAL DAILY
Status: DISCONTINUED | OUTPATIENT
Start: 2017-01-01 | End: 2017-01-01

## 2017-01-01 RX ORDER — ATORVASTATIN CALCIUM 20 MG/1
20 TABLET, FILM COATED ORAL DAILY
Status: DISCONTINUED | OUTPATIENT
Start: 2017-01-01 | End: 2017-01-01

## 2017-01-01 RX ORDER — WARFARIN SODIUM 5 MG/1
5 TABLET ORAL
Status: DISCONTINUED | OUTPATIENT
Start: 2017-01-01 | End: 2017-01-01 | Stop reason: HOSPADM

## 2017-01-01 RX ORDER — ALLOPURINOL 300 MG/1
300 TABLET ORAL NIGHTLY PRN
Status: DISCONTINUED | OUTPATIENT
Start: 2017-01-01 | End: 2017-01-01 | Stop reason: HOSPADM

## 2017-01-01 RX ORDER — OMEPRAZOLE 40 MG/1
CAPSULE, DELAYED RELEASE ORAL
Qty: 90 CAPSULE | Refills: 1 | Status: SHIPPED | OUTPATIENT
Start: 2017-01-01 | End: 2017-01-01 | Stop reason: SDUPTHER

## 2017-01-01 RX ORDER — PREDNISONE 20 MG/1
20 TABLET ORAL
Status: DISCONTINUED | OUTPATIENT
Start: 2017-01-01 | End: 2017-01-01 | Stop reason: HOSPADM

## 2017-01-01 RX ORDER — SODIUM CHLORIDE 9 MG/ML
125 INJECTION, SOLUTION INTRAVENOUS CONTINUOUS
Status: DISCONTINUED | OUTPATIENT
Start: 2017-01-01 | End: 2017-01-01

## 2017-01-01 RX ORDER — COLCHICINE 0.6 MG/1
0.6 CAPSULE ORAL DAILY
Qty: 30 CAPSULE | Refills: 0 | Status: SHIPPED | OUTPATIENT
Start: 2017-01-01 | End: 2017-01-01

## 2017-01-01 RX ORDER — PREDNISONE 20 MG/1
40 TABLET ORAL DAILY
Status: DISCONTINUED | OUTPATIENT
Start: 2017-01-01 | End: 2017-01-01 | Stop reason: HOSPADM

## 2017-01-01 RX ORDER — DIPHENHYDRAMINE HYDROCHLORIDE 50 MG/ML
12.5 INJECTION INTRAMUSCULAR; INTRAVENOUS
Status: DISCONTINUED | OUTPATIENT
Start: 2017-01-01 | End: 2017-01-01 | Stop reason: HOSPADM

## 2017-01-01 RX ORDER — ONDANSETRON 2 MG/ML
4 INJECTION INTRAMUSCULAR; INTRAVENOUS ONCE AS NEEDED
Status: DISCONTINUED | OUTPATIENT
Start: 2017-01-01 | End: 2017-01-01 | Stop reason: HOSPADM

## 2017-01-01 RX ORDER — FENTANYL CITRATE 50 UG/ML
50 INJECTION, SOLUTION INTRAMUSCULAR; INTRAVENOUS
Status: DISCONTINUED | OUTPATIENT
Start: 2017-01-01 | End: 2017-01-01 | Stop reason: HOSPADM

## 2017-01-01 RX ORDER — METHYLPREDNISOLONE SODIUM SUCCINATE 125 MG/2ML
125 INJECTION, POWDER, LYOPHILIZED, FOR SOLUTION INTRAMUSCULAR; INTRAVENOUS ONCE
Status: COMPLETED | OUTPATIENT
Start: 2017-01-01 | End: 2017-01-01

## 2017-01-01 RX ORDER — FUROSEMIDE 20 MG/1
20 TABLET ORAL DAILY
Qty: 30 TABLET | Refills: 1
Start: 2017-01-01 | End: 2017-01-01

## 2017-01-01 RX ORDER — HYDROCODONE BITARTRATE AND ACETAMINOPHEN 7.5; 325 MG/1; MG/1
1 TABLET ORAL ONCE
Status: COMPLETED | OUTPATIENT
Start: 2017-01-01 | End: 2017-01-01

## 2017-01-01 RX ORDER — LIDOCAINE HYDROCHLORIDE 20 MG/ML
INJECTION, SOLUTION INFILTRATION; PERINEURAL AS NEEDED
Status: DISCONTINUED | OUTPATIENT
Start: 2017-01-01 | End: 2017-01-01 | Stop reason: SURG

## 2017-01-01 RX ORDER — VERAPAMIL HYDROCHLORIDE 2.5 MG/ML
INJECTION, SOLUTION INTRAVENOUS AS NEEDED
Status: DISCONTINUED | OUTPATIENT
Start: 2017-01-01 | End: 2017-01-01 | Stop reason: HOSPADM

## 2017-01-01 RX ORDER — DABIGATRAN ETEXILATE 150 MG/1
150 CAPSULE ORAL EVERY 12 HOURS
Qty: 60 CAPSULE | Refills: 5 | Status: SHIPPED | OUTPATIENT
Start: 2017-01-01 | End: 2017-01-01 | Stop reason: SDUPTHER

## 2017-01-01 RX ORDER — HYDRALAZINE HYDROCHLORIDE 20 MG/ML
5 INJECTION INTRAMUSCULAR; INTRAVENOUS
Status: DISCONTINUED | OUTPATIENT
Start: 2017-01-01 | End: 2017-01-01 | Stop reason: HOSPADM

## 2017-01-01 RX ORDER — NALOXONE HCL 0.4 MG/ML
0.4 VIAL (ML) INJECTION
Status: DISCONTINUED | OUTPATIENT
Start: 2017-01-01 | End: 2017-01-01 | Stop reason: HOSPADM

## 2017-01-01 RX ORDER — ASPIRIN 81 MG/1
81 TABLET ORAL DAILY
COMMUNITY

## 2017-01-01 RX ORDER — SILDENAFIL CITRATE 20 MG/1
20 TABLET ORAL EVERY 8 HOURS SCHEDULED
Status: DISCONTINUED | OUTPATIENT
Start: 2017-01-01 | End: 2017-01-01 | Stop reason: HOSPADM

## 2017-01-01 RX ORDER — ONDANSETRON 2 MG/ML
INJECTION INTRAMUSCULAR; INTRAVENOUS AS NEEDED
Status: DISCONTINUED | OUTPATIENT
Start: 2017-01-01 | End: 2017-01-01 | Stop reason: SURG

## 2017-01-01 RX ORDER — AMLODIPINE BESYLATE 5 MG/1
5 TABLET ORAL DAILY
Status: DISCONTINUED | OUTPATIENT
Start: 2017-01-01 | End: 2017-01-01 | Stop reason: HOSPADM

## 2017-01-01 RX ORDER — ESMOLOL HYDROCHLORIDE 10 MG/ML
50-300 INJECTION, SOLUTION INTRAVENOUS CONTINUOUS
Status: DISCONTINUED | OUTPATIENT
Start: 2017-01-01 | End: 2017-01-01

## 2017-01-01 RX ORDER — SOTALOL HYDROCHLORIDE 80 MG/1
160 TABLET ORAL EVERY 12 HOURS SCHEDULED
Status: DISCONTINUED | OUTPATIENT
Start: 2017-01-01 | End: 2017-01-01 | Stop reason: HOSPADM

## 2017-01-01 RX ORDER — ECHINACEA PURPUREA EXTRACT 125 MG
2 TABLET ORAL AS NEEDED
Status: DISCONTINUED | OUTPATIENT
Start: 2017-01-01 | End: 2017-01-01 | Stop reason: HOSPADM

## 2017-01-01 RX ORDER — DIPHENHYDRAMINE HYDROCHLORIDE 50 MG/ML
25 INJECTION INTRAMUSCULAR; INTRAVENOUS ONCE
Status: COMPLETED | OUTPATIENT
Start: 2017-01-01 | End: 2017-01-01

## 2017-01-01 RX ORDER — CLOPIDOGREL BISULFATE 75 MG/1
75 TABLET ORAL DAILY
Qty: 30 TABLET | Refills: 5 | Status: SHIPPED | OUTPATIENT
Start: 2017-01-01 | End: 2017-01-01

## 2017-01-01 RX ORDER — TRAMADOL HYDROCHLORIDE 50 MG/1
50 TABLET ORAL EVERY 6 HOURS PRN
Qty: 28 TABLET | Refills: 0 | Status: SHIPPED | OUTPATIENT
Start: 2017-01-01

## 2017-01-01 RX ORDER — ASPIRIN 81 MG/1
162 TABLET, CHEWABLE ORAL DAILY
Status: DISCONTINUED | OUTPATIENT
Start: 2017-01-01 | End: 2017-01-01 | Stop reason: HOSPADM

## 2017-01-01 RX ORDER — PRAVASTATIN SODIUM 40 MG
40 TABLET ORAL NIGHTLY
Status: DISCONTINUED | OUTPATIENT
Start: 2017-01-01 | End: 2017-01-01 | Stop reason: HOSPADM

## 2017-01-01 RX ORDER — FUROSEMIDE 40 MG/1
40 TABLET ORAL
COMMUNITY

## 2017-01-01 RX ORDER — ASPIRIN 81 MG/1
81 TABLET ORAL DAILY
COMMUNITY
End: 2017-01-01

## 2017-01-01 RX ORDER — TAMSULOSIN HYDROCHLORIDE 0.4 MG/1
0.4 CAPSULE ORAL NIGHTLY
Status: DISCONTINUED | OUTPATIENT
Start: 2017-01-01 | End: 2017-01-01 | Stop reason: HOSPADM

## 2017-01-01 RX ORDER — NITROGLYCERIN 0.4 MG/1
TABLET SUBLINGUAL
Qty: 100 TABLET | Refills: 1 | Status: SHIPPED | OUTPATIENT
Start: 2017-01-01 | End: 2017-01-01 | Stop reason: SDUPTHER

## 2017-01-01 RX ORDER — CICLOPIROX 1 G/100ML
SHAMPOO TOPICAL
COMMUNITY
Start: 2017-01-01 | End: 2017-01-01

## 2017-01-01 RX ORDER — NITROGLYCERIN 0.4 MG/1
TABLET SUBLINGUAL
Qty: 100 TABLET | Refills: 1 | Status: SHIPPED | OUTPATIENT
Start: 2017-01-01 | End: 2017-01-01

## 2017-01-01 RX ORDER — ASPIRIN 325 MG
325 TABLET ORAL ONCE
Status: DISCONTINUED | OUTPATIENT
Start: 2017-01-01 | End: 2017-01-01

## 2017-01-01 RX ORDER — NITROGLYCERIN 0.4 MG/1
0.4 TABLET SUBLINGUAL
Status: DISCONTINUED | OUTPATIENT
Start: 2017-01-01 | End: 2017-01-01 | Stop reason: HOSPADM

## 2017-01-01 RX ORDER — OXYCODONE AND ACETAMINOPHEN 7.5; 325 MG/1; MG/1
1 TABLET ORAL ONCE AS NEEDED
Status: DISCONTINUED | OUTPATIENT
Start: 2017-01-01 | End: 2017-01-01 | Stop reason: HOSPADM

## 2017-01-01 RX ORDER — ACETAMINOPHEN 325 MG/1
650 TABLET ORAL EVERY 4 HOURS PRN
Status: DISCONTINUED | OUTPATIENT
Start: 2017-01-01 | End: 2017-01-01 | Stop reason: HOSPADM

## 2017-01-01 RX ORDER — METOPROLOL SUCCINATE 50 MG/1
50 TABLET, EXTENDED RELEASE ORAL DAILY
Qty: 120 TABLET | Refills: 2 | COMMUNITY
Start: 2017-01-01 | End: 2017-01-01

## 2017-01-01 RX ORDER — ONDANSETRON 2 MG/ML
4 INJECTION INTRAMUSCULAR; INTRAVENOUS ONCE
Status: COMPLETED | OUTPATIENT
Start: 2017-01-01 | End: 2017-01-01

## 2017-01-01 RX ORDER — PANTOPRAZOLE SODIUM 40 MG/1
40 TABLET, DELAYED RELEASE ORAL EVERY MORNING
Status: DISCONTINUED | OUTPATIENT
Start: 2017-01-01 | End: 2017-01-01 | Stop reason: HOSPADM

## 2017-01-01 RX ORDER — FUROSEMIDE 20 MG/1
20 TABLET ORAL 2 TIMES DAILY
Qty: 180 TABLET | Refills: 3
Start: 2017-01-01 | End: 2017-01-01

## 2017-01-01 RX ORDER — FUROSEMIDE 40 MG/1
40 TABLET ORAL TAKE AS DIRECTED
Start: 2017-01-01 | End: 2017-01-01

## 2017-01-01 RX ORDER — PROMETHAZINE HYDROCHLORIDE 25 MG/1
25 SUPPOSITORY RECTAL ONCE AS NEEDED
Status: DISCONTINUED | OUTPATIENT
Start: 2017-01-01 | End: 2017-01-01 | Stop reason: HOSPADM

## 2017-01-01 RX ORDER — FUROSEMIDE 80 MG
80 TABLET ORAL DAILY
Status: DISCONTINUED | OUTPATIENT
Start: 2017-01-01 | End: 2017-01-01

## 2017-01-01 RX ORDER — DABIGATRAN ETEXILATE 150 MG/1
150 CAPSULE ORAL EVERY 12 HOURS
Status: DISCONTINUED | OUTPATIENT
Start: 2017-01-01 | End: 2017-01-01 | Stop reason: HOSPADM

## 2017-01-01 RX ORDER — FUROSEMIDE 10 MG/ML
INJECTION INTRAMUSCULAR; INTRAVENOUS AS NEEDED
Status: DISCONTINUED | OUTPATIENT
Start: 2017-01-01 | End: 2017-01-01 | Stop reason: HOSPADM

## 2017-01-01 RX ORDER — NITROGLYCERIN 0.4 MG/1
0.4 TABLET SUBLINGUAL
Status: DISCONTINUED | OUTPATIENT
Start: 2017-01-01 | End: 2017-01-01

## 2017-01-01 RX ORDER — ALLOPURINOL 300 MG/1
300 TABLET ORAL NIGHTLY
Status: DISCONTINUED | OUTPATIENT
Start: 2017-01-01 | End: 2017-01-01 | Stop reason: HOSPADM

## 2017-01-01 RX ORDER — DIPHENHYDRAMINE HYDROCHLORIDE 50 MG/ML
50 INJECTION INTRAMUSCULAR; INTRAVENOUS ONCE
Status: COMPLETED | OUTPATIENT
Start: 2017-01-01 | End: 2017-01-01

## 2017-01-01 RX ORDER — ONDANSETRON 4 MG/1
4 TABLET, ORALLY DISINTEGRATING ORAL EVERY 6 HOURS PRN
Status: DISCONTINUED | OUTPATIENT
Start: 2017-01-01 | End: 2017-01-01 | Stop reason: HOSPADM

## 2017-01-01 RX ORDER — ROCURONIUM BROMIDE 10 MG/ML
INJECTION, SOLUTION INTRAVENOUS AS NEEDED
Status: DISCONTINUED | OUTPATIENT
Start: 2017-01-01 | End: 2017-01-01 | Stop reason: SURG

## 2017-01-01 RX ORDER — ATROPINE SULFATE 1 MG/ML
INJECTION, SOLUTION INTRAMUSCULAR; INTRAVENOUS; SUBCUTANEOUS
Status: COMPLETED | OUTPATIENT
Start: 2017-01-01 | End: 2017-01-01

## 2017-01-01 RX ORDER — TAMSULOSIN HYDROCHLORIDE 0.4 MG/1
1 CAPSULE ORAL 2 TIMES DAILY
Qty: 60 CAPSULE | Refills: 5 | Status: SHIPPED | OUTPATIENT
Start: 2017-01-01 | End: 2017-01-01 | Stop reason: SDUPTHER

## 2017-01-01 RX ORDER — SODIUM CHLORIDE 9 MG/ML
100 INJECTION, SOLUTION INTRAVENOUS CONTINUOUS
Status: ACTIVE | OUTPATIENT
Start: 2017-01-01 | End: 2017-01-01

## 2017-01-01 RX ORDER — WARFARIN SODIUM 5 MG/1
5 TABLET ORAL SEE ADMIN INSTRUCTIONS
COMMUNITY

## 2017-01-01 RX ORDER — NITROGLYCERIN 0.4 MG/1
TABLET SUBLINGUAL
Qty: 25 TABLET | Refills: 5 | Status: SHIPPED | OUTPATIENT
Start: 2017-01-01 | End: 2017-01-01 | Stop reason: SDUPTHER

## 2017-01-01 RX ORDER — OMEPRAZOLE 40 MG/1
40 CAPSULE, DELAYED RELEASE ORAL DAILY
Status: DISCONTINUED | OUTPATIENT
Start: 2017-01-01 | End: 2017-01-01 | Stop reason: CLARIF

## 2017-01-01 RX ORDER — SILDENAFIL CITRATE 20 MG/1
20 TABLET ORAL EVERY 8 HOURS SCHEDULED
Qty: 90 TABLET | Refills: 1 | Status: SHIPPED | OUTPATIENT
Start: 2017-01-01 | End: 2017-01-01

## 2017-01-01 RX ORDER — WARFARIN SODIUM 2.5 MG/1
2.5 TABLET ORAL
Status: DISCONTINUED | OUTPATIENT
Start: 2017-01-01 | End: 2017-01-01

## 2017-01-01 RX ORDER — LEVOFLOXACIN 500 MG/1
500 TABLET, FILM COATED ORAL DAILY
Qty: 10 TABLET | Refills: 0 | Status: SHIPPED | OUTPATIENT
Start: 2017-01-01 | End: 2017-01-01

## 2017-01-01 RX ORDER — METHYLPREDNISOLONE 4 MG/1
TABLET ORAL
Qty: 21 TABLET | Refills: 0 | Status: SHIPPED | OUTPATIENT
Start: 2017-01-01 | End: 2017-01-01

## 2017-01-01 RX ORDER — LABETALOL HYDROCHLORIDE 5 MG/ML
5 INJECTION, SOLUTION INTRAVENOUS
Status: DISCONTINUED | OUTPATIENT
Start: 2017-01-01 | End: 2017-01-01 | Stop reason: HOSPADM

## 2017-01-01 RX ORDER — POTASSIUM CHLORIDE 750 MG/1
20 CAPSULE, EXTENDED RELEASE ORAL
Status: DISCONTINUED | OUTPATIENT
Start: 2017-01-01 | End: 2017-01-01 | Stop reason: HOSPADM

## 2017-01-01 RX ORDER — MIDAZOLAM HYDROCHLORIDE 1 MG/ML
2 INJECTION INTRAMUSCULAR; INTRAVENOUS
Status: DISCONTINUED | OUTPATIENT
Start: 2017-01-01 | End: 2017-01-01 | Stop reason: HOSPADM

## 2017-01-01 RX ORDER — TRAMADOL HYDROCHLORIDE 50 MG/1
50 TABLET ORAL ONCE
Status: COMPLETED | OUTPATIENT
Start: 2017-01-01 | End: 2017-01-01

## 2017-01-01 RX ORDER — MIDAZOLAM HYDROCHLORIDE 1 MG/ML
1 INJECTION INTRAMUSCULAR; INTRAVENOUS
Status: DISCONTINUED | OUTPATIENT
Start: 2017-01-01 | End: 2017-01-01 | Stop reason: HOSPADM

## 2017-01-01 RX ORDER — POTASSIUM CHLORIDE 1.5 G/1.77G
40 POWDER, FOR SOLUTION ORAL AS NEEDED
Status: DISCONTINUED | OUTPATIENT
Start: 2017-01-01 | End: 2017-01-01 | Stop reason: CLARIF

## 2017-01-01 RX ORDER — PREDNISONE 20 MG/1
20 TABLET ORAL DAILY
Status: DISCONTINUED | OUTPATIENT
Start: 2017-01-01 | End: 2017-01-01 | Stop reason: HOSPADM

## 2017-01-01 RX ORDER — POTASSIUM CHLORIDE 20 MEQ/1
20 TABLET, EXTENDED RELEASE ORAL 3 TIMES WEEKLY
Status: DISCONTINUED | OUTPATIENT
Start: 2017-01-01 | End: 2017-01-01

## 2017-01-01 RX ORDER — ISOSORBIDE MONONITRATE 30 MG/1
30 TABLET, EXTENDED RELEASE ORAL DAILY
Qty: 30 TABLET | Refills: 0 | Status: SHIPPED | OUTPATIENT
Start: 2017-01-01

## 2017-01-01 RX ORDER — FUROSEMIDE 40 MG/1
40 TABLET ORAL DAILY
Qty: 30 TABLET | Refills: 5 | Status: SHIPPED | OUTPATIENT
Start: 2017-01-01 | End: 2017-01-01 | Stop reason: SDUPTHER

## 2017-01-01 RX ORDER — ASPIRIN 81 MG/1
162 TABLET, CHEWABLE ORAL DAILY
Qty: 60 TABLET | Refills: 1 | Status: SHIPPED | OUTPATIENT
Start: 2017-01-01 | End: 2017-01-01 | Stop reason: SDUPTHER

## 2017-01-01 RX ORDER — LIDOCAINE HYDROCHLORIDE AND EPINEPHRINE 10; 10 MG/ML; UG/ML
INJECTION, SOLUTION INFILTRATION; PERINEURAL AS NEEDED
Status: DISCONTINUED | OUTPATIENT
Start: 2017-01-01 | End: 2017-01-01 | Stop reason: HOSPADM

## 2017-01-01 RX ORDER — FUROSEMIDE 40 MG/1
40 TABLET ORAL 2 TIMES DAILY
Status: DISCONTINUED | OUTPATIENT
Start: 2017-01-01 | End: 2017-01-01

## 2017-01-01 RX ORDER — METOPROLOL SUCCINATE 25 MG/1
12.5 TABLET, EXTENDED RELEASE ORAL EVERY 12 HOURS SCHEDULED
Status: DISCONTINUED | OUTPATIENT
Start: 2017-01-01 | End: 2017-01-01 | Stop reason: HOSPADM

## 2017-01-01 RX ORDER — TAMSULOSIN HYDROCHLORIDE 0.4 MG/1
0.8 CAPSULE ORAL NIGHTLY
Status: DISCONTINUED | OUTPATIENT
Start: 2017-01-01 | End: 2017-01-01 | Stop reason: HOSPADM

## 2017-01-01 RX ORDER — TEMAZEPAM 7.5 MG/1
7.5 CAPSULE ORAL NIGHTLY PRN
Status: DISCONTINUED | OUTPATIENT
Start: 2017-01-01 | End: 2017-01-01 | Stop reason: HOSPADM

## 2017-01-01 RX ORDER — POTASSIUM CHLORIDE 20 MEQ/1
20 TABLET, EXTENDED RELEASE ORAL DAILY
Qty: 30 TABLET | Refills: 2 | Status: SHIPPED | OUTPATIENT
Start: 2017-01-01

## 2017-01-01 RX ORDER — HYDROCODONE BITARTRATE AND ACETAMINOPHEN 7.5; 325 MG/1; MG/1
1 TABLET ORAL ONCE AS NEEDED
Status: DISCONTINUED | OUTPATIENT
Start: 2017-01-01 | End: 2017-01-01 | Stop reason: HOSPADM

## 2017-01-01 RX ORDER — SOTALOL HYDROCHLORIDE 80 MG/1
80 TABLET ORAL EVERY 12 HOURS SCHEDULED
Status: DISCONTINUED | OUTPATIENT
Start: 2017-01-01 | End: 2017-01-01 | Stop reason: HOSPADM

## 2017-01-01 RX ORDER — METOPROLOL SUCCINATE 50 MG/1
50 TABLET, EXTENDED RELEASE ORAL DAILY
Status: DISCONTINUED | OUTPATIENT
Start: 2017-01-01 | End: 2017-01-01 | Stop reason: HOSPADM

## 2017-01-01 RX ORDER — FAMOTIDINE 20 MG/1
20 TABLET, FILM COATED ORAL 2 TIMES DAILY
Qty: 20 TABLET | Refills: 0 | Status: SHIPPED | OUTPATIENT
Start: 2017-01-01 | End: 2017-01-01

## 2017-01-01 RX ORDER — HYDROMORPHONE HYDROCHLORIDE 1 MG/ML
0.5 INJECTION, SOLUTION INTRAMUSCULAR; INTRAVENOUS; SUBCUTANEOUS
Status: DISCONTINUED | OUTPATIENT
Start: 2017-01-01 | End: 2017-01-01 | Stop reason: HOSPADM

## 2017-01-01 RX ORDER — TAMSULOSIN HYDROCHLORIDE 0.4 MG/1
1 CAPSULE ORAL NIGHTLY
COMMUNITY
End: 2017-01-01 | Stop reason: SDUPTHER

## 2017-01-01 RX ORDER — ECHINACEA PURPUREA EXTRACT 125 MG
2 TABLET ORAL 3 TIMES DAILY PRN
Qty: 104 ML | Refills: 0 | Status: SHIPPED | OUTPATIENT
Start: 2017-01-01 | End: 2017-01-01

## 2017-01-01 RX ORDER — POTASSIUM CHLORIDE 1.5 G/1.77G
10 POWDER, FOR SOLUTION ORAL DAILY
Qty: 30 PACKET | Refills: 5 | OUTPATIENT
Start: 2017-01-01 | End: 2017-01-01 | Stop reason: HOSPADM

## 2017-01-01 RX ORDER — FUROSEMIDE 20 MG/1
20 TABLET ORAL DAILY
Qty: 30 TABLET | Refills: 1 | Status: CANCELLED
Start: 2017-01-01

## 2017-01-01 RX ORDER — SODIUM CHLORIDE, SODIUM LACTATE, POTASSIUM CHLORIDE, CALCIUM CHLORIDE 600; 310; 30; 20 MG/100ML; MG/100ML; MG/100ML; MG/100ML
9 INJECTION, SOLUTION INTRAVENOUS CONTINUOUS
Status: DISCONTINUED | OUTPATIENT
Start: 2017-01-01 | End: 2017-01-01 | Stop reason: HOSPADM

## 2017-01-01 RX ORDER — CICLOPIROX OLAMINE 7.7 MG/100ML
SUSPENSION TOPICAL
COMMUNITY
Start: 2017-01-01 | End: 2017-01-01 | Stop reason: SDUPTHER

## 2017-01-01 RX ORDER — AMLODIPINE BESYLATE 5 MG/1
5 TABLET ORAL DAILY
Status: DISCONTINUED | OUTPATIENT
Start: 2017-01-01 | End: 2017-01-01

## 2017-01-01 RX ORDER — FUROSEMIDE 40 MG/1
80 TABLET ORAL 3 TIMES DAILY
COMMUNITY

## 2017-01-01 RX ORDER — CLOPIDOGREL BISULFATE 75 MG/1
TABLET ORAL AS NEEDED
Status: DISCONTINUED | OUTPATIENT
Start: 2017-01-01 | End: 2017-01-01 | Stop reason: HOSPADM

## 2017-01-01 RX ORDER — DILTIAZEM HYDROCHLORIDE 5 MG/ML
10 INJECTION INTRAVENOUS ONCE
Status: COMPLETED | OUTPATIENT
Start: 2017-01-01 | End: 2017-01-01

## 2017-01-01 RX ORDER — FUROSEMIDE 80 MG
80 TABLET ORAL 2 TIMES DAILY
Status: DISCONTINUED | OUTPATIENT
Start: 2017-01-01 | End: 2017-01-01

## 2017-01-01 RX ORDER — KIT FOR THE PREPARATION OF TECHNETIUM TC 99M MEBROFENIN 45 MG/10ML
1 INJECTION, POWDER, LYOPHILIZED, FOR SOLUTION INTRAVENOUS
Status: COMPLETED | OUTPATIENT
Start: 2017-01-01 | End: 2017-01-01

## 2017-01-01 RX ORDER — FUROSEMIDE 40 MG/1
40 TABLET ORAL 3 TIMES WEEKLY
Status: DISCONTINUED | OUTPATIENT
Start: 2017-01-01 | End: 2017-01-01

## 2017-01-01 RX ORDER — POTASSIUM CHLORIDE 750 MG/1
20 CAPSULE, EXTENDED RELEASE ORAL DAILY
Status: DISCONTINUED | OUTPATIENT
Start: 2017-01-01 | End: 2017-01-01 | Stop reason: HOSPADM

## 2017-01-01 RX ORDER — METOPROLOL SUCCINATE 50 MG/1
50 TABLET, EXTENDED RELEASE ORAL 2 TIMES DAILY
COMMUNITY

## 2017-01-01 RX ORDER — FUROSEMIDE 40 MG/1
40 TABLET ORAL DAILY
Qty: 90 TABLET | Refills: 1 | Status: ON HOLD | OUTPATIENT
Start: 2017-01-01 | End: 2017-01-01

## 2017-01-01 RX ORDER — METOPROLOL SUCCINATE 50 MG/1
TABLET, EXTENDED RELEASE ORAL
Qty: 30 TABLET | Refills: 2 | Status: SHIPPED | OUTPATIENT
Start: 2017-01-01 | End: 2017-01-01 | Stop reason: SDUPTHER

## 2017-01-01 RX ORDER — TAMSULOSIN HYDROCHLORIDE 0.4 MG/1
1 CAPSULE ORAL 2 TIMES DAILY
Qty: 180 CAPSULE | Refills: 1 | Status: SHIPPED | OUTPATIENT
Start: 2017-01-01 | End: 2017-01-01

## 2017-01-01 RX ORDER — UBIDECARENONE 100 MG
200 CAPSULE ORAL DAILY
COMMUNITY

## 2017-01-01 RX ORDER — DABIGATRAN ETEXILATE 150 MG/1
150 CAPSULE ORAL EVERY 12 HOURS SCHEDULED
Status: DISCONTINUED | OUTPATIENT
Start: 2017-01-01 | End: 2017-01-01 | Stop reason: HOSPADM

## 2017-01-01 RX ORDER — WARFARIN SODIUM 4 MG/1
4 TABLET ORAL
Status: COMPLETED | OUTPATIENT
Start: 2017-01-01 | End: 2017-01-01

## 2017-01-01 RX ORDER — POTASSIUM CHLORIDE 1.5 G/1.77G
10 POWDER, FOR SOLUTION ORAL DAILY
Status: DISCONTINUED | OUTPATIENT
Start: 2017-01-01 | End: 2017-01-01 | Stop reason: HOSPADM

## 2017-01-01 RX ORDER — NEOSTIGMINE METHYLSULFATE 1 MG/ML
INJECTION, SOLUTION INTRAVENOUS AS NEEDED
Status: DISCONTINUED | OUTPATIENT
Start: 2017-01-01 | End: 2017-01-01 | Stop reason: SURG

## 2017-01-01 RX ORDER — METOPROLOL TARTRATE 50 MG/1
50 TABLET, FILM COATED ORAL ONCE
Status: COMPLETED | OUTPATIENT
Start: 2017-01-01 | End: 2017-01-01

## 2017-01-01 RX ORDER — NITROGLYCERIN 0.4 MG/1
0.4 TABLET SUBLINGUAL
COMMUNITY

## 2017-01-01 RX ORDER — NALOXONE HCL 0.4 MG/ML
0.2 VIAL (ML) INJECTION AS NEEDED
Status: DISCONTINUED | OUTPATIENT
Start: 2017-01-01 | End: 2017-01-01 | Stop reason: HOSPADM

## 2017-01-01 RX ORDER — ASPIRIN 325 MG
325 TABLET ORAL ONCE
Status: COMPLETED | OUTPATIENT
Start: 2017-01-01 | End: 2017-01-01

## 2017-01-01 RX ORDER — PROPOFOL 10 MG/ML
VIAL (ML) INTRAVENOUS AS NEEDED
Status: DISCONTINUED | OUTPATIENT
Start: 2017-01-01 | End: 2017-01-01 | Stop reason: SURG

## 2017-01-01 RX ORDER — SPIRONOLACTONE 25 MG/1
25 TABLET ORAL DAILY
Status: DISCONTINUED | OUTPATIENT
Start: 2017-01-01 | End: 2017-01-01

## 2017-01-01 RX ORDER — FUROSEMIDE 40 MG/1
40 TABLET ORAL DAILY
Status: DISCONTINUED | OUTPATIENT
Start: 2017-01-01 | End: 2017-01-01

## 2017-01-01 RX ORDER — AMLODIPINE BESYLATE 5 MG/1
5 TABLET ORAL DAILY
Qty: 90 TABLET | Refills: 3 | Status: SHIPPED | OUTPATIENT
Start: 2017-01-01 | End: 2017-01-01 | Stop reason: HOSPADM

## 2017-01-01 RX ORDER — HYDROCODONE BITARTRATE AND ACETAMINOPHEN 5; 325 MG/1; MG/1
1 TABLET ORAL EVERY 4 HOURS PRN
Status: DISCONTINUED | OUTPATIENT
Start: 2017-01-01 | End: 2017-01-01 | Stop reason: HOSPADM

## 2017-01-01 RX ORDER — POTASSIUM CHLORIDE 20 MEQ/1
20 TABLET, EXTENDED RELEASE ORAL 3 TIMES WEEKLY
Status: DISCONTINUED | OUTPATIENT
Start: 2017-01-01 | End: 2017-01-01 | Stop reason: CLARIF

## 2017-01-01 RX ORDER — FUROSEMIDE 40 MG/1
40 TABLET ORAL NIGHTLY
Status: DISCONTINUED | OUTPATIENT
Start: 2017-01-01 | End: 2017-01-01 | Stop reason: HOSPADM

## 2017-01-01 RX ORDER — SOTALOL HYDROCHLORIDE 160 MG/1
160 TABLET ORAL EVERY 12 HOURS SCHEDULED
Qty: 60 TABLET | Refills: 5 | OUTPATIENT
Start: 2017-01-01 | End: 2017-01-01 | Stop reason: HOSPADM

## 2017-01-01 RX ORDER — FUROSEMIDE 80 MG
80 TABLET ORAL EVERY OTHER DAY
Status: ON HOLD
Start: 2017-01-01 | End: 2017-01-01

## 2017-01-01 RX ADMIN — PRAVASTATIN SODIUM 40 MG: 40 TABLET ORAL at 23:13

## 2017-01-01 RX ADMIN — TRAMADOL HYDROCHLORIDE 50 MG: 50 TABLET, COATED ORAL at 21:15

## 2017-01-01 RX ADMIN — SOTALOL HYDROCHLORIDE 160 MG: 80 TABLET ORAL at 08:15

## 2017-01-01 RX ADMIN — CLOPIDOGREL 75 MG: 75 TABLET, FILM COATED ORAL at 09:32

## 2017-01-01 RX ADMIN — PANTOPRAZOLE SODIUM 40 MG: 40 TABLET, DELAYED RELEASE ORAL at 06:02

## 2017-01-01 RX ADMIN — ASPIRIN 162 MG: 81 TABLET, CHEWABLE ORAL at 09:09

## 2017-01-01 RX ADMIN — PANTOPRAZOLE SODIUM 40 MG: 40 TABLET, DELAYED RELEASE ORAL at 06:34

## 2017-01-01 RX ADMIN — TAMSULOSIN HYDROCHLORIDE 0.4 MG: 0.4 CAPSULE ORAL at 09:11

## 2017-01-01 RX ADMIN — SODIUM BICARBONATE 50 MEQ: 84 INJECTION, SOLUTION INTRAVENOUS at 15:05

## 2017-01-01 RX ADMIN — FUROSEMIDE 60 MG: 40 TABLET ORAL at 06:22

## 2017-01-01 RX ADMIN — POTASSIUM CHLORIDE 20 MEQ: 750 CAPSULE, EXTENDED RELEASE ORAL at 08:32

## 2017-01-01 RX ADMIN — DILTIAZEM HYDROCHLORIDE 10 MG/HR: 100 INJECTION, POWDER, LYOPHILIZED, FOR SOLUTION INTRAVENOUS at 18:57

## 2017-01-01 RX ADMIN — FUROSEMIDE 60 MG: 40 TABLET ORAL at 06:34

## 2017-01-01 RX ADMIN — TIOTROPIUM BROMIDE 1 CAPSULE: 18 CAPSULE ORAL; RESPIRATORY (INHALATION) at 09:02

## 2017-01-01 RX ADMIN — FUROSEMIDE 40 MG: 10 INJECTION, SOLUTION INTRAMUSCULAR; INTRAVENOUS at 12:16

## 2017-01-01 RX ADMIN — DIPHENHYDRAMINE HYDROCHLORIDE 50 MG: 50 INJECTION, SOLUTION INTRAMUSCULAR; INTRAVENOUS at 10:26

## 2017-01-01 RX ADMIN — IOPAMIDOL 95 ML: 755 INJECTION, SOLUTION INTRAVENOUS at 04:36

## 2017-01-01 RX ADMIN — PRAVASTATIN SODIUM 40 MG: 40 TABLET ORAL at 23:31

## 2017-01-01 RX ADMIN — METOPROLOL SUCCINATE 12.5 MG: 25 TABLET, FILM COATED, EXTENDED RELEASE ORAL at 21:53

## 2017-01-01 RX ADMIN — HYDROCODONE BITARTRATE AND ACETAMINOPHEN 1 TABLET: 7.5; 325 TABLET ORAL at 00:33

## 2017-01-01 RX ADMIN — TIOTROPIUM BROMIDE 1 CAPSULE: 18 CAPSULE ORAL; RESPIRATORY (INHALATION) at 08:10

## 2017-01-01 RX ADMIN — SOTALOL HYDROCHLORIDE 160 MG: 80 TABLET ORAL at 09:11

## 2017-01-01 RX ADMIN — POTASSIUM CHLORIDE 20 MEQ: 750 CAPSULE, EXTENDED RELEASE ORAL at 09:32

## 2017-01-01 RX ADMIN — TRAMADOL HYDROCHLORIDE 50 MG: 50 TABLET, FILM COATED ORAL at 14:27

## 2017-01-01 RX ADMIN — SOTALOL HYDROCHLORIDE 120 MG: 120 TABLET ORAL at 20:08

## 2017-01-01 RX ADMIN — PANTOPRAZOLE SODIUM 40 MG: 40 TABLET, DELAYED RELEASE ORAL at 09:57

## 2017-01-01 RX ADMIN — TRAMADOL HYDROCHLORIDE 50 MG: 50 TABLET, COATED ORAL at 22:56

## 2017-01-01 RX ADMIN — PHENYLEPHRINE HYDROCHLORIDE 200 MCG: 10 INJECTION INTRAVENOUS at 11:10

## 2017-01-01 RX ADMIN — TRAMADOL HYDROCHLORIDE 50 MG: 50 TABLET, FILM COATED ORAL at 14:36

## 2017-01-01 RX ADMIN — POTASSIUM CHLORIDE 20 MEQ: 750 CAPSULE, EXTENDED RELEASE ORAL at 09:10

## 2017-01-01 RX ADMIN — TAMSULOSIN HYDROCHLORIDE 0.4 MG: 0.4 CAPSULE ORAL at 08:15

## 2017-01-01 RX ADMIN — ASPIRIN 162 MG: 81 TABLET, CHEWABLE ORAL at 08:22

## 2017-01-01 RX ADMIN — FUROSEMIDE 40 MG: 40 TABLET ORAL at 20:06

## 2017-01-01 RX ADMIN — PREDNISONE 20 MG: 20 TABLET ORAL at 23:13

## 2017-01-01 RX ADMIN — ONDANSETRON 4 MG: 2 INJECTION INTRAMUSCULAR; INTRAVENOUS at 12:54

## 2017-01-01 RX ADMIN — AMLODIPINE BESYLATE 5 MG: 5 TABLET ORAL at 09:11

## 2017-01-01 RX ADMIN — FUROSEMIDE 40 MG: 40 TABLET ORAL at 20:42

## 2017-01-01 RX ADMIN — TAMSULOSIN HYDROCHLORIDE 0.4 MG: 0.4 CAPSULE ORAL at 20:45

## 2017-01-01 RX ADMIN — POTASSIUM CHLORIDE 20 MEQ: 750 CAPSULE, EXTENDED RELEASE ORAL at 08:21

## 2017-01-01 RX ADMIN — POTASSIUM CHLORIDE 20 MEQ: 750 CAPSULE, EXTENDED RELEASE ORAL at 09:41

## 2017-01-01 RX ADMIN — HYDROCODONE BITARTRATE AND ACETAMINOPHEN 1 TABLET: 5; 325 TABLET ORAL at 02:10

## 2017-01-01 RX ADMIN — VASOPRESSIN 2 UNITS: 20 INJECTION INTRAMUSCULAR; SUBCUTANEOUS at 13:15

## 2017-01-01 RX ADMIN — TAMSULOSIN HYDROCHLORIDE 0.8 MG: 0.4 CAPSULE ORAL at 20:42

## 2017-01-01 RX ADMIN — CLOPIDOGREL 75 MG: 75 TABLET, FILM COATED ORAL at 09:22

## 2017-01-01 RX ADMIN — TRAMADOL HYDROCHLORIDE 50 MG: 50 TABLET, COATED ORAL at 17:14

## 2017-01-01 RX ADMIN — FENTANYL CITRATE 50 MCG: 50 INJECTION INTRAMUSCULAR; INTRAVENOUS at 14:36

## 2017-01-01 RX ADMIN — FUROSEMIDE 40 MG: 40 TABLET ORAL at 21:21

## 2017-01-01 RX ADMIN — DIGOXIN 375 MCG: 0.25 INJECTION INTRAMUSCULAR; INTRAVENOUS at 23:16

## 2017-01-01 RX ADMIN — WARFARIN SODIUM 2.5 MG: 2.5 TABLET ORAL at 20:05

## 2017-01-01 RX ADMIN — TAMSULOSIN HYDROCHLORIDE 0.4 MG: 0.4 CAPSULE ORAL at 08:03

## 2017-01-01 RX ADMIN — FUROSEMIDE 40 MG: 10 INJECTION, SOLUTION INTRAMUSCULAR; INTRAVENOUS at 05:05

## 2017-01-01 RX ADMIN — REGADENOSON 0.4 MG: 0.08 INJECTION, SOLUTION INTRAVENOUS at 09:55

## 2017-01-01 RX ADMIN — TAMSULOSIN HYDROCHLORIDE 0.4 MG: 0.4 CAPSULE ORAL at 17:14

## 2017-01-01 RX ADMIN — PANTOPRAZOLE SODIUM 40 MG: 40 TABLET, DELAYED RELEASE ORAL at 06:32

## 2017-01-01 RX ADMIN — TRAMADOL HYDROCHLORIDE 50 MG: 50 TABLET, COATED ORAL at 09:44

## 2017-01-01 RX ADMIN — TAMSULOSIN HYDROCHLORIDE 0.4 MG: 0.4 CAPSULE ORAL at 17:35

## 2017-01-01 RX ADMIN — PANTOPRAZOLE SODIUM 40 MG: 40 TABLET, DELAYED RELEASE ORAL at 05:42

## 2017-01-01 RX ADMIN — TRAMADOL HYDROCHLORIDE 50 MG: 50 TABLET, COATED ORAL at 03:20

## 2017-01-01 RX ADMIN — FUROSEMIDE 80 MG: 80 TABLET ORAL at 18:29

## 2017-01-01 RX ADMIN — TAMSULOSIN HYDROCHLORIDE 0.8 MG: 0.4 CAPSULE ORAL at 20:05

## 2017-01-01 RX ADMIN — FAMOTIDINE 20 MG: 10 INJECTION, SOLUTION INTRAVENOUS at 10:26

## 2017-01-01 RX ADMIN — FUROSEMIDE 40 MG: 40 TABLET ORAL at 20:50

## 2017-01-01 RX ADMIN — FENTANYL CITRATE 50 MCG: 50 INJECTION INTRAMUSCULAR; INTRAVENOUS at 15:01

## 2017-01-01 RX ADMIN — POTASSIUM CHLORIDE 40 MEQ: 750 CAPSULE, EXTENDED RELEASE ORAL at 10:43

## 2017-01-01 RX ADMIN — PANTOPRAZOLE SODIUM 40 MG: 40 TABLET, DELAYED RELEASE ORAL at 06:18

## 2017-01-01 RX ADMIN — TRAMADOL HYDROCHLORIDE 50 MG: 50 TABLET, COATED ORAL at 02:07

## 2017-01-01 RX ADMIN — FUROSEMIDE 60 MG: 40 TABLET ORAL at 11:06

## 2017-01-01 RX ADMIN — MORPHINE SULFATE 4 MG: 4 INJECTION, SOLUTION INTRAMUSCULAR; INTRAVENOUS at 03:47

## 2017-01-01 RX ADMIN — CLOPIDOGREL 75 MG: 75 TABLET, FILM COATED ORAL at 08:11

## 2017-01-01 RX ADMIN — WARFARIN SODIUM 5 MG: 5 TABLET ORAL at 17:47

## 2017-01-01 RX ADMIN — PANTOPRAZOLE SODIUM 40 MG: 40 TABLET, DELAYED RELEASE ORAL at 06:14

## 2017-01-01 RX ADMIN — AMLODIPINE BESYLATE 5 MG: 5 TABLET ORAL at 08:52

## 2017-01-01 RX ADMIN — HYDROCODONE BITARTRATE AND ACETAMINOPHEN 1 TABLET: 7.5; 325 TABLET ORAL at 06:20

## 2017-01-01 RX ADMIN — DABIGATRAN ETEXILATE MESYLATE 150 MG: 150 CAPSULE ORAL at 03:12

## 2017-01-01 RX ADMIN — TRAMADOL HYDROCHLORIDE 50 MG: 50 TABLET, COATED ORAL at 23:09

## 2017-01-01 RX ADMIN — SILDENAFIL 20 MG: 20 TABLET ORAL at 14:32

## 2017-01-01 RX ADMIN — Medication 12 MILLICURIE: at 07:17

## 2017-01-01 RX ADMIN — HYDROCODONE BITARTRATE AND ACETAMINOPHEN 1 TABLET: 7.5; 325 TABLET ORAL at 22:52

## 2017-01-01 RX ADMIN — CLOPIDOGREL 75 MG: 75 TABLET, FILM COATED ORAL at 08:42

## 2017-01-01 RX ADMIN — ROCURONIUM BROMIDE 50 MG: 10 INJECTION INTRAVENOUS at 10:55

## 2017-01-01 RX ADMIN — PANTOPRAZOLE SODIUM 40 MG: 40 TABLET, DELAYED RELEASE ORAL at 05:36

## 2017-01-01 RX ADMIN — SODIUM BICARBONATE 50 MEQ: 84 INJECTION, SOLUTION INTRAVENOUS at 15:13

## 2017-01-01 RX ADMIN — POTASSIUM CHLORIDE 40 MEQ: 750 CAPSULE, EXTENDED RELEASE ORAL at 14:29

## 2017-01-01 RX ADMIN — PHENYLEPHRINE HYDROCHLORIDE 200 MCG: 10 INJECTION INTRAVENOUS at 11:05

## 2017-01-01 RX ADMIN — TAMSULOSIN HYDROCHLORIDE 0.8 MG: 0.4 CAPSULE ORAL at 20:50

## 2017-01-01 RX ADMIN — TAMSULOSIN HYDROCHLORIDE 0.8 MG: 0.4 CAPSULE ORAL at 21:21

## 2017-01-01 RX ADMIN — DABIGATRAN ETEXILATE MESYLATE 150 MG: 150 CAPSULE ORAL at 06:30

## 2017-01-01 RX ADMIN — DABIGATRAN ETEXILATE MESYLATE 150 MG: 150 CAPSULE ORAL at 10:43

## 2017-01-01 RX ADMIN — SOTALOL HYDROCHLORIDE 160 MG: 80 TABLET ORAL at 22:01

## 2017-01-01 RX ADMIN — TRAMADOL HYDROCHLORIDE 50 MG: 50 TABLET, FILM COATED ORAL at 23:37

## 2017-01-01 RX ADMIN — CLOPIDOGREL 75 MG: 75 TABLET, FILM COATED ORAL at 08:26

## 2017-01-01 RX ADMIN — TIOTROPIUM BROMIDE 1 CAPSULE: 18 CAPSULE ORAL; RESPIRATORY (INHALATION) at 08:05

## 2017-01-01 RX ADMIN — EPINEPHRINE 1 MG: 0.1 INJECTION, SOLUTION ENDOTRACHEAL; INTRACARDIAC; INTRAVENOUS at 15:07

## 2017-01-01 RX ADMIN — HYDROCODONE BITARTRATE AND ACETAMINOPHEN 1 TABLET: 7.5; 325 TABLET ORAL at 20:26

## 2017-01-01 RX ADMIN — METOPROLOL SUCCINATE 50 MG: 50 TABLET, FILM COATED, EXTENDED RELEASE ORAL at 08:38

## 2017-01-01 RX ADMIN — ONDANSETRON 4 MG: 2 INJECTION INTRAMUSCULAR; INTRAVENOUS at 12:58

## 2017-01-01 RX ADMIN — ASPIRIN 162 MG: 81 TABLET, CHEWABLE ORAL at 09:40

## 2017-01-01 RX ADMIN — TRAMADOL HYDROCHLORIDE 50 MG: 50 TABLET, FILM COATED ORAL at 13:45

## 2017-01-01 RX ADMIN — METOPROLOL SUCCINATE 50 MG: 50 TABLET, FILM COATED, EXTENDED RELEASE ORAL at 21:35

## 2017-01-01 RX ADMIN — TAMSULOSIN HYDROCHLORIDE 0.4 MG: 0.4 CAPSULE ORAL at 20:06

## 2017-01-01 RX ADMIN — SILDENAFIL 20 MG: 20 TABLET ORAL at 06:32

## 2017-01-01 RX ADMIN — METOPROLOL SUCCINATE 50 MG: 50 TABLET, FILM COATED, EXTENDED RELEASE ORAL at 09:19

## 2017-01-01 RX ADMIN — PANTOPRAZOLE SODIUM 40 MG: 40 TABLET, DELAYED RELEASE ORAL at 06:00

## 2017-01-01 RX ADMIN — TIOTROPIUM BROMIDE 1 CAPSULE: 18 CAPSULE ORAL; RESPIRATORY (INHALATION) at 09:04

## 2017-01-01 RX ADMIN — ASPIRIN 81 MG: 81 TABLET ORAL at 20:33

## 2017-01-01 RX ADMIN — EPINEPHRINE 1 MG: 0.1 INJECTION, SOLUTION ENDOTRACHEAL; INTRACARDIAC; INTRAVENOUS at 15:16

## 2017-01-01 RX ADMIN — PANTOPRAZOLE SODIUM 40 MG: 40 TABLET, DELAYED RELEASE ORAL at 05:45

## 2017-01-01 RX ADMIN — SOTALOL HYDROCHLORIDE 120 MG: 120 TABLET ORAL at 08:30

## 2017-01-01 RX ADMIN — TAMSULOSIN HYDROCHLORIDE 0.4 MG: 0.4 CAPSULE ORAL at 23:13

## 2017-01-01 RX ADMIN — SINCALIDE 2.1 MCG: 5 INJECTION, POWDER, LYOPHILIZED, FOR SOLUTION INTRAVENOUS at 09:00

## 2017-01-01 RX ADMIN — NITROGLYCERIN 1 INCH: 20 OINTMENT TOPICAL at 03:47

## 2017-01-01 RX ADMIN — FAMOTIDINE 20 MG: 10 INJECTION, SOLUTION INTRAVENOUS at 11:27

## 2017-01-01 RX ADMIN — SPIRONOLACTONE 25 MG: 25 TABLET, FILM COATED ORAL at 16:27

## 2017-01-01 RX ADMIN — POTASSIUM CHLORIDE 20 MEQ: 750 CAPSULE, EXTENDED RELEASE ORAL at 08:47

## 2017-01-01 RX ADMIN — DABIGATRAN ETEXILATE MESYLATE 150 MG: 150 CAPSULE ORAL at 15:04

## 2017-01-01 RX ADMIN — SODIUM CHLORIDE 250 ML: 9 INJECTION, SOLUTION INTRAVENOUS at 09:19

## 2017-01-01 RX ADMIN — SODIUM CHLORIDE: 9 INJECTION, SOLUTION INTRAVENOUS at 10:49

## 2017-01-01 RX ADMIN — TAMSULOSIN HYDROCHLORIDE 0.4 MG: 0.4 CAPSULE ORAL at 18:07

## 2017-01-01 RX ADMIN — HYDROCODONE BITARTRATE AND ACETAMINOPHEN 1 TABLET: 5; 325 TABLET ORAL at 13:06

## 2017-01-01 RX ADMIN — ROCURONIUM BROMIDE 10 MG: 10 INJECTION INTRAVENOUS at 12:10

## 2017-01-01 RX ADMIN — EPINEPHRINE 1 MG: 0.1 INJECTION, SOLUTION ENDOTRACHEAL; INTRACARDIAC; INTRAVENOUS at 15:13

## 2017-01-01 RX ADMIN — MEBROFENIN 1 DOSE: 45 INJECTION, POWDER, LYOPHILIZED, FOR SOLUTION INTRAVENOUS at 07:45

## 2017-01-01 RX ADMIN — HYDROCODONE BITARTRATE AND ACETAMINOPHEN 1 TABLET: 7.5; 325 TABLET ORAL at 22:53

## 2017-01-01 RX ADMIN — ASPIRIN 162 MG: 81 TABLET, CHEWABLE ORAL at 08:43

## 2017-01-01 RX ADMIN — PREDNISONE 20 MG: 20 TABLET ORAL at 08:15

## 2017-01-01 RX ADMIN — ASPIRIN 162 MG: 81 TABLET, CHEWABLE ORAL at 09:06

## 2017-01-01 RX ADMIN — TAMSULOSIN HYDROCHLORIDE 0.4 MG: 0.4 CAPSULE ORAL at 17:00

## 2017-01-01 RX ADMIN — SALINE NASAL SPRAY 2 SPRAY: 1.5 SOLUTION NASAL at 01:22

## 2017-01-01 RX ADMIN — PHENYLEPHRINE HYDROCHLORIDE 100 MCG: 10 INJECTION INTRAVENOUS at 11:00

## 2017-01-01 RX ADMIN — PREDNISONE 40 MG: 20 TABLET ORAL at 09:35

## 2017-01-01 RX ADMIN — ASPIRIN 81 MG: 81 TABLET ORAL at 21:56

## 2017-01-01 RX ADMIN — WARFARIN SODIUM 5 MG: 5 TABLET ORAL at 17:43

## 2017-01-01 RX ADMIN — NITROGLYCERIN 0.4 MG: 0.4 TABLET SUBLINGUAL at 00:47

## 2017-01-01 RX ADMIN — SPIRONOLACTONE 25 MG: 25 TABLET, FILM COATED ORAL at 09:41

## 2017-01-01 RX ADMIN — PREDNISONE 40 MG: 20 TABLET ORAL at 08:52

## 2017-01-01 RX ADMIN — HYDROCODONE BITARTRATE AND ACETAMINOPHEN 1 TABLET: 7.5; 325 TABLET ORAL at 03:11

## 2017-01-01 RX ADMIN — SOTALOL HYDROCHLORIDE 80 MG: 80 TABLET ORAL at 14:49

## 2017-01-01 RX ADMIN — PANTOPRAZOLE SODIUM 40 MG: 40 TABLET, DELAYED RELEASE ORAL at 06:30

## 2017-01-01 RX ADMIN — METOPROLOL TARTRATE 50 MG: 50 TABLET ORAL at 15:46

## 2017-01-01 RX ADMIN — POTASSIUM CHLORIDE 40 MEQ: 750 CAPSULE, EXTENDED RELEASE ORAL at 09:22

## 2017-01-01 RX ADMIN — TAMSULOSIN HYDROCHLORIDE 0.4 MG: 0.4 CAPSULE ORAL at 09:35

## 2017-01-01 RX ADMIN — DABIGATRAN ETEXILATE MESYLATE 150 MG: 150 CAPSULE ORAL at 23:31

## 2017-01-01 RX ADMIN — WARFARIN SODIUM 2.5 MG: 2.5 TABLET ORAL at 18:14

## 2017-01-01 RX ADMIN — SILDENAFIL 20 MG: 20 TABLET ORAL at 13:35

## 2017-01-01 RX ADMIN — CLOPIDOGREL 75 MG: 75 TABLET, FILM COATED ORAL at 09:08

## 2017-01-01 RX ADMIN — ALLOPURINOL 300 MG: 300 TABLET ORAL at 22:01

## 2017-01-01 RX ADMIN — PREDNISONE 20 MG: 20 TABLET ORAL at 12:47

## 2017-01-01 RX ADMIN — TRAMADOL HYDROCHLORIDE 50 MG: 50 TABLET, COATED ORAL at 23:50

## 2017-01-01 RX ADMIN — TIOTROPIUM BROMIDE 1 CAPSULE: 18 CAPSULE ORAL; RESPIRATORY (INHALATION) at 07:43

## 2017-01-01 RX ADMIN — PRAVASTATIN SODIUM 40 MG: 40 TABLET ORAL at 20:04

## 2017-01-01 RX ADMIN — CLOPIDOGREL 75 MG: 75 TABLET, FILM COATED ORAL at 08:32

## 2017-01-01 RX ADMIN — ASPIRIN 81 MG: 81 TABLET ORAL at 20:04

## 2017-01-01 RX ADMIN — SPIRONOLACTONE 25 MG: 25 TABLET, FILM COATED ORAL at 08:21

## 2017-01-01 RX ADMIN — DABIGATRAN ETEXILATE MESYLATE 150 MG: 150 CAPSULE ORAL at 21:56

## 2017-01-01 RX ADMIN — METOPROLOL SUCCINATE 50 MG: 50 TABLET, FILM COATED, EXTENDED RELEASE ORAL at 20:34

## 2017-01-01 RX ADMIN — SODIUM CHLORIDE 500 ML: 9 INJECTION, SOLUTION INTRAVENOUS at 11:23

## 2017-01-01 RX ADMIN — DABIGATRAN ETEXILATE MESYLATE 150 MG: 150 CAPSULE ORAL at 14:21

## 2017-01-01 RX ADMIN — PREDNISONE 20 MG: 20 TABLET ORAL at 08:38

## 2017-01-01 RX ADMIN — CLOPIDOGREL 75 MG: 75 TABLET, FILM COATED ORAL at 09:40

## 2017-01-01 RX ADMIN — ASPIRIN 162 MG: 81 TABLET, CHEWABLE ORAL at 08:11

## 2017-01-01 RX ADMIN — HYDROCODONE BITARTRATE AND ACETAMINOPHEN 1 TABLET: 7.5; 325 TABLET ORAL at 12:38

## 2017-01-01 RX ADMIN — PANTOPRAZOLE SODIUM 40 MG: 40 TABLET, DELAYED RELEASE ORAL at 06:22

## 2017-01-01 RX ADMIN — SOTALOL HYDROCHLORIDE 120 MG: 120 TABLET ORAL at 08:27

## 2017-01-01 RX ADMIN — DIPHENHYDRAMINE HYDROCHLORIDE 25 MG: 50 INJECTION, SOLUTION INTRAMUSCULAR; INTRAVENOUS at 11:26

## 2017-01-01 RX ADMIN — DABIGATRAN ETEXILATE MESYLATE 150 MG: 150 CAPSULE ORAL at 11:33

## 2017-01-01 RX ADMIN — TRAMADOL HYDROCHLORIDE 50 MG: 50 TABLET, COATED ORAL at 05:43

## 2017-01-01 RX ADMIN — SODIUM CHLORIDE 125 ML/HR: 9 INJECTION, SOLUTION INTRAVENOUS at 17:03

## 2017-01-01 RX ADMIN — VASOPRESSIN 2 UNITS: 20 INJECTION INTRAMUSCULAR; SUBCUTANEOUS at 13:55

## 2017-01-01 RX ADMIN — FUROSEMIDE 40 MG: 40 TABLET ORAL at 09:35

## 2017-01-01 RX ADMIN — METOPROLOL SUCCINATE 50 MG: 50 TABLET, FILM COATED, EXTENDED RELEASE ORAL at 08:43

## 2017-01-01 RX ADMIN — Medication 1 DOSE: at 07:17

## 2017-01-01 RX ADMIN — METOPROLOL SUCCINATE 12.5 MG: 25 TABLET, FILM COATED, EXTENDED RELEASE ORAL at 09:32

## 2017-01-01 RX ADMIN — SOTALOL HYDROCHLORIDE 160 MG: 80 TABLET ORAL at 23:32

## 2017-01-01 RX ADMIN — METOPROLOL SUCCINATE 50 MG: 50 TABLET, FILM COATED, EXTENDED RELEASE ORAL at 08:50

## 2017-01-01 RX ADMIN — DILTIAZEM HYDROCHLORIDE 5 MG/HR: 100 INJECTION, POWDER, LYOPHILIZED, FOR SOLUTION INTRAVENOUS at 00:37

## 2017-01-01 RX ADMIN — POTASSIUM CHLORIDE 10 MEQ: 1.5 POWDER, FOR SOLUTION ORAL at 23:14

## 2017-01-01 RX ADMIN — ASPIRIN 325 MG: 325 TABLET ORAL at 20:48

## 2017-01-01 RX ADMIN — POTASSIUM CHLORIDE 40 MEQ: 750 CAPSULE, EXTENDED RELEASE ORAL at 15:04

## 2017-01-01 RX ADMIN — METOPROLOL SUCCINATE 50 MG: 50 TABLET, FILM COATED, EXTENDED RELEASE ORAL at 08:26

## 2017-01-01 RX ADMIN — FUROSEMIDE 60 MG: 40 TABLET ORAL at 12:28

## 2017-01-01 RX ADMIN — PREDNISONE 40 MG: 20 TABLET ORAL at 18:07

## 2017-01-01 RX ADMIN — FUROSEMIDE 20 MG: 20 TABLET ORAL at 09:32

## 2017-01-01 RX ADMIN — CLOPIDOGREL 75 MG: 75 TABLET, FILM COATED ORAL at 09:29

## 2017-01-01 RX ADMIN — PHENYLEPHRINE HYDROCHLORIDE 2 SPRAY: 0.5 SPRAY NASAL at 01:13

## 2017-01-01 RX ADMIN — ATORVASTATIN CALCIUM 20 MG: 20 TABLET, FILM COATED ORAL at 08:42

## 2017-01-01 RX ADMIN — ASPIRIN 162 MG: 81 TABLET, CHEWABLE ORAL at 08:42

## 2017-01-01 RX ADMIN — SODIUM CHLORIDE 250 ML: 0.9 INJECTION, SOLUTION INTRAVENOUS at 21:29

## 2017-01-01 RX ADMIN — METOPROLOL SUCCINATE 50 MG: 50 TABLET, FILM COATED, EXTENDED RELEASE ORAL at 09:41

## 2017-01-01 RX ADMIN — NITROGLYCERIN 0.4 MG: 0.4 TABLET SUBLINGUAL at 00:56

## 2017-01-01 RX ADMIN — HYDROCODONE BITARTRATE AND ACETAMINOPHEN 1 TABLET: 5; 325 TABLET ORAL at 05:05

## 2017-01-01 RX ADMIN — METOPROLOL SUCCINATE 50 MG: 50 TABLET, FILM COATED, EXTENDED RELEASE ORAL at 08:42

## 2017-01-01 RX ADMIN — EPINEPHRINE 1 MG: 0.1 INJECTION, SOLUTION ENDOTRACHEAL; INTRACARDIAC; INTRAVENOUS at 15:28

## 2017-01-01 RX ADMIN — TAMSULOSIN HYDROCHLORIDE 0.4 MG: 0.4 CAPSULE ORAL at 08:26

## 2017-01-01 RX ADMIN — FUROSEMIDE 60 MG: 40 TABLET ORAL at 05:19

## 2017-01-01 RX ADMIN — METHYLPREDNISOLONE SODIUM SUCCINATE 125 MG: 125 INJECTION, POWDER, FOR SOLUTION INTRAMUSCULAR; INTRAVENOUS at 10:26

## 2017-01-01 RX ADMIN — PROPOFOL 200 MG: 10 INJECTION, EMULSION INTRAVENOUS at 10:55

## 2017-01-01 RX ADMIN — FUROSEMIDE 40 MG: 40 TABLET ORAL at 20:18

## 2017-01-01 RX ADMIN — ASPIRIN 162 MG: 81 TABLET, CHEWABLE ORAL at 09:22

## 2017-01-01 RX ADMIN — DABIGATRAN ETEXILATE MESYLATE 150 MG: 150 CAPSULE ORAL at 04:08

## 2017-01-01 RX ADMIN — TAMSULOSIN HYDROCHLORIDE 0.4 MG: 0.4 CAPSULE ORAL at 19:36

## 2017-01-01 RX ADMIN — VASOPRESSIN 1 UNITS: 20 INJECTION INTRAMUSCULAR; SUBCUTANEOUS at 12:25

## 2017-01-01 RX ADMIN — HYDROCODONE BITARTRATE AND ACETAMINOPHEN 1 TABLET: 5; 325 TABLET ORAL at 13:58

## 2017-01-01 RX ADMIN — SODIUM CHLORIDE 1000 ML: 9 INJECTION, SOLUTION INTRAVENOUS at 15:10

## 2017-01-01 RX ADMIN — AMLODIPINE BESYLATE 5 MG: 5 TABLET ORAL at 09:35

## 2017-01-01 RX ADMIN — WARFARIN SODIUM 4 MG: 4 TABLET ORAL at 22:48

## 2017-01-01 RX ADMIN — AMLODIPINE BESYLATE 5 MG: 5 TABLET ORAL at 08:18

## 2017-01-01 RX ADMIN — SILDENAFIL 20 MG: 20 TABLET ORAL at 22:33

## 2017-01-01 RX ADMIN — TRAMADOL HYDROCHLORIDE 50 MG: 50 TABLET, FILM COATED ORAL at 22:04

## 2017-01-01 RX ADMIN — PRAVASTATIN SODIUM 40 MG: 40 TABLET ORAL at 22:01

## 2017-01-01 RX ADMIN — POTASSIUM CHLORIDE 20 MEQ: 750 CAPSULE, EXTENDED RELEASE ORAL at 09:35

## 2017-01-01 RX ADMIN — DILTIAZEM HYDROCHLORIDE 15 MG/HR: 100 INJECTION, POWDER, LYOPHILIZED, FOR SOLUTION INTRAVENOUS at 03:53

## 2017-01-01 RX ADMIN — ONDANSETRON 4 MG: 2 INJECTION INTRAMUSCULAR; INTRAVENOUS at 13:15

## 2017-01-01 RX ADMIN — TAMSULOSIN HYDROCHLORIDE 0.4 MG: 0.4 CAPSULE ORAL at 20:34

## 2017-01-01 RX ADMIN — PREDNISONE 20 MG: 20 TABLET ORAL at 09:10

## 2017-01-01 RX ADMIN — AMLODIPINE BESYLATE 5 MG: 5 TABLET ORAL at 08:15

## 2017-01-01 RX ADMIN — DABIGATRAN ETEXILATE MESYLATE 150 MG: 150 CAPSULE ORAL at 12:47

## 2017-01-01 RX ADMIN — ASPIRIN 81 MG: 81 TABLET ORAL at 20:08

## 2017-01-01 RX ADMIN — SILDENAFIL 20 MG: 20 TABLET ORAL at 16:29

## 2017-01-01 RX ADMIN — DILTIAZEM HYDROCHLORIDE 5 MG/HR: 100 INJECTION, POWDER, LYOPHILIZED, FOR SOLUTION INTRAVENOUS at 12:33

## 2017-01-01 RX ADMIN — SILDENAFIL 20 MG: 20 TABLET ORAL at 06:30

## 2017-01-01 RX ADMIN — WARFARIN SODIUM 2.5 MG: 2.5 TABLET ORAL at 14:31

## 2017-01-01 RX ADMIN — PHENYLEPHRINE HYDROCHLORIDE 200 MCG: 10 INJECTION INTRAVENOUS at 11:15

## 2017-01-01 RX ADMIN — SILDENAFIL 20 MG: 20 TABLET ORAL at 06:45

## 2017-01-01 RX ADMIN — POTASSIUM CHLORIDE 20 MEQ: 750 CAPSULE, EXTENDED RELEASE ORAL at 08:11

## 2017-01-01 RX ADMIN — PANTOPRAZOLE SODIUM 40 MG: 40 TABLET, DELAYED RELEASE ORAL at 06:29

## 2017-01-01 RX ADMIN — METHOHEXITAL SODIUM 50 MG: 500 INJECTION, POWDER, LYOPHILIZED, FOR SOLUTION INTRAMUSCULAR; INTRAVENOUS; RECTAL at 16:50

## 2017-01-01 RX ADMIN — TAMSULOSIN HYDROCHLORIDE 0.4 MG: 0.4 CAPSULE ORAL at 20:26

## 2017-01-01 RX ADMIN — PRAVASTATIN SODIUM 40 MG: 40 TABLET ORAL at 22:57

## 2017-01-01 RX ADMIN — TRAMADOL HYDROCHLORIDE 50 MG: 50 TABLET, COATED ORAL at 08:26

## 2017-01-01 RX ADMIN — SPIRONOLACTONE 25 MG: 25 TABLET, FILM COATED ORAL at 08:11

## 2017-01-01 RX ADMIN — PRAVASTATIN SODIUM 40 MG: 40 TABLET ORAL at 20:08

## 2017-01-01 RX ADMIN — EPINEPHRINE 1 MG: 0.1 INJECTION, SOLUTION ENDOTRACHEAL; INTRACARDIAC; INTRAVENOUS at 15:09

## 2017-01-01 RX ADMIN — PANTOPRAZOLE SODIUM 40 MG: 40 TABLET, DELAYED RELEASE ORAL at 05:56

## 2017-01-01 RX ADMIN — TRAMADOL HYDROCHLORIDE 50 MG: 50 TABLET, COATED ORAL at 22:32

## 2017-01-01 RX ADMIN — EPINEPHRINE 1 MG: 0.1 INJECTION, SOLUTION ENDOTRACHEAL; INTRACARDIAC; INTRAVENOUS at 15:22

## 2017-01-01 RX ADMIN — WARFARIN SODIUM 5 MG: 5 TABLET ORAL at 16:33

## 2017-01-01 RX ADMIN — MORPHINE SULFATE 4 MG: 4 INJECTION, SOLUTION INTRAMUSCULAR; INTRAVENOUS at 06:29

## 2017-01-01 RX ADMIN — VASOPRESSIN 2 UNITS: 20 INJECTION INTRAMUSCULAR; SUBCUTANEOUS at 13:00

## 2017-01-01 RX ADMIN — ESMOLOL HYDROCHLORIDE 100 MCG/KG/MIN: 10 INJECTION INTRAVENOUS at 17:20

## 2017-01-01 RX ADMIN — TAMSULOSIN HYDROCHLORIDE 0.4 MG: 0.4 CAPSULE ORAL at 08:18

## 2017-01-01 RX ADMIN — POTASSIUM CHLORIDE 20 MEQ: 750 CAPSULE, EXTENDED RELEASE ORAL at 09:29

## 2017-01-01 RX ADMIN — TAMSULOSIN HYDROCHLORIDE 0.4 MG: 0.4 CAPSULE ORAL at 21:35

## 2017-01-01 RX ADMIN — Medication 1 DOSE: at 09:55

## 2017-01-01 RX ADMIN — TRAMADOL HYDROCHLORIDE 50 MG: 50 TABLET, COATED ORAL at 00:43

## 2017-01-01 RX ADMIN — TAMSULOSIN HYDROCHLORIDE 0.4 MG: 0.4 CAPSULE ORAL at 20:37

## 2017-01-01 RX ADMIN — CLOPIDOGREL 75 MG: 75 TABLET, FILM COATED ORAL at 08:22

## 2017-01-01 RX ADMIN — TAMSULOSIN HYDROCHLORIDE 0.4 MG: 0.4 CAPSULE ORAL at 18:54

## 2017-01-01 RX ADMIN — ALLOPURINOL 300 MG: 300 TABLET ORAL at 20:33

## 2017-01-01 RX ADMIN — TEMAZEPAM 7.5 MG: 7.5 CAPSULE ORAL at 22:37

## 2017-01-01 RX ADMIN — TAMSULOSIN HYDROCHLORIDE 0.4 MG: 0.4 CAPSULE ORAL at 12:45

## 2017-01-01 RX ADMIN — VASOPRESSIN 2 UNITS: 20 INJECTION INTRAMUSCULAR; SUBCUTANEOUS at 12:02

## 2017-01-01 RX ADMIN — FUROSEMIDE 40 MG: 40 TABLET ORAL at 08:22

## 2017-01-01 RX ADMIN — PREDNISONE 20 MG: 20 TABLET ORAL at 08:03

## 2017-01-01 RX ADMIN — HYDROCODONE BITARTRATE AND ACETAMINOPHEN 1 TABLET: 5; 325 TABLET ORAL at 22:16

## 2017-01-01 RX ADMIN — METOPROLOL SUCCINATE 50 MG: 50 TABLET, FILM COATED, EXTENDED RELEASE ORAL at 20:45

## 2017-01-01 RX ADMIN — PANTOPRAZOLE SODIUM 40 MG: 40 TABLET, DELAYED RELEASE ORAL at 06:47

## 2017-01-01 RX ADMIN — ATROPINE SULFATE 1 MG: 1 INJECTION, SOLUTION INTRAMUSCULAR; INTRAVENOUS; SUBCUTANEOUS at 15:01

## 2017-01-01 RX ADMIN — ASPIRIN 81 MG: 81 TABLET ORAL at 18:07

## 2017-01-01 RX ADMIN — ALLOPURINOL 300 MG: 300 TABLET ORAL at 21:36

## 2017-01-01 RX ADMIN — METOPROLOL SUCCINATE 50 MG: 50 TABLET, FILM COATED, EXTENDED RELEASE ORAL at 20:26

## 2017-01-01 RX ADMIN — SILDENAFIL 20 MG: 20 TABLET ORAL at 22:52

## 2017-01-01 RX ADMIN — DABIGATRAN ETEXILATE MESYLATE 150 MG: 150 CAPSULE ORAL at 22:01

## 2017-01-01 RX ADMIN — SPIRONOLACTONE 25 MG: 25 TABLET, FILM COATED ORAL at 09:44

## 2017-01-01 RX ADMIN — FUROSEMIDE 40 MG: 40 TABLET ORAL at 09:40

## 2017-01-01 RX ADMIN — PANTOPRAZOLE SODIUM 40 MG: 40 TABLET, DELAYED RELEASE ORAL at 08:18

## 2017-01-01 RX ADMIN — GLYCOPYRROLATE 0.6 MG: 0.2 INJECTION INTRAMUSCULAR; INTRAVENOUS at 13:40

## 2017-01-01 RX ADMIN — DABIGATRAN ETEXILATE MESYLATE 150 MG: 75 CAPSULE ORAL at 08:38

## 2017-01-01 RX ADMIN — PHENYLEPHRINE HYDROCHLORIDE 200 MCG: 10 INJECTION INTRAVENOUS at 11:50

## 2017-01-01 RX ADMIN — METOPROLOL SUCCINATE 50 MG: 50 TABLET, FILM COATED, EXTENDED RELEASE ORAL at 08:11

## 2017-01-01 RX ADMIN — FAMOTIDINE 20 MG: 10 INJECTION, SOLUTION INTRAVENOUS at 10:05

## 2017-01-01 RX ADMIN — PREDNISONE 20 MG: 20 TABLET ORAL at 08:18

## 2017-01-01 RX ADMIN — DILTIAZEM HYDROCHLORIDE 10 MG/HR: 100 INJECTION, POWDER, LYOPHILIZED, FOR SOLUTION INTRAVENOUS at 10:19

## 2017-01-01 RX ADMIN — TAMSULOSIN HYDROCHLORIDE 0.4 MG: 0.4 CAPSULE ORAL at 22:33

## 2017-01-01 RX ADMIN — TRAMADOL HYDROCHLORIDE 50 MG: 50 TABLET, COATED ORAL at 01:03

## 2017-01-01 RX ADMIN — METOPROLOL SUCCINATE 50 MG: 50 TABLET, FILM COATED, EXTENDED RELEASE ORAL at 09:06

## 2017-01-01 RX ADMIN — TRAMADOL HYDROCHLORIDE 50 MG: 50 TABLET, COATED ORAL at 11:11

## 2017-01-01 RX ADMIN — NEOSTIGMINE METHYLSULFATE 3 MG: 1 INJECTION INTRAVENOUS at 13:40

## 2017-01-01 RX ADMIN — ONDANSETRON 4 MG: 2 INJECTION INTRAMUSCULAR; INTRAVENOUS at 03:47

## 2017-01-01 RX ADMIN — NITROGLYCERIN 1 INCH: 20 OINTMENT TOPICAL at 16:38

## 2017-01-01 RX ADMIN — TIOTROPIUM BROMIDE 1 CAPSULE: 18 CAPSULE ORAL; RESPIRATORY (INHALATION) at 07:53

## 2017-01-01 RX ADMIN — FUROSEMIDE 60 MG: 40 TABLET ORAL at 08:41

## 2017-01-01 RX ADMIN — LIDOCAINE HYDROCHLORIDE 100 MG: 20 INJECTION, SOLUTION INFILTRATION; PERINEURAL at 10:55

## 2017-01-01 RX ADMIN — PANTOPRAZOLE SODIUM 40 MG: 40 TABLET, DELAYED RELEASE ORAL at 06:46

## 2017-01-01 RX ADMIN — ASPIRIN 81 MG: 81 TABLET ORAL at 23:31

## 2017-01-01 RX ADMIN — METOPROLOL TARTRATE 5 MG: 5 INJECTION INTRAVENOUS at 00:18

## 2017-01-01 RX ADMIN — SOTALOL HYDROCHLORIDE 80 MG: 80 TABLET ORAL at 08:52

## 2017-01-01 RX ADMIN — DABIGATRAN ETEXILATE MESYLATE 150 MG: 150 CAPSULE ORAL at 21:49

## 2017-01-01 RX ADMIN — ASPIRIN 81 MG: 81 TABLET ORAL at 22:01

## 2017-01-01 RX ADMIN — TEMAZEPAM 7.5 MG: 7.5 CAPSULE ORAL at 23:37

## 2017-01-01 RX ADMIN — ALLOPURINOL 300 MG: 300 TABLET ORAL at 21:56

## 2017-01-01 RX ADMIN — EPINEPHRINE 0.02 MCG/KG/MIN: 1 INJECTION, SOLUTION, CONCENTRATE INTRAVENOUS at 15:19

## 2017-01-01 RX ADMIN — SILDENAFIL 20 MG: 20 TABLET ORAL at 13:56

## 2017-01-01 RX ADMIN — FUROSEMIDE 40 MG: 40 TABLET ORAL at 08:11

## 2017-01-01 RX ADMIN — DABIGATRAN ETEXILATE MESYLATE 150 MG: 150 CAPSULE ORAL at 21:47

## 2017-01-01 RX ADMIN — CLOPIDOGREL 75 MG: 75 TABLET, FILM COATED ORAL at 08:43

## 2017-01-01 RX ADMIN — ASPIRIN 162 MG: 81 TABLET, CHEWABLE ORAL at 09:32

## 2017-01-01 RX ADMIN — HYDROCODONE BITARTRATE AND ACETAMINOPHEN 1 TABLET: 7.5; 325 TABLET ORAL at 21:53

## 2017-01-01 RX ADMIN — METOPROLOL SUCCINATE 50 MG: 50 TABLET, FILM COATED, EXTENDED RELEASE ORAL at 09:09

## 2017-01-01 RX ADMIN — FUROSEMIDE 20 MG: 20 TABLET ORAL at 08:32

## 2017-01-01 RX ADMIN — SOTALOL HYDROCHLORIDE 80 MG: 80 TABLET ORAL at 20:34

## 2017-01-01 RX ADMIN — FUROSEMIDE 80 MG: 80 TABLET ORAL at 09:55

## 2017-01-01 RX ADMIN — ACETAMINOPHEN 650 MG: 325 TABLET ORAL at 15:18

## 2017-01-01 RX ADMIN — TRAMADOL HYDROCHLORIDE 50 MG: 50 TABLET, FILM COATED ORAL at 13:52

## 2017-01-01 RX ADMIN — SOTALOL HYDROCHLORIDE 160 MG: 80 TABLET ORAL at 08:18

## 2017-01-01 RX ADMIN — SOTALOL HYDROCHLORIDE 120 MG: 120 TABLET ORAL at 21:46

## 2017-01-01 RX ADMIN — DABIGATRAN ETEXILATE MESYLATE 150 MG: 75 CAPSULE ORAL at 18:11

## 2017-01-01 RX ADMIN — DABIGATRAN ETEXILATE MESYLATE 150 MG: 150 CAPSULE ORAL at 10:20

## 2017-01-01 RX ADMIN — METHYLPREDNISOLONE SODIUM SUCCINATE 125 MG: 125 INJECTION, POWDER, FOR SOLUTION INTRAMUSCULAR; INTRAVENOUS at 11:29

## 2017-01-01 RX ADMIN — DILTIAZEM HYDROCHLORIDE 10 MG: 5 INJECTION, SOLUTION INTRAVENOUS at 00:35

## 2017-01-01 RX ADMIN — ATORVASTATIN CALCIUM 20 MG: 20 TABLET, FILM COATED ORAL at 09:19

## 2017-01-01 RX ADMIN — TRAMADOL HYDROCHLORIDE 50 MG: 50 TABLET, COATED ORAL at 18:07

## 2017-01-01 RX ADMIN — FUROSEMIDE 40 MG: 10 INJECTION, SOLUTION INTRAMUSCULAR; INTRAVENOUS at 17:54

## 2017-01-01 RX ADMIN — WARFARIN SODIUM 5 MG: 5 TABLET ORAL at 18:08

## 2017-01-01 RX ADMIN — VASOPRESSIN 2 UNITS: 20 INJECTION INTRAMUSCULAR; SUBCUTANEOUS at 12:30

## 2017-01-01 RX ADMIN — TRAMADOL HYDROCHLORIDE 50 MG: 50 TABLET, COATED ORAL at 10:43

## 2017-01-01 RX ADMIN — DABIGATRAN ETEXILATE MESYLATE 150 MG: 150 CAPSULE ORAL at 18:10

## 2017-01-01 RX ADMIN — PANTOPRAZOLE SODIUM 40 MG: 40 TABLET, DELAYED RELEASE ORAL at 06:28

## 2017-01-01 RX ADMIN — TAMSULOSIN HYDROCHLORIDE 0.4 MG: 0.4 CAPSULE ORAL at 21:53

## 2017-01-01 RX ADMIN — ASPIRIN 162 MG: 81 TABLET, CHEWABLE ORAL at 08:27

## 2017-01-01 RX ADMIN — POTASSIUM CHLORIDE 20 MEQ: 750 CAPSULE, EXTENDED RELEASE ORAL at 09:55

## 2017-01-01 RX ADMIN — PRAVASTATIN SODIUM 40 MG: 40 TABLET ORAL at 21:56

## 2017-01-01 RX ADMIN — PANTOPRAZOLE SODIUM 40 MG: 40 TABLET, DELAYED RELEASE ORAL at 08:41

## 2017-01-01 RX ADMIN — EPINEPHRINE 1 MG: 0.1 INJECTION, SOLUTION ENDOTRACHEAL; INTRACARDIAC; INTRAVENOUS at 15:23

## 2017-01-01 RX ADMIN — ATROPINE SULFATE 1 MG: 1 INJECTION, SOLUTION INTRAMUSCULAR; INTRAVENOUS; SUBCUTANEOUS at 15:09

## 2017-01-01 RX ADMIN — METOPROLOL SUCCINATE 50 MG: 50 TABLET, FILM COATED, EXTENDED RELEASE ORAL at 20:06

## 2017-01-01 RX ADMIN — METOPROLOL SUCCINATE 50 MG: 50 TABLET, FILM COATED, EXTENDED RELEASE ORAL at 08:22

## 2017-01-01 RX ADMIN — TAMSULOSIN HYDROCHLORIDE 0.4 MG: 0.4 CAPSULE ORAL at 17:16

## 2017-01-01 RX ADMIN — PREDNISONE 20 MG: 20 TABLET ORAL at 08:26

## 2017-01-01 RX ADMIN — PANTOPRAZOLE SODIUM 40 MG: 40 TABLET, DELAYED RELEASE ORAL at 09:53

## 2017-01-01 RX ADMIN — TAMSULOSIN HYDROCHLORIDE 0.8 MG: 0.4 CAPSULE ORAL at 20:18

## 2017-01-01 RX ADMIN — POTASSIUM CHLORIDE 20 MEQ: 750 CAPSULE, EXTENDED RELEASE ORAL at 09:44

## 2017-01-01 RX ADMIN — ASPIRIN 162 MG: 81 TABLET, CHEWABLE ORAL at 08:30

## 2017-01-01 RX ADMIN — ASPIRIN 162 MG: 81 TABLET, CHEWABLE ORAL at 09:29

## 2017-01-01 RX ADMIN — SILDENAFIL 20 MG: 20 TABLET ORAL at 21:53

## 2017-01-01 RX ADMIN — Medication 1 DOSE: at 08:02

## 2017-01-01 RX ADMIN — ALLOPURINOL 300 MG: 300 TABLET ORAL at 20:04

## 2017-01-01 RX ADMIN — FUROSEMIDE 40 MG: 40 TABLET ORAL at 09:11

## 2017-01-01 RX ADMIN — FUROSEMIDE 40 MG: 40 TABLET ORAL at 09:29

## 2017-01-01 RX ADMIN — WARFARIN SODIUM 5 MG: 5 TABLET ORAL at 16:00

## 2017-01-01 RX ADMIN — SOTALOL HYDROCHLORIDE 160 MG: 80 TABLET ORAL at 22:57

## 2017-01-01 RX ADMIN — FUROSEMIDE 60 MG: 40 TABLET ORAL at 06:15

## 2017-01-01 RX ADMIN — DABIGATRAN ETEXILATE MESYLATE 150 MG: 150 CAPSULE ORAL at 09:30

## 2017-01-01 RX ADMIN — DABIGATRAN ETEXILATE MESYLATE 150 MG: 150 CAPSULE ORAL at 14:49

## 2017-01-01 RX ADMIN — TRAMADOL HYDROCHLORIDE 50 MG: 50 TABLET, COATED ORAL at 23:15

## 2017-01-01 RX ADMIN — TAMSULOSIN HYDROCHLORIDE 0.4 MG: 0.4 CAPSULE ORAL at 03:45

## 2017-01-01 RX ADMIN — MIDAZOLAM HYDROCHLORIDE 2 MG: 1 INJECTION, SOLUTION INTRAMUSCULAR; INTRAVENOUS at 10:08

## 2017-01-01 RX ADMIN — ASPIRIN 81 MG: 81 TABLET ORAL at 22:56

## 2017-01-01 RX ADMIN — CLOPIDOGREL 75 MG: 75 TABLET, FILM COATED ORAL at 09:06

## 2017-01-01 RX ADMIN — SPIRONOLACTONE 25 MG: 25 TABLET, FILM COATED ORAL at 09:29

## 2017-01-01 RX ADMIN — PHENYLEPHRINE HYDROCHLORIDE 200 MCG: 10 INJECTION INTRAVENOUS at 11:35

## 2017-01-05 NOTE — PROGRESS NOTES
Subjective   Kenan Garrison is a 71 y.o. male.   Patient needs reevaluation for chronic oxygen use  History of Present Illness   Patient's chronically on 3 L of O2 for CHF uses is continuously additionally he's had an adverse reaction to amiodarone in October now has interstitial lung disease which is added to his O2 necessity Brown be treated for 3 more months with prednisone amiodarone has been stopped.  A. fib is controlled.  Does have some anemia.  Also did have mild elevation blood sugar which we will pursue as he is on longer term therapy for prednisone.  CHF seems be doing fairly well for patient.  Short of breath with exertion but nothing that he perceives is unusual since he was hospitalized.    Review of Systems   Respiratory:        Needs evaluation from oxygen use has both CHF and a new diagnosis of interstitial lung disease   Cardiovascular: Positive for chest pain.        Has had recent evaluation by cardiologist sees cardiologist later this month.  Atrial fibrillation   All other systems reviewed and are negative.      Objective   Vitals:    01/05/17 0816   BP: 150/80   Pulse: 63   Temp: 98 °F (36.7 °C)   SpO2: 91%   Weight: 234 lb (106 kg)     Physical Exam   Constitutional: He appears well-developed and well-nourished.   Nasal cannula set at 3 L patient has on now   Cardiovascular: Normal rate, regular rhythm and normal heart sounds.    Pulmonary/Chest: Effort normal and breath sounds normal.   Nursing note and vitals reviewed.      Lab Results   Component Value Date    INR 1.12 (H) 12/08/2016    INR 1.32 (H) 11/06/2016    INR 1.17 (H) 10/27/2016     Patient was evaluated for continued need for chronic continuous O2 therapy at 3 L.  At rest on his 3 L nasal O2 he is 91% this was removed resting on room air he is 80% and with exertion he quickly desaturates again without oxygen to 81% on room air.  Patient has ongoing need for his O2 at 3 L.  With recently new diagnosis of interstitial lung disease  exacerbating the need for his oxygen.  Procedures    Assessment/Plan   1.  Evaluation for chronic continuous oxygen therapy at 3 L.  Patient still needs will do the one-year renewal.    2.  Interstitial lung disease being followed by pulmonary this was an adverse reaction to amiodarone is going to have a total 3 months of steroids is tender plan.    3.  Anemia mild we'll get updated CBC is still low we will do the full anemia studies.  Call for lab results Monday.    4.  Elevated glucose mild we'll get updated values with patient on prolonged prednisone therapy    5.  CHF readily stable by history plan continue present therapy.  We'll recheck patient in 6 months plus other pending tests necessitating quicker rfollow-up.    Much of this encounter note is an electronic transcription/translation of spoken language to printed text.  The electronic translation of spoken language may permit erroneous, or at times, nonsensical words or phrases to be inadvertently transcribed.  Although I have reviewed the note for such errors, some may still exist.

## 2017-01-05 NOTE — MR AVS SNAPSHOT
Kenan S Meli   1/5/2017 8:05 AM   Office Visit    Dept Phone:  [phone]   Encounter #:  [id number]    Provider:  Dileep Mccall Jr., MD   Department:  [facility]                Your Full Care Plan              Today's Medication Changes          These changes are accurate as of: 1/5/17  8:53 AM.  If you have any questions, ask your nurse or doctor.               Medication(s)that have changed:     acetaminophen 325 MG tablet   Commonly known as:  TYLENOL   Take 2 tablets by mouth Every 6 (Six) Hours As Needed for mild pain (1-3).   What changed:  Another medication with the same name was removed. Continue taking this medication, and follow the directions you see here.                  Your Updated Medication List          This list is accurate as of: 1/5/17  8:53 AM.  Always use your most recent med list.                acetaminophen 325 MG tablet   Commonly known as:  TYLENOL   Take 2 tablets by mouth Every 6 (Six) Hours As Needed for mild pain (1-3).       allopurinol 300 MG tablet   Commonly known as:  ZYLOPRIM       amLODIPine 5 MG tablet   Commonly known as:  NORVASC       aspirin 81 MG EC tablet       dabigatran etexilate 150 MG capsu   Commonly known as:  PRADAXA       furosemide 40 MG tablet   Commonly known as:  LASIX       metoprolol succinate  MG 24 hr tablet   Commonly known as:  TOPROL-XL   Take 1 tablet by mouth Daily.       Naproxen Sodium 220 MG capsule       omeprazole 40 MG capsule   Commonly known as:  priLOSEC       potassium chloride 20 MEQ CR tablet   Commonly known as:  K-DUR,KLOR-CON       PREDNISONE PO       ramipril 10 MG capsule   Commonly known as:  ALTACE       sotalol 120 MG tablet   Commonly known as:  BETAPACE   Take 1 tablet by mouth Every 12 (Twelve) Hours.       tamsulosin 0.4 MG capsule 24 hr capsule   Commonly known as:  FLOMAX       tiotropium 18 MCG per inhalation capsule   Commonly known as:   SPIRIVA   Place 2 puffs into inhaler and inhale Daily.       traMADol 50 MG tablet   Commonly known as:  ULTRAM               We Performed the Following     Basic Metabolic Panel     CBC & Differential       You Were Diagnosed With        Codes Comments    Interstitial lung disease    -  Primary ICD-10-CM: J84.9  ICD-9-CM: 515     Anemia, unspecified type     ICD-10-CM: D64.9  ICD-9-CM: 285.9     Blood glucose elevated     ICD-10-CM: R73.9  ICD-9-CM: 790.29       Instructions     None    Patient Instructions History      Upcoming Appointments     Visit Type Date Time Department    SAME DAY 2017  8:05 AM [facility]    OFFICE VISIT 2017  9:00 AM [facility]      [employer] Signup     [employer] allows you to send messages to your doctor, view your test results, renew your prescriptions, schedule appointments, and more. To sign up, go to [employer] and click on the Sign Up Now link in the New User? box. Enter your [employer] Activation Code exactly as it appears below along with the last four digits of your Social Security Number and your Date of Birth () to complete the sign-up process. If you do not sign up before the expiration date, you must request a new code.    [employer] Activation Code: [email or web address]  Expires: 2017  5:36 AM    If you have questions, you can email [email or web address] or call [phone] to talk to our [employer] staff. Remember, [employer] is NOT to be used for urgent needs. For medical emergencies, dial 911.               Other Info from Your Visit           Your Appointments     2017  9:00 AM EST   Office Visit with Herman Gonzalez MD   [facility] [place] [facility] (--)    [address] [facility]. [address] [place] [id number] [phone]           Arrive 15 minutes prior to appointment.              Allergies     Albuterol      Amiodarone      Biaxin [Clarithromycin]      Lipitor [Atorvastatin]   "Myalgia      Reason for Visit     Follow-up hospital follow [facility]    Chest Pain with exertion-had work up in er-has appt with card 1-30    Follow-up O2 renewal      Vital Signs     Blood Pressure Pulse Temperature Height Weight Oxygen Saturation    150/80 (BP Location: Left arm, Patient Position: Sitting, Cuff Size: Adult) 63 98 °F (36.7 °C) (Oral) 69\" (175.3 cm) 234 lb (106 kg) 91%    Body Mass Index Smoking Status                34.56 kg/m2 Former Smoker          Problems and Diagnoses Noted     Interstitial lung disease    Anemia        Blood glucose elevated            "

## 2017-01-17 PROBLEM — R07.2 PRECORDIAL PAIN: Status: ACTIVE | Noted: 2017-01-01

## 2017-01-20 PROBLEM — I48.92 ATRIAL FLUTTER WITH RAPID VENTRICULAR RESPONSE (HCC): Status: ACTIVE | Noted: 2017-01-01

## 2017-01-26 NOTE — MR AVS SNAPSHOT
Kenan Riverad   1/26/2017 1:20 PM   Office Visit    Dept Phone:  [phone]   Encounter #:  [id number]    Provider:  Dileep Mccall Jr., MD   Department:  [facility]                Your Full Care Plan              Today's Medication Changes          These changes are accurate as of: 1/26/17  1:48 PM.  If you have any questions, ask your nurse or doctor.               Stop taking medication(s)listed here:     Naproxen Sodium 220 MG capsule   Stopped by:  Dileep Mccall Jr., MD                      Your Updated Medication List          This list is accurate as of: 1/26/17  1:48 PM.  Always use your most recent med list.                acetaminophen 325 MG tablet   Commonly known as:  TYLENOL   Take 2 tablets by mouth Every 6 (Six) Hours As Needed for mild pain (1-3).       allopurinol 300 MG tablet   Commonly known as:  ZYLOPRIM       amLODIPine 5 MG tablet   Commonly known as:  NORVASC       aspirin 81 MG EC tablet       dabigatran etexilate 150 MG capsu   Commonly known as:  PRADAXA   Take 1 capsule by mouth Every 12 (Twelve) Hours.       furosemide 40 MG tablet   Commonly known as:  LASIX   Take 1 tablet by mouth Daily.       omeprazole 40 MG capsule   Commonly known as:  priLOSEC       potassium chloride 20 MEQ CR tablet   Commonly known as:  K-DUR,KLOR-CON       pravastatin 40 MG tablet   Commonly known as:  PRAVACHOL   Take 1 tablet by mouth Every Night.       PREDNISONE PO       sotalol 160 MG tablet   Commonly known as:  BETAPACE   Take 1 tablet by mouth Every 12 (Twelve) Hours.       tamsulosin 0.4 MG capsule 24 hr capsule   Commonly known as:  FLOMAX   Take 1 capsule by mouth 2 (Two) Times a Day.       tiotropium 18 MCG per inhalation capsule   Commonly known as:  SPIRIVA   Place 2 puffs into inhaler and inhale Daily.       traMADol 50 MG tablet   Commonly known as:  ULTRAM               You Were Diagnosed With        Codes Comments  "   Atrial fibrillation status post cardioversion    -  Primary ICD-10-CM: I48.91  ICD-9-CM: 427.31     Hypertension, essential     ICD-10-CM: I10  ICD-9-CM: 401.9       Instructions     None    Patient Instructions History      Upcoming Appointments     Visit Type Date Time Department    SAME DAY 1/26/2017  1:20 PM [facility]    OFFICE VISIT 1/30/2017  9:00 AM [facility]    FOLLOW UP 2/13/2017  1:15 PM [facility]    OFFICE VISIT 3/9/2017  9:35 AM [facility]      MyChart Signup     Our records indicate that you have declined [facility] [employer] signup. If you would like to sign up for [employer], please email [email or web address] or call [phone] to obtain an activation code.             Other Info from Your Visit           Your Appointments     Jan 30, 2017  9:00 AM EST   Office Visit with Herman Gonzalez MD   [facility] (--)    [address] [facility]. [address]  [facility] [id number] [phone]           Arrive 15 minutes prior to appointment.            Feb 13, 2017  1:15 PM EST   Follow Up with Sebastian Ho MD   [facility] (--)    [address] [facility] [address]  [place] [id number]   [phone]           Arrive 15 minutes prior to appointment.            Mar 09, 2017  9:35 AM EST   Office Visit with Dileep Mccall Jr., MD   [facility] (--)    [address] [address] [phone]           Arrive 15 minutes prior to appointment.              Allergies     Albuterol      Amiodarone      Biaxin [Clarithromycin]      Lipitor [Atorvastatin]  Myalgia      Reason for Visit     Follow-up hospital follow up      Vital Signs     Blood Pressure Pulse Temperature Height Weight Oxygen Saturation    120/70 (BP Location: Left arm, Patient Position: Sitting, Cuff Size: Adult) 66 97.5 °F (36.4 °C) (Oral) 69\" (175.3 cm) 240 lb 8 oz (109 kg) 94% "    Body Mass Index Smoking Status                35.52 kg/m2 Former Smoker          Problems and Diagnoses Noted     Atrial fibrillation status post cardioversion    -  Primary    High blood pressure

## 2017-01-26 NOTE — PROGRESS NOTES
Subjective   Kenan Garrison is a 71 y.o. male.   Follow-up from hospital status post atrial flutter with rapid ventricular response.  Rehospitalized with rapid heart rate feeling short of breath.  They were able to some difficulty Raynaud's heart rate he has had a prior cardiac ablation has seen Dr. lee for this past is indicated to him that he might need to have another ablation done a put him on sotalol with increased dose with his last hospital visit.  Notes some tiredness which may be just the hospital stay or side effects from the increased sotalol.  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  Overalls doing better.  History of Present Illness   Basically history of present also described as above not sure we have a complete discharge available yet from hospital overall is doing better heart rate control now roughly asymptomatic at present  CHF history also.  Does have chronic nasal O2.  Review of Systems   All other systems reviewed and are negative.      Objective   Vitals:    01/26/17 1325   BP: 120/70   Pulse: 66   Temp: 97.5 °F (36.4 °C)   SpO2: 94%   Weight: 240 lb 8 oz (109 kg)     Physical Exam   Constitutional: He appears well-developed and well-nourished.   Alert, in good mood does have nasal O2 chronically including today's visit.  Gives good history of recent hospital stay.  Artery has cardiologist follow-up scheduled   Cardiovascular: Normal rate, regular rhythm and normal heart sounds.    Pulmonary/Chest: Effort normal and breath sounds normal.   Neurological: He is alert.   Nursing note and vitals reviewed.      Lab Results   Component Value Date    INR 1.09 01/20/2017    INR 1.17 (H) 01/18/2017    INR 1.12 (H) 12/08/2016       Procedures    Assessment/Plan   1.  Atrial fib now controlled rate by auscultation he sounds like sinus rhythm at the moment.    2.  CHF controlled..    3.  Hypertension controlled plan continue present medication is tolerating sotalol I did ask that he  bring up with his cardiologist.  They thought this was sotalol as resource fatigue also little more time passes, seem bruised both rest and conditioning.  If does not resolve we will do further workup on patient is already been fairly well scrutinized in recent months.    Much of this encounter note is an electronic transcription/translation of spoken language to printed text.  The electronic translation of spoken language may permit erroneous, or at times, nonsensical words or phrases to be inadvertently transcribed.  Although I have reviewed the note for such errors, some may still exist.

## 2017-01-27 NOTE — TELEPHONE ENCOUNTER
Tasneem,   Could you please Dr. Dewitt message below about the Sotalol? And contact patient.    Thanks,   Anabelle

## 2017-01-27 NOTE — TELEPHONE ENCOUNTER
Pt has been called to schedule procedure.     malia Boateng mentioned that Dr. Dewitt put his Sotalol to 180. He states that this is making him very sleepy, tired he can seem to stay awake. Could you check on this for him, and call him back?    Thanks,   Anabelle

## 2017-01-27 NOTE — TELEPHONE ENCOUNTER
Please read message below. I did call pt and he is actually taking the sotalol 160 mg every 12 hrs not 180 mg. Can you please advise.....Tasneem

## 2017-01-31 PROBLEM — I48.91 A-FIB (HCC): Status: ACTIVE | Noted: 2017-01-01

## 2017-01-31 PROBLEM — I48.91 ATRIAL FIBRILLATION (HCC): Status: ACTIVE | Noted: 2017-01-01

## 2017-02-01 PROBLEM — I48.4 ATYPICAL ATRIAL FLUTTER (HCC): Status: ACTIVE | Noted: 2017-01-01

## 2017-02-01 NOTE — PLAN OF CARE
Problem: Patient Care Overview (Adult)  Goal: Plan of Care Review  Outcome: Ongoing (interventions implemented as appropriate)    02/01/17 1603   Patient Care Overview   Progress improving   Outcome Evaluation   Outcome Summary/Follow up Plan vitals stable. c/o of chronic pain. cardizem drip d/c started on esmolol infusion. 's. ablation tomorrow. will continue to monitor.    Coping/Psychosocial Response Interventions   Plan Of Care Reviewed With patient       Goal: Adult Individualization and Mutuality  Outcome: Ongoing (interventions implemented as appropriate)  Goal: Discharge Needs Assessment  Outcome: Ongoing (interventions implemented as appropriate)    Problem: Arrhythmia/Dysrhythmia (Symptomatic) (Adult)  Goal: Signs and Symptoms of Listed Potential Problems Will be Absent or Manageable (Arrhythmia/Dysrhythmia)  Outcome: Ongoing (interventions implemented as appropriate)

## 2017-02-01 NOTE — PLAN OF CARE
Problem: Patient Care Overview (Adult)  Goal: Plan of Care Review  Outcome: Ongoing (interventions implemented as appropriate)  Goal: Adult Individualization and Mutuality  Outcome: Ongoing (interventions implemented as appropriate)  Goal: Discharge Needs Assessment  Outcome: Ongoing (interventions implemented as appropriate)    Problem: Arrhythmia/Dysrhythmia (Symptomatic) (Adult)  Goal: Signs and Symptoms of Listed Potential Problems Will be Absent or Manageable (Arrhythmia/Dysrhythmia)  Outcome: Ongoing (interventions implemented as appropriate)

## 2017-02-01 NOTE — CONSULTS
Electrophysiology Hospital Consult            Patient Name: Kenan Garrison  Age/Sex: 71 y.o. male  : 1945  MRN: [id number]    Date of Admission: 2017  Date of Encounter Visit: 17  Encounter Provider: Lee Jung MD  Referring Provider: Herman Gonzalez, *  Place of Service: [facility] CARDIOLOGY  Patient Care Team:  Dileep Mccall Jr., MD as PCP - General  Dileep Mccall Jr., MD as PCP - Family Medicine      Subjective:   EP Consultation for: Atypical flutter    Chief Complaint: palpitations    History of Present Illness:  Kenan Garrison is a 71 y.o. male, followed by Dr. Pinzon as well as Dr. Primo Dewitt for history of RBBB and atrial fibrillation and atypical flutter/atrial tachycardia. He underwent catheter ablation on 16 which demonstrated a complex arrhythmia at that time that ultimately mapped to the upper RA septum. 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 He continued to have persistent tachycardia with HRs in the 120s and was started on Eliquis - later switched to Pradaxa for insurance coverage. On  underwent cardioversion to SR and was started on propafenone, however was unable to afford it. He was admitted on  for initiation of Sotalol. He was then admitted on  with atypical chest pain and cough. His ACE-I was stopped due to cough. He was also noted to be bradycardic (upper 40's). His sotalol was decreased and his metoprolol XL was stopped. He was discharged on  and approximately 5 hours discharge, started not feeling well. He checked his HR at home and found it to be elevated. He presented back to the emergency room and was again found to be in a tachycardic rhythm. His sotalol was increased to 160mg BID. He was evaluated by Dr. Dewitt and underwent successful  external cadioversion. He was discharged the following day and scheduled for an outpatient catheter ablation.     Patient presented to the emergency room yesterday afternoon with complaints of tachy palpitations. He was again found to be in atrial flutter with RBBB. He was given IV digoxin, PO lopressor, and started on a cardizem drip which is currently running at 15mg/hr. He has not missed any doses of his Dabigatran. This am while sitting at the side of the bed with PT his heart rate dropped to 40's but rebounded to 120's. His troponins have been negative and is not c/o chest pain or shortness of breath at this time.  He remains tachycardic with a HR of 120. He is scheduled for an ablation with Dr. Dewitt on 3/15/17.     Pos tachy, pos pang pos soa   Start suddenly   See above  Pos near syncope   I could retype all the above but i attest to it      Echo 11/2016  · All left ventricular wall segments contract normally.  · Left ventricular function is normal.  · Left ventricular diastolic dysfunction (grade I) consistent with impaired relaxation.  · Left atrial cavity size is borderline dilated.  · Mild mitral valve regurgitation is present  · Mild tricuspid valve regurgitation is present.      Stress 9/17/16  · Left ventricular ejection fraction is normal.  · Myocardial perfusion imaging indicates a normal myocardial perfusion study with no evidence of ischemia.  · Impressions are consistent with a low risk study.    Cath 2/28/16  FINDINGS:   1. The left ventricular pressure is 140 with the end-diastolic pressure of  approximately 18 mmHg. Aortic pressure is 134/70. No significant gradient  across the aortic valve. The LV systolic function is normal.   2. The right coronary artery is a small-to-medium caliber codominant vessel.   There is a stent in the proximal right coronary artery that is patent. Distal  to the stent there is a stepdown but no critical stenosis. The distal right  coronary artery itself is kind of  small caliber vessel.   3. The LAD and circumflex coronary arteries have separate ostia. There is  calcific coronary atherosclerosis. There is a stent to the left circumflex  coronary artery that is patent. No significant stenosis noted. The LAD has a  separate ostium. The proximal LAD is a calcified tortuous vessel around 30%  plaque at 2 angulations. This is not critical. The distal LAD has another focal  area of 50% stenosis. This is not critical as well. The LAD itself is   calcified with normal flow otherwise. 4. The left ventricle is hyperdynamic.   5. Patent previously placed coronary stents.   6. Hyperdynamic left ventricle normal LV systolic function.   7. Mildly elevated left ventricular end-diastolic pressure.   8. Ejection fraction is approximately 70%.   9. The patient's stent in the right coronary artery and the circumflex coronary  artery mild to moderate plaque. There is no significant in the LAD as  mentioned above.   10. The mean right atrial pressure was approximately 8 mmHg.   11. The right ventricular pressure is 52/8 mm.   12. The pulmonary capillary wedge pressure is kind of elevated.   13. The mean pulmonary capillary wedge pressure is 28 mm.   14. Cardiac output is 5.30. Cardiac index is 2.43.   15. The right atrial saturation was 52.   16. Pulmonary artery saturation was around 60.   17. Cardiac index is 2.43.   18. Sestamibi vascular resistance 1328.       IMPRESSION: Elevated pulmonary capillary wedge pressure, probably from  diastolic dysfunction and hyperdynamic left ventricle.         Past Medical History:  Past Medical History   Diagnosis Date   • Abnormal electrocardiogram    • Atrial fibrillation    • BPH (benign prostatic hyperplasia)    • Bundle branch block, left hemiblock    • Cancer      bladder   • Cardiovascular risk factor      Diabetes.  Positive hypertension.  Nonsmoker.  Positive family history of early CAD.  Positive hyperlipidemia   • Chest pain    • Chest pain    • CHF  (congestive heart failure)      per patient record   • Coronary artery disease    • Dyslipidemia    • Fatigue    • Generalized osteoarthritis of unspecified site    • H/O cardiac radiofrequency ablation    • Hepatitis    • Hyperlipidemia    • Hypertension    • Jaundice      age 18   • Mitral valve insufficiency    • Obesity    • Pneumonia    • Right bundle branch block    • Sleep apnea    • Urinary stone        Past Surgical History   Procedure Laterality Date   • Knee arthroscopy Right    • Thromboendarterectomy       CAROTID   • Coronary angioplasty with stent placement     • Tonsillectomy     • Bladder surgery       cancer  approx 3yrs ago   • Colonoscopy     • Endoscopy N/A 7/26/2016     Procedure: ESOPHAGOGASTRODUODENOSCOPY with esophageal dilation #48 and #52 Mckeon dilators;  Surgeon: Sebastian Ho MD;  Location: [facility] ENDOSCOPY;  Service:    • Coronary angioplasty with stent placement     • Cardiac electrophysiology procedure N/A 10/26/2016     Procedure: Ablation atrial fibrillation;  Surgeon: Primo Dewitt MD;  Location: [facility] CATH INVASIVE LOCATION;  Service:    • Cardiac ablation     • Tonsillectomy and adenoidectomy         Home Medications:   Prescriptions Prior to Admission   Medication Sig Dispense Refill Last Dose   • acetaminophen (TYLENOL) 325 MG tablet Take 2 tablets by mouth Every 6 (Six) Hours As Needed for mild pain (1-3).  0 Past Week at Unknown time   • allopurinol (ZYLOPRIM) 300 MG tablet Take 300 mg by mouth At Night As Needed.   Past Week at Unknown time   • aspirin 81 MG EC tablet Take 81 mg by mouth Every Night.   1/30/2017 at 2100   • pravastatin (PRAVACHOL) 40 MG tablet Take 1 tablet by mouth Every Night. 30 tablet 2 1/30/2017 at 2100   • tiotropium (SPIRIVA) 18 MCG per inhalation capsule Place 2 puffs into inhaler and inhale Daily. 1 each 0 1/31/2017 at Unknown time   • traMADol (ULTRAM) 50 MG tablet Take 50 mg by mouth Every 6 (Six) Hours As Needed for moderate pain (4-6).    Past Week at Unknown time   • amLODIPine (NORVASC) 5 MG tablet Take 1 tablet by mouth Daily. 90 tablet 3 1/31/2017 at 0900   • dabigatran etexilate (PRADAXA) 150 MG capsu Take 1 capsule by mouth Every 12 (Twelve) Hours. 180 capsule 1 1/31/2017 at 0900   • furosemide (LASIX) 40 MG tablet Take 1 tablet by mouth Daily. (Patient taking differently: Take 40 mg by mouth 3 (Three) Times a Week.) 90 tablet 1 1/31/2017 at 0900   • omeprazole (PriLOSEC) 40 MG capsule Take 40 mg by mouth Daily.   1/31/2017 at 0900   • potassium chloride (K-DUR,KLOR-CON) 20 MEQ CR tablet Take 20 mEq by mouth 3 (Three) Times a Week.   1/31/2017 at 0900   • PREDNISONE PO Take 20 mg by mouth Daily.   1/31/2017 at 0900   • sotalol (BETAPACE) 160 MG tablet Take 1 tablet by mouth Every 12 (Twelve) Hours. 60 tablet 5 1/31/2017 at 0900   • tamsulosin (FLOMAX) 0.4 MG capsule 24 hr capsule Take 1 capsule by mouth 2 (Two) Times a Day. 180 capsule 1 1/31/2017 at 0900       Allergies:  Allergies   Allergen Reactions   • Albuterol    • Amiodarone    • Biaxin [Clarithromycin]    • Lipitor [Atorvastatin] Myalgia       Past Social History:  Social History     Social History   • Marital status:      Spouse name: N/A   • Number of children: N/A   • Years of education: N/A     Occupational History   • Not on file.     Social History Main Topics   • Smoking status: Former Smoker     Packs/day: 3.50     Years: 22.00     Types: Cigarettes     Quit date: 1987   • Smokeless tobacco: Never Used   • Alcohol use No   • Drug use: No   • Sexual activity: Defer     Other Topics Concern   • Not on file     Social History Narrative       Past Family History:  Family History   Problem Relation Age of Onset   • Coronary artery disease Other    • Heart disease Mother        Review of Systems: All systems reviewed. Pertinent positives identified in HPI. All other systems are negative.     14 point ROS was performed and is negative except as outlined in HPI.     Objective:      Objective:  Vital Signs (last 24 hours)       01/31 0700  -  02/01 0659 02/01 0700  -  02/01 0733   Most Recent    Temp (°F) 97.4 -  98.1       98.1 (36.7)    Heart Rate 117 -  (!)127       120    Resp 16 -  21       20    BP 91/73 -  150/96       128/90    SpO2 (%) 92 -  98       96        Temp:  [97.4 °F (36.3 °C)-98.1 °F (36.7 °C)] 98.1 °F (36.7 °C)  Heart Rate:  [120-127] 122  Resp:  [16-21] 18  BP: ()/() 98/71  Body mass index is 34.7 kg/(m^2).        Physical Exam:   Physical Exam   Constitutional: He is oriented to person, place, and time.   HENT:   Head: Normocephalic and atraumatic.   Eyes: Right eye exhibits no discharge. Left eye exhibits no discharge.   Neck: No JVD present. No thyromegaly present.   Cardiovascular: Regular rhythm.  Tachycardia present.  Exam reveals no gallop and no friction rub.    No murmur heard.  Pulmonary/Chest: Effort normal and breath sounds normal. He has no rales.   Abdominal: Soft. Bowel sounds are normal. There is no tenderness.   Musculoskeletal: Normal range of motion. He exhibits no edema or deformity.   Neurological: He is alert and oriented to person, place, and time. He exhibits normal muscle tone.   Skin: Skin is warm and dry. No erythema.   Psychiatric: He has a normal mood and affect. His behavior is normal. Thought content normal.        Labs:   Lab Review:       Results from last 7 days  Lab Units 02/01/17  0617 01/31/17 1958 01/31/17  1422   SODIUM mmol/L 142 145 145   POTASSIUM mmol/L 4.0 3.9 3.5   CHLORIDE mmol/L 103 104 106   TOTAL CO2 mmol/L 23.4 23.0 25.7   BUN mg/dL 14 13 13   CREATININE mg/dL 0.92 0.96 0.98   GLUCOSE mg/dL 93 112* 110*   CALCIUM mg/dL 9.3 9.4 9.1   AST (SGOT) U/L  --   --  10   ALT (SGPT) U/L  --   --  17       Results from last 7 days  Lab Units 02/01/17  0617 02/01/17  0008 01/31/17 1958   TROPONIN T ng/mL <0.010 <0.010 <0.010       Results from last 7 days  Lab Units 02/01/17  0617   WBC 10*3/mm3 12.02*   HEMOGLOBIN  g/dL 14.5   HEMATOCRIT % 45.8   PLATELETS 10*3/mm3 164       Results from last 7 days  Lab Units 02/01/17 0617   INR  1.32*       Results from last 7 days  Lab Units 02/01/17 0617   CHOLESTEROL mg/dL 169       Results from last 7 days  Lab Units 02/01/17 0617   MAGNESIUM mg/dL 2.3       Results from last 7 days  Lab Units 02/01/17 0617   CHOLESTEROL mg/dL 169   TRIGLYCERIDES mg/dL 293*   HDL CHOL mg/dL 31*       Results from last 7 days  Lab Units 02/01/17 0617   PROBNP pg/mL 1338.0*           Results from last 7 days  Lab Units 02/01/17 0617   TSH mIU/mL 2.260           Imaging:     Imaging Results (most recent)     Procedure Component Value Units Date/Time    XR Chest 2 View [[id number]] Collected:  01/31/17 1439     Updated:  01/31/17 1444    Narrative:       XR CHEST 2 VW-     HISTORY: Male who is 71 years-old,  atrial fibrillation     TECHNIQUE: Frontal and lateral views of the chest     COMPARISON: 1/17/2017     FINDINGS: Heart size is borderline. Aorta is calcified. Pulmonary  vasculature is unremarkable. No focal pulmonary consolidation, pleural  effusion, or pneumothorax. No acute osseous process.       Impression:       No focal pulmonary consolidation. Borderline heart size.  Follow-up as clinical indications persist.     This report was finalized on 1/31/2017 2:41 PM by Dr. Vikas Keller MD.           PREVIOUS EKG (s/p cardioversion 1/24)      EKG:         I personally viewed and interpreted the patient's EKG/Telemetry data.    Assessment:     Principal Problem:    A-fib  Active Problems:    Atrial fibrillation    Atypical atrial flutter        Plan:       this is obvious   Massive la ablation in Nov  Now has had two cardioversion for left atrial or atypical flutter  Now back in it  Has missed no doses of dabigatran   Will need redo ablation   Will do in am  Risks discussed but he's screwed without treatment     Thank you for allowing me to participate in the care of Kenan Garrison. Feel free  to contact me directly with any further questions or concerns.    Lee Jung MD  [facility]  02/01/17  2:14 PM

## 2017-02-01 NOTE — PLAN OF CARE
Problem: Patient Care Overview (Adult)  Goal: Plan of Care Review  Outcome: Ongoing (interventions implemented as appropriate)    02/01/17 0040 02/01/17 0621   Patient Care Overview   Progress --  no change   Outcome Evaluation   Outcome Summary/Follow up Plan --  VSS. C/o chronic back and hip pain, responds to home tramadol. Chest pain ranging from 2/10 to 4/10. 3L O2 on - baseline. Pm ASA given. EKG confirmed rhythm as SVT with RBBB and LPFB. HR stayed in 120s, not responding to cardizem drip titrated up to 15 or one time dose of dig. BP dropped slightly but SBP never below 90, @0600 128/90. Multiple q6 trop neg.   Coping/Psychosocial Response Interventions   Plan Of Care Reviewed With patient --        Goal: Adult Individualization and Mutuality  Outcome: Ongoing (interventions implemented as appropriate)  Goal: Discharge Needs Assessment  Outcome: Ongoing (interventions implemented as appropriate)    Problem: Arrhythmia/Dysrhythmia (Symptomatic) (Adult)  Goal: Signs and Symptoms of Listed Potential Problems Will be Absent or Manageable (Arrhythmia/Dysrhythmia)  Outcome: Ongoing (interventions implemented as appropriate)

## 2017-02-01 NOTE — PROGRESS NOTES
Discharge Planning Assessment  [facility]     Patient Name: Kenan Garrison  MRN: [id number]  Today's Date: 2/1/2017    Admit Date: 1/31/2017          Discharge Needs Assessment       02/01/17 1107    Living Environment    Lives With spouse    Living Arrangements apartment    Provides Primary Care For no one    Quality Of Family Relationships supportive    Able to Return to Prior Living Arrangements yes    Living Arrangement Comments pt resides with his wife in a first floor apartment 1 step to enter    Discharge Needs Assessment    Concerns To Be Addressed denies needs/concerns at this time    Equipment Currently Used at Home oxygen   Home O2 from [employer]    Transportation Available family or friend will provide    Discharge Disposition home or self-care    Discharge Planning Comments Pt plans home; denies needs            Discharge Plan       02/01/17 1107    Case Management/Social Work Plan    Plan Pt plans home; denies needs    Patient/Family In Agreement With Plan yes    Additional Comments Checked IMM. Met with pt bedside. Confirmed facesheet correct. Pt lives with his wife. Has Home O2 with [employer]; no DME used. Pt reports he is able to get medications [facility], no issues. Pt reports he has never had HH or SNF, denies needs. Pt plans home with assist from wife. CCP to follow....ISIDORO Burns        Discharge Placement     No information found                Demographic Summary       02/01/17 1106    Referral Information    Admission Type inpatient    Reason For Consult discharge planning    Record Reviewed medical record    Contact Information    Permission Granted to Share Information With facility ;family/designee            Functional Status       02/01/17 1106    Functional Status Current    Ambulation 0-->independent    Transferring 0-->independent    Toileting 0-->independent    Bathing 0-->independent    Dressing 0-->independent    Eating 0-->independent    Communication  0-->understands/communicates without difficulty    Swallowing (if score 2 or more for any item, consult Rehab Services) 0-->swallows foods/liquids without difficulty    Functional Status Prior    Ambulation 0-->independent    Transferring 0-->independent    Toileting 0-->independent    Bathing 0-->independent    Dressing 0-->independent    Eating 0-->independent    Communication 0-->understands/communicates without difficulty            Psychosocial     None            Abuse/Neglect     None            Legal     None            Substance Abuse     None            Patient Forms       02/01/17 1110    Patient Forms    Provider Choice List Delivered    Delivered to Patient    Method of delivery In person          [place]

## 2017-02-01 NOTE — PROGRESS NOTES
[facility]  Cardiology Progress Note    Patient Identification:  Name:Kenan Garrison  Age:71 y.o.  Sex: male  :  1945  MRN: [id number]             Length of Stay: 1    Follow up for:  ISSA galvez with RVR    Interval History :      Feeling better.  Episode of set up in bed had dizziness thought was going to pass out and was told pulse rate slower; resolved spontaneously.  He is feeling better now.  No further dizziness.  No chest pain at this time.  Pulse rate is still a bit fast, 120s.--Will stop Cardizem gtt and switch to Brevibloc gtt    Per note Dr. Jung today : says:   Will need redo ablation   Will do in am      HPI      Past Medical History   Diagnosis Date   • Abnormal electrocardiogram    • Atrial fibrillation    • BPH (benign prostatic hyperplasia)    • Bundle branch block, left hemiblock    • Cancer      bladder   • Cardiovascular risk factor      Diabetes.  Positive hypertension.  Nonsmoker.  Positive family history of early CAD.  Positive hyperlipidemia   • Chest pain    • Chest pain    • CHF (congestive heart failure)      per patient record   • Coronary artery disease    • Dyslipidemia    • Fatigue    • Generalized osteoarthritis of unspecified site    • H/O cardiac radiofrequency ablation    • Hepatitis    • Hyperlipidemia    • Hypertension    • Jaundice      age 18   • Mitral valve insufficiency    • Obesity    • Pneumonia    • Right bundle branch block    • Sleep apnea    • Urinary stone        Scheduled Meds:    Current Facility-Administered Medications:   •  acetaminophen (TYLENOL) tablet 650 mg, 650 mg, Oral, Q6H PRN, Vikas Tom Jr., MD  •  allopurinol (ZYLOPRIM) tablet 300 mg, 300 mg, Oral, Nightly PRN, Vikas Tom Jr., MD  •  aspirin EC tablet 81 mg, 81 mg, Oral, Nightly, Vikas Tom Jr., MD, 81 mg at 17 2331  •  esmolol (BREVIBLOC) infusion 10 mg/mL,  mcg/kg/min, Intravenous, Continuous, RADHA Pappas  •   "pantoprazole (PROTONIX) EC tablet 40 mg, 40 mg, Oral, Q AM, Vikas Tom Jr., MD, 40 mg at 02/01/17 0818  •  potassium chloride (KLOR-CON) packet 10 mEq, 10 mEq, Oral, Daily, Vikas Tom Jr., MD, 10 mEq at 02/01/17 0900  •  pravastatin (PRAVACHOL) tablet 40 mg, 40 mg, Oral, Nightly, Vikas Tom Jr., MD, 40 mg at 01/31/17 2331  •  predniSONE (DELTASONE) tablet 20 mg, 20 mg, Oral, Daily With Breakfast, Vikas Tom Jr., MD, 20 mg at 02/01/17 0818  •  sodium chloride 0.9 % flush 1-10 mL, 1-10 mL, Intravenous, PRN, Vikas Tom Jr., MD  •  sodium chloride 0.9 % flush 10 mL, 10 mL, Intravenous, PRN, Tonio Victoria MD  •  sotalol (BETAPACE) tablet 160 mg, 160 mg, Oral, Q12H, Vikas Tom Jr., MD, 160 mg at 02/01/17 0818  •  tamsulosin (FLOMAX) 24 hr capsule 0.4 mg, 0.4 mg, Oral, Daily, Vikas Tom Jr., MD, 0.4 mg at 02/01/17 0818  •  tiotropium (SPIRIVA) 18 MCG per inhalation capsule 1 capsule, 1 capsule, Inhalation, Daily - RT, Vikas Tom Jr., MD, 1 capsule at 02/01/17 0904  •  traMADol (ULTRAM) tablet 50 mg, 50 mg, Oral, Q6H PRN, Vikas Tom Jr., MD, 50 mg at 02/01/17 1111    All systems were reviewed and negative except for:  Constitution:  positive for fatigue  Respiratory: positive for  shortness of air  Cardiovascular: positive for  chest pressure / pain, at rest, palpitations and dizziness.  No chest pain now.    Visit Vitals   • BP 98/71 (BP Location: Right arm, Patient Position: Sitting)   • Pulse (!) 122   • Temp 98.1 °F (36.7 °C) (Oral)   • Resp 18   • Ht 69\" (175.3 cm)   • Wt 235 lb (107 kg)   • SpO2 92%   • BMI 34.7 kg/m2        No intake or output data in the 24 hours ending 02/01/17 1514       Wt Readings from Last 1 Encounters:   01/31/17 1350 235 lb (107 kg)       Physical Examination: By     Physical Exam    General: No acute distress.    Skin: Warm and dry, no diaphoresis noted   HEENT: No ptosis;  oral mucosa moist   Neck: Supple; no carotid bruits; no " JVD, Trachea mid line   Heart: S1S2  regular rate and rhythm;  Softy systolic murmurs; no gallop or rub appreciated, No thrills palpable   Chest: Respirations unlabored at rest, bilateral breath sounds have good air entry; no  wheezes auscultated.     Abdomen: Soft, non-tender, non-distended, positive bowel sounds  ,No aneurysms palpable   Extremities: Bilateral lower extremities have no pre-tibial pitting edema; Radials are palpable   Neurological: Alert and oriented ; no new motor deficits,       Lab Review:  Personally reviewed the labs, radiology imaging and other cardiac procedures.       Results from last 7 days  Lab Units 02/01/17  0617  01/31/17  1422   SODIUM mmol/L 142  < > 145   POTASSIUM mmol/L 4.0  < > 3.5   CHLORIDE mmol/L 103  < > 106   TOTAL CO2 mmol/L 23.4  < > 25.7   BUN mg/dL 14  < > 13   CREATININE mg/dL 0.92  < > 0.98   CALCIUM mg/dL 9.3  < > 9.1   BILIRUBIN mg/dL  --   --  0.6   ALK PHOS U/L  --   --  58   ALT (SGPT) U/L  --   --  17   AST (SGOT) U/L  --   --  10   GLUCOSE mg/dL 93  < > 110*   < > = values in this interval not displayed.    Results from last 7 days  Lab Units 02/01/17  0617 02/01/17  0008 01/31/17  1958   TROPONIN T ng/mL <0.010 <0.010 <0.010     @[id number]@    Results from last 7 days  Lab Units 02/01/17  0617 01/31/17  1422   WBC 10*3/mm3 12.02* 13.05*   HEMOGLOBIN g/dL 14.5 13.7   HEMATOCRIT % 45.8 42.3   PLATELETS 10*3/mm3 164 164       Results from last 7 days  Lab Units 02/01/17  0617   INR  1.32*       Estimated Creatinine Clearance: 91.3 mL/min (by C-G formula based on Cr of 0.92).    I personally viewed and interpreted the patient's EKG/Telemetry data    ECG: sinus tachy    Echocardiogram:         Patient Active Problem List   Diagnosis   • Bundle branch block, right   • Coronary artery disease   • Awareness of heartbeats   • HLD (hyperlipidemia)   • BP (high blood pressure)   • Loud breathing during sleep   • Tachycardia, paroxysmal   • Greater trochanteric bursitis  of right hip   • Chest discomfort   • Ankle edema   • Dysphagia   • Chronic congestive heart failure   • Chest pain at rest   • Precordial chest pain   • Paroxysmal a-fib   • Community acquired bacterial pneumonia   • Acute respiratory failure with hypoxia   • Interstitial lung disease   • Acute and chronic respiratory failure with hypoxia   • Diastolic CHF, acute on chronic   • AF (atrial fibrillation)   • Precordial pain   • Atrial flutter with rapid ventricular response   • A-fib   • Atrial fibrillation   • Atypical atrial flutter       Assessment :  Atrial flutter with RVR, right bundle branch block  Typical flutter/atrial tachycardia  Bradycardia, one episode this morning witnessed by EP specialist Dr. Jung at , per note, then rebounded to 120s.No complaint of chest pain or shortness of breath at that time.  Tachycardia palpitations  Takes anticoagulation: Takes Pradaxa-on hold  H/o Off ACE inhibitor stopped due to cough    Troponins negative.     On Cardizem drip. ON aspirin, dig, potassium, Pravachol, sotalol) -off Pradaxa     Recommendations: Still has elevated pulse rate 120s, will stop cardizem gtt and switch to Brevibloc drip with HR parameters for rate control    Will defer to Dr. Jung: Will need redo ablation   Will do in am    Will check in a.m. Repeat BMP, mag, troponin and EKG    Discussed with and ongoing medical management by Dr. ELKE Gonzalez-Ena Gonzalez MD, [facility]  2/1/20173:14 PM      Patient is personally examined by me. All labs, radiology reports and cardiac procedures are reviewed and or obtained by me. Asessment and plan is by me.-----Ever Gonzalez MD

## 2017-02-02 NOTE — PLAN OF CARE
Problem: Patient Care Overview (Adult)  Goal: Plan of Care Review  Outcome: Ongoing (interventions implemented as appropriate)    02/02/17 6319   Outcome Evaluation   Outcome Summary/Follow up Plan transfer to [facility]       Goal: Adult Individualization and Mutuality  Outcome: Ongoing (interventions implemented as appropriate)  Goal: Discharge Needs Assessment  Outcome: Ongoing (interventions implemented as appropriate)    Problem: Arrhythmia/Dysrhythmia (Symptomatic) (Adult)  Goal: Signs and Symptoms of Listed Potential Problems Will be Absent or Manageable (Arrhythmia/Dysrhythmia)  Outcome: Ongoing (interventions implemented as appropriate)

## 2017-02-02 NOTE — PLAN OF CARE
Problem: Patient Care Overview (Adult)  Goal: Plan of Care Review  Outcome: Ongoing (interventions implemented as appropriate)    02/01/17 2250 02/02/17 0415   Patient Care Overview   Progress --  no change   Outcome Evaluation   Outcome Summary/Follow up Plan --  VSS. C/o chronic pain controlled with tramadol. Esmolol stopped due to SBP in 70s. HR remained in 120s with drip running @ 100. When BP came up enough to restart, pt refused. Plan for ablation today.   Coping/Psychosocial Response Interventions   Plan Of Care Reviewed With patient --        Goal: Adult Individualization and Mutuality  Outcome: Ongoing (interventions implemented as appropriate)  Goal: Discharge Needs Assessment  Outcome: Ongoing (interventions implemented as appropriate)    Problem: Arrhythmia/Dysrhythmia (Symptomatic) (Adult)  Goal: Signs and Symptoms of Listed Potential Problems Will be Absent or Manageable (Arrhythmia/Dysrhythmia)  Outcome: Ongoing (interventions implemented as appropriate)

## 2017-02-02 NOTE — PERIOPERATIVE NURSING NOTE
Post sheath bleeding precautions and movement restrictions reviewed with pt.  Verbalized understanding.

## 2017-02-02 NOTE — ANESTHESIA PROCEDURE NOTES
Airway  Urgency: elective    Date/Time: 2/2/2017 11:06 AM  Difficult airway    General Information and Staff    Patient location during procedure: OR  Anesthesiologist: CARA ROSA    Indications and Patient Condition  Indications for airway management: airway protection    Preoxygenated: yes  Mask difficulty assessment: 1 - vent by mask    Final Airway Details  Final airway type: endotracheal airway      Successful airway: ETT  Cuffed: yes   Successful intubation technique: video laryngoscopy  Facilitating devices/methods: intubating stylet  Endotracheal tube insertion site: oral  Blade: Radha  Blade size: #4  ETT size: 8.0 mm  Cormack-Lehane Classification: grade III - view of epiglottis only  Placement verified by: chest auscultation and capnometry   Measured from: lips  ETT to lips (cm): 21  Number of attempts at approach: 2

## 2017-02-02 NOTE — ANESTHESIA PREPROCEDURE EVALUATION
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), sleep apnea,   (-) COPD, asthma    PE comment: nonlabored  Cardiovascular - normal exam  (+) hypertension, valvular problems/murmurs MR, CAD, dysrhythmias Atrial Fib, Atrial Flutter, CHF,   (-) past MI, angina    Rhythm: regular  Rate: normal    Neuro/Psych- negative ROS  (-) seizures, TIA, CVA  GI/Hepatic/Renal/Endo    (+) obesity,  hepatitis, liver disease,   (-) GERD, renal disease, diabetes, hypothyroidism, hyperthyroidism    Musculoskeletal     Abdominal    Substance History      OB/GYN          Other   (+) arthritis      Other Comment: Bladder CA;  BPH                        Anesthesia Plan    ASA 3     general     intravenous induction   Anesthetic plan and risks discussed with patient.

## 2017-02-03 NOTE — DISCHARGE SUMMARY
Date of Discharge:  2/3/2017    Discharge Diagnosis:   -Atrial fibrillation/ atrial flutter with rapid ventricular response w/ associated palpitations. Underwent AF ablation, on 2/2/2017 by Dr Lee Jung. Takes rate control, metopolol. Stopped rythmol.  -Chest pains; thought GI in origin and to undergo outpatient GI w/u by  Dr Ricketts and has already scheduled appointment. Takes AC.   -Takes anticoagulation  -Chronic Right bundle branch block  -Atrial fibrillation,   -Atypical flutter/atrial tachycardia,   -Status successful post cardiac catheter ablation 11/7/16, successful,   -h/o Bradycardia,   -Diabetes, controlled.  Hg A1C 5.30  -Hypertension, controlled  -Hyperlipidemia  Elevated trig. Low HDL, LDL 79  -H/o ACE-I cough. Stopped ACE-I medicine and cough went away  -EF normal Has diastolic dysfunction by echocard w/ dop. 11/7/2017       Presenting Problem/History of Present Illness  A-fib [I48.91]  Atrial fibrillation, unspecified type [I48.91]  Atrial fibrillation, unspecified type [I48.91]        Hospital Course  Patient is a 71 y.o. male ,known by our service Dr. ELKE Gonzalez, with a history of right bundle branch block, atrial fibrillation, atypical flutter/atrial tachycardia, status successful post cardiac catheter ablation 11/7/16, successful, the cardia, diabetes, hypertension, hyperlipidemia, who presents for complaint of palpitations. He states was in his usual state of health, has been having palpitations, intermittent brief tachycardia, then sustaining on morning prior to admission pulse rate 120s, as high as up to 150s at home when he checked his pulse with associated palpitations. He has ongoing chest pains, described as epigastric, pressure, pain level 4/2, no pain now. Comes and goes, no change in pattern as what has been having; no worsening, last follow up for GI workup next month for upper GI workup. Thought chest pain was noncardiac. Followed by Dr. Ho. No associated nausea,  diaphoresis. Has pains right shoulder. No worse with taking deep breath, movement, position, palpation, eating. No belching burping. No swelling. Weight fluctuates, most recent to 30 pounds. States has been taking his sotalol (60 mg by mouth twice daily, Pradaxa 150 mg by mouth twice daily. With no missed doses of both medicines. No orthopnea or PND. He wears nasal cannula O2 at home at night.. Last seen when in the hospital recently, by EP specialist, Dr. Dewitt thought back in atypical atrial flutter. At that time thought, sotalol has not been effective at maintaining sinus rhythm, and encourage toward another ablation.  Patient was to undego undergo ablation March 15.   He heart rate was controlled during hospital stay. He did undergo on 2/2: AF ablation Comprehensive EPS, LA pacing and sensing, 3Dmapping Redo pulmonary vein isolation   Linear ablation of the LA roof which is extra ablation outside the PVs for ablation of AF  ReDo Mitral isthmus ablation for MI flutter, per Dr Lee Jung. Since then, has remained in sinus rhythm. He is feeling better and has less fatigue. Counseling for dietary modification  For elevated trig. Low HDL. Optimal for low LDL     At near discharge, he is is feeling fine and is asking and wanting to go when ready. Denies cheat pain. No orthopnea or PND. No palpitations, dizziness. No reported LOC. No swelling. Taking PO medicines. Ambulating in nurse desk area. Denies any bleeding. BP acceptable. Pulse rate  60s to 80s.   Telem. reports sinus, no ectopy.     Awaiting ok can go from cardiology standpoint Dr Mcfarlane, await for ok from EP Dr Lee Jung     Labs: Troponin less than 0.010. ProBNP 1338   Hg 12.1 creat 1.05  TSH normal. LDL  79 tot chol. 169 trig 293      Procedures Performed  Procedure(s):  Ablation atrial flutter  Ablation atrial fibrillation       Consults:   Consults     Date and Time Order Name Status Description    1/31/2017 5052 Inpatient Consult to  Cardiology      1/31/2017 1457 STEPHANIES (on-call MD unless specified) Completed     1/22/2017 0910 Inpatient Consult to Cardiac Electrophysiologist Completed     1/20/2017 0012 GONZALO (on-call MD unless specified) Completed     1/17/2017 1532 Inpatient Consult to Gastroenterology Completed     1/17/2017 0655 GONZALO (on-call MD unless specified) Completed           Pertinent Test Results:      Results from last 7 days  Lab Units 02/03/17 0446 01/31/17  1422   SODIUM mmol/L 139  < > 145   POTASSIUM mmol/L 4.3  < > 3.5   CHLORIDE mmol/L 101  < > 106   TOTAL CO2 mmol/L 25.2  < > 25.7   BUN mg/dL 15  < > 13   CREATININE mg/dL 1.05  < > 0.98   CALCIUM mg/dL 9.0  < > 9.1   BILIRUBIN mg/dL  --   --  0.6   ALK PHOS U/L  --   --  58   ALT (SGPT) U/L  --   --  17   AST (SGOT) U/L  --   --  10   GLUCOSE mg/dL 100*  < > 110*   < > = values in this interval not displayed.    Results from last 7 days  Lab Units 02/02/17  0642 02/01/17  0617 02/01/17  0008   TROPONIN T ng/mL <0.010 <0.010 <0.010     @LABRCNTbnp@    Results from last 7 days  Lab Units 02/03/17 0446 02/01/17  0617 01/31/17  1422   WBC 10*3/mm3 13.15* 12.02* 13.05*   HEMOGLOBIN g/dL 12.1* 14.5 13.7   HEMATOCRIT % 38.6* 45.8 42.3   PLATELETS 10*3/mm3 146 164 164       Results from last 7 days  Lab Units 02/03/17 0446 02/01/17 0617   INR  1.22* 1.32*       Results from last 7 days  Lab Units 02/02/17  0642   MAGNESIUM mg/dL 2.4       Results from last 7 days  Lab Units 02/01/17 0617   CHOLESTEROL mg/dL 169   TRIGLYCERIDES mg/dL 293*   HDL CHOL mg/dL 31*       EKG 2/3    EKG 2/2      EKG 2/1        Ejection Fraction LV ef normal 11/7/2016  · ) All left ventricular wall segments contract normally.  · Left ventricular function is normal.  · Left ventricular diastolic dysfunction (grade I) consistent with impaired relaxation.  · Left atrial cavity size is borderline dilated.  · Mild mitral valve regurgitation is present  Mild tricuspid valve regurgitation is present.    Nuclear  Stress Test: 9/15/2016  · Left ventricular ejection fraction is normal.  · Myocardial perfusion imaging indicates a normal myocardial perfusion study with no evidence of ischemia.  · Impressions are consistent with a low risk study.      Cardiac cath. report  Date of Catheterization: 01/11/2016  ________________________________________________________________________     PROCEDURES PERFORMED:  1. Left heart catheterization.  2. Selective coronary angiography.  3. Left ventricular angiography.  4. Right heart catheterization with hemodynamic measurements.     INTERVENTIONALIST: Herman Gonzalez MD      INDICATIONS: Ongoing chest pains and hypoxemia. To rule out myocardial   ischemia and for right and left heart cardiac cath to evaluate cause of the   congestive heart failure.      CONSENT: The procedure, risks, benefits, and all of the complications were  explained in detail to the patient. Informed consent was obtained.      TECHNIQUE: Access was obtained with a 6-Ecuadorean sheath in the right femoral  artery and size 6-Ecuadorean sheath in the right femoral artery and selective  coronary angiography of the left coronary artery was performed with 6-Ecuadorean  Marsha left #4 diagnostic catheter left coronary angiography. Right coronary  artery angiography was performed with a Marsha right #4 catheter. LV  angiography was performed with a pigtail catheter.      The patient also received a 7-Ecuadorean sheath in the right femoral vein and a  7-Ecuadorean sheath in the right femoral vein and right heart catheterization and  hemodynamics were performed with a [place]-Twila catheter.      FINDINGS:   1. The left ventricular pressure is 140 with the end-diastolic pressure of  approximately 18 mmHg. Aortic pressure is 134/70. No significant gradient  across the aortic valve. The LV systolic function is normal.   2. The right coronary artery is a small-to-medium caliber codominant vessel.   There is a stent in the proximal right coronary  artery that is patent. Distal  to the stent there is a stepdown but no critical stenosis. The distal right  coronary artery itself is kind of small caliber vessel.   3. The LAD and circumflex coronary arteries have separate ostia. There is  calcific coronary atherosclerosis. There is a stent to the left circumflex  coronary artery that is patent. No significant stenosis noted. The LAD has a  separate ostium. The proximal LAD is a calcified tortuous vessel around 30%  plaque at 2 angulations. This is not critical. The distal LAD has another focal  area of 50% stenosis. This is not critical as well. The LAD itself is   calcified with normal flow otherwise. 4. The left ventricle is hyperdynamic.   5. Patent previously placed coronary stents.   6. Hyperdynamic left ventricle normal LV systolic function.   7. Mildly elevated left ventricular end-diastolic pressure.   8. Ejection fraction is approximately 70%.   9. The patient's stent in the right coronary artery and the circumflex coronary  artery mild to moderate plaque. There is no significant in the LAD as  mentioned above.   10. The mean right atrial pressure was approximately 8 mmHg.   11. The right ventricular pressure is 52/8 mm.   12. The pulmonary capillary wedge pressure is kind of elevated.   13. The mean pulmonary capillary wedge pressure is 28 mm.   14. Cardiac output is 5.30. Cardiac index is 2.43.   15. The right atrial saturation was 52.   16. Pulmonary artery saturation was around 60.   17. Cardiac index is 2.43.   18. Sestamibi vascular resistance 1328.      IMPRESSION: Elevated pulmonary capillary wedge pressure, probably from  diastolic dysfunction and hyperdynamic left ventricle.        Condition on Discharge:  good      Physical Exam at Discharge    Vital Signs  Temp:  [97.3 °F (36.3 °C)-98 °F (36.7 °C)] 98 °F (36.7 °C)  Heart Rate:  [64-86] 67  Resp:  [16-18] 16  BP: ()/(53-80) 124/79      Physical Exam:   General Appearance alert, appears  stated age and cooperative  Neck no adenopathy, suppple, trachea midline, no thyromegaly, no carotid bruit and no JVD  Lungs clear to auscultation, respirations regular, respirations even and respirations unlabored  Heart regular rhythm & normal rate, normal S1, S2, no murmur, no mary, no rub and no click  Abdomen normal bowel sounds, no masses, no hepatomegaly, no splenomegaly, soft non-tender, no guarding and no rebound tenderness  Pulses Pulses palpable and equal bilaterally  Skin no bleeding, bruising or rash  Neurologic Mental Status orientated to person, place, time and situation  Cardiac: There is a soft systolic murmur present    Discharge Disposition  Home or Self Care    Discharge Medications   Kenan Garrison   Home Medication Instructions KEVIN:[id number]    Printed on:02/03/17 1431   Medication Information                      acetaminophen (TYLENOL) 325 MG tablet  Take 2 tablets by mouth Every 6 (Six) Hours As Needed for mild pain (1-3).             allopurinol (ZYLOPRIM) 300 MG tablet  Take 300 mg by mouth At Night As Needed.             aspirin 81 MG EC tablet  Take 81 mg by mouth Every Night.             dabigatran etexilate (PRADAXA) 150 MG capsu  Take 1 capsule by mouth Every 12 (Twelve) Hours.             metoprolol succinate XL (TOPROL-XL) 50 MG 24 hr tablet  Take 1 tablet by mouth Daily.             omeprazole (PriLOSEC) 40 MG capsule  Take 40 mg by mouth Daily.             potassium chloride (KLOR-CON) 20 MEQ packet  Take 10 mEq by mouth Daily.             pravastatin (PRAVACHOL) 40 MG tablet  Take 1 tablet by mouth Every Night.             PREDNISONE PO  Take 20 mg by mouth Daily.             tamsulosin (FLOMAX) 0.4 MG capsule 24 hr capsule  Take 1 capsule by mouth 2 (Two) Times a Day.             tiotropium (SPIRIVA) 18 MCG per inhalation capsule  Place 2 puffs into inhaler and inhale Daily.             traMADol (ULTRAM) 50 MG tablet  Take 50 mg by mouth Every 6 (Six) Hours As Needed for  moderate pain (4-6).                 Discharge Diet:     Activity at Discharge:     Follow-up Appointments  Future Appointments  Date Time Provider Department Center   2/9/2017 2:50 PM MD [facility]ORA [facility] None   2/13/2017 1:15 PM MD LENORA Jensen [facility] None   3/9/2017 9:35 AM MD [facility] None   3/13/2017 11:15 AM MD [facility]ORA Rincon [facility] None   4/21/2017 10:00 AM MD [facility]ORA Rincon [facility] None     Referrals and Follow-ups to Schedule     Call MD With Problems / Concerns    As directed    Instructions: call our office if has any questions or concerns.       Follow-Up    As directed    Dr. ELKE Gonzalez, PCP, Dr. Jung-as directed                 Test Results Pending at Discharge       Ena Castillo APRKATIE  02/03/17  2:32 PM    Time:40 min      Patient personally interviewed and above subjective findings personally confirmed during a face to face contact with patient today, PMH,Meds,SH and FH, ROS are obtained or reconfirmed by me and I personally performed the  physical examination.  All labs, cardiac procedures,pertinent radiographs of the last 24 hours personally reviewed, Impression and plans discussed/elaborated and implemented by me or jointly as described above -----------Ever Gonzalez MD

## 2017-02-03 NOTE — PROGRESS NOTES
[facility]  Cardiology Progress Note    Patient Identification:  Name:Kenan Garrison  Age:71 y.o.  Sex: male  :  1945  MRN: [id number]             Length of Stay: 2    Follow up for:  ISSA galvez with [facility]    Interval History :      Feeling better after an ablation of the atrial flutter and doing good. Heart rate is better    HPI      Past Medical History   Diagnosis Date   • Abnormal electrocardiogram    • Atrial fibrillation    • BPH (benign prostatic hyperplasia)    • Bundle branch block, left hemiblock    • Cancer      bladder   • Cardiovascular risk factor      Diabetes.  Positive hypertension.  Nonsmoker.  Positive family history of early CAD.  Positive hyperlipidemia   • Chest pain    • Chest pain    • CHF (congestive heart failure)      per patient record   • Coronary artery disease    • Dyslipidemia    • Fatigue    • Generalized osteoarthritis of unspecified site    • H/O cardiac radiofrequency ablation    • Hepatitis    • Hyperlipidemia    • Hypertension    • Jaundice      age 18   • Mitral valve insufficiency    • Obesity    • Pneumonia    • Right bundle branch block    • Sleep apnea    • Urinary stone        Scheduled Meds:    Current Facility-Administered Medications:   •  acetaminophen (TYLENOL) tablet 650 mg, 650 mg, Oral, Q6H PRN, Vikas Tom Jr., MD  •  acetaminophen (TYLENOL) tablet 650 mg, 650 mg, Oral, Q4H PRN, Lee Jung MD  •  allopurinol (ZYLOPRIM) tablet 300 mg, 300 mg, Oral, Nightly PRN, Vikas Tom Jr., MD  •  aspirin EC tablet 81 mg, 81 mg, Oral, Nightly, Vikas Tom Jr., MD, 81 mg at 17 1856  •  dabigatran etexilate (PRADAXA) capsule 150 mg, 150 mg, Oral, Q12H, Lee Jung MD, 150 mg at 17 1811  •  HYDROcodone-acetaminophen (NORCO) 5-325 MG per tablet 1 tablet, 1 tablet, Oral, Q4H PRN, Lee Jung MD  •  HYDROcodone-acetaminophen (NORCO) 7.5-325 MG per tablet 1 tablet, 1 tablet, Oral, Q4H PRN, Lee Jung MD  •  " lactated ringers infusion, 9 mL/hr, Intravenous, Continuous, Sebastian Gatica MD, Stopped at 02/02/17 1707  •  [START ON 2/3/2017] metoprolol succinate XL (TOPROL-XL) 24 hr tablet 50 mg, 50 mg, Oral, Q24H, Herman Gonzalez MD  •  ondansetron (ZOFRAN) injection 4 mg, 4 mg, Intravenous, Q6H PRN, Lee Jung MD  •  pantoprazole (PROTONIX) EC tablet 40 mg, 40 mg, Oral, Q AM, Vikas Tom Jr., MD, 40 mg at 02/01/17 0818  •  potassium chloride (KLOR-CON) packet 10 mEq, 10 mEq, Oral, Daily, Vikas Tom Jr., MD, 10 mEq at 02/01/17 0900  •  pravastatin (PRAVACHOL) tablet 40 mg, 40 mg, Oral, Nightly, Vikas Tom Jr., MD, 40 mg at 02/01/17 2257  •  predniSONE (DELTASONE) tablet 20 mg, 20 mg, Oral, Daily With Breakfast, Vikas Tom Jr., MD, 20 mg at 02/01/17 0818  •  sodium chloride 0.9 % flush 1-10 mL, 1-10 mL, Intravenous, PRN, Vikas Tom Jr., MD  •  sodium chloride 0.9 % flush 1-10 mL, 1-10 mL, Intravenous, PRN, Sebastian Gatica MD  •  sodium chloride 0.9 % flush 1-10 mL, 1-10 mL, Intravenous, PRN, Lee Jung MD  •  sodium chloride 0.9 % flush 10 mL, 10 mL, Intravenous, PRN, Tonio Victoria MD  •  tamsulosin (FLOMAX) 24 hr capsule 0.4 mg, 0.4 mg, Oral, Daily, Vikas Tom Jr., MD, 0.4 mg at 02/01/17 0818  •  tiotropium (SPIRIVA) 18 MCG per inhalation capsule 1 capsule, 1 capsule, Inhalation, Daily - RT, Vikas Tom Jr., MD, 1 capsule at 02/01/17 0904  •  traMADol (ULTRAM) tablet 50 mg, 50 mg, Oral, Q6H PRN, Vikas Tom Jr., MD, 50 mg at 02/02/17 4524    All systems were reviewed and negative except for:  Constitution:  positive for fatigue  Respiratory: positive for  shortness of air  Cardiovascular: positive for  chest pressure / pain, at rest, palpitations and dizziness.  No chest pain now.    Visit Vitals   • /66 (BP Location: Right arm, Patient Position: Lying)   • Pulse 73   • Temp 97.4 °F (36.3 °C) (Oral)   • Resp 18   • Ht 69\" (175.3 cm)   • Wt 248 lb (112 " kg)   • SpO2 95%   • BMI 36.62 kg/m2          Intake/Output Summary (Last 24 hours) at 02/02/17 2206  Last data filed at 02/02/17 1614   Gross per 24 hour   Intake   1719 ml   Output    675 ml   Net   1044 ml          Wt Readings from Last 1 Encounters:   02/02/17 1657 248 lb (112 kg)   01/31/17 1350 235 lb (107 kg)       Physical Examination: By     Physical Exam    General: No acute distress.    Skin: Warm and dry, no diaphoresis noted   HEENT: No ptosis;  oral mucosa moist   Neck: Supple; no carotid bruits; no JVD, Trachea mid line   Heart: S1S2  regular rate and rhythm;  Softy systolic murmurs; no gallop or rub appreciated, No thrills palpable   Chest: Respirations unlabored at rest, bilateral breath sounds have good air entry; no  wheezes auscultated.     Abdomen: Soft, non-tender, non-distended, positive bowel sounds  ,No aneurysms palpable   Extremities: Bilateral lower extremities have no pre-tibial pitting edema; Radials are palpable   Neurological: Alert and oriented ; no new motor deficits,       Lab Review:  Personally reviewed the labs, radiology imaging and other cardiac procedures.       Results from last 7 days  Lab Units 02/02/17  0642  01/31/17  1422   SODIUM mmol/L 142  < > 145   POTASSIUM mmol/L 4.1  < > 3.5   CHLORIDE mmol/L 104  < > 106   TOTAL CO2 mmol/L 26.7  < > 25.7   BUN mg/dL 19  < > 13   CREATININE mg/dL 0.99  < > 0.98   CALCIUM mg/dL 9.3  < > 9.1   BILIRUBIN mg/dL  --   --  0.6   ALK PHOS U/L  --   --  58   ALT (SGPT) U/L  --   --  17   AST (SGOT) U/L  --   --  10   GLUCOSE mg/dL 106*  < > 110*   < > = values in this interval not displayed.    Results from last 7 days  Lab Units 02/02/17  0642 02/01/17  0617 02/01/17  0008   TROPONIN T ng/mL <0.010 <0.010 <0.010     @LABRCNTbnp@    Results from last 7 days  Lab Units 02/01/17  0617 01/31/17  1422   WBC 10*3/mm3 12.02* 13.05*   HEMOGLOBIN g/dL 14.5 13.7   HEMATOCRIT % 45.8 42.3   PLATELETS 10*3/mm3 164 164       Results  from last 7 days  Lab Units 02/01/17  0617   INR  1.32*       Estimated Creatinine Clearance: 86.9 mL/min (by C-G formula based on Cr of 0.99).    I personally viewed and interpreted the patient's EKG/Telemetry data    ECG: sinus     Echocardiogram:         Patient Active Problem List   Diagnosis   • Bundle branch block, right   • Coronary artery disease   • Awareness of heartbeats   • HLD (hyperlipidemia)   • BP (high blood pressure)   • Loud breathing during sleep   • Tachycardia, paroxysmal   • Greater trochanteric bursitis of right hip   • Chest discomfort   • Ankle edema   • Dysphagia   • Chronic congestive heart failure   • Chest pain at rest   • Precordial chest pain   • Paroxysmal a-fib   • Community acquired bacterial pneumonia   • Acute respiratory failure with hypoxia   • Interstitial lung disease   • Acute and chronic respiratory failure with hypoxia   • Diastolic CHF, acute on chronic   • AF (atrial fibrillation)   • Precordial pain   • Atrial flutter with rapid ventricular response   • A-fib   • Atrial fibrillation   • Atypical atrial flutter       Assessment :    Atrial flutter s/p ablation and doing better  Takes anticoagulation: Takes Pradaxa-on hold  H/o Off ACE inhibitor stopped due to cough    Troponins negative.     Will add small dose of Toprol and hopefully will DC in AM    Ever Gonzalez MD, [facility]  2/2/20173:14 PM      Patient is personally examined by me. All labs, radiology reports and cardiac procedures are reviewed and or obtained by me. Asessment and plan is by me.-----Ever Gonzalez MD

## 2017-02-03 NOTE — PLAN OF CARE
Problem: Patient Care Overview (Adult)  Goal: Plan of Care Review  Outcome: Ongoing (interventions implemented as appropriate)    02/03/17 0630   Patient Care Overview   Progress progress toward functional goals as expected   Coping/Psychosocial Response Interventions   Plan Of Care Reviewed With patient       Goal: Adult Individualization and Mutuality  Outcome: Ongoing (interventions implemented as appropriate)  Goal: Discharge Needs Assessment  Outcome: Ongoing (interventions implemented as appropriate)    02/01/17 1107 02/03/17 0630   Discharge Needs Assessment   Concerns To Be Addressed --  no discharge needs identified   Readmission Within The Last 30 Days --  no previous admission in last 30 days   Equipment Needed After Discharge --  none   Discharge Disposition --  still a patient   Current Health   Anticipated Changes Related to Illness --  none   Living Environment   Transportation Available --  family or friend will provide   Self-Care   Equipment Currently Used at Home oxygen  (Home O2 from [facility]) --          Problem: Arrhythmia/Dysrhythmia (Symptomatic) (Adult)  Goal: Signs and Symptoms of Listed Potential Problems Will be Absent or Manageable (Arrhythmia/Dysrhythmia)  Outcome: Ongoing (interventions implemented as appropriate)    02/03/17 0630   Arrhythmia/Dysrhythmia (Symptomatic)   Problems Assessed (Arrhythmia/Dysrhythmia) all   Problems Present (Arrhythmia/Dysrhythmia) none

## 2017-02-03 NOTE — ANESTHESIA POSTPROCEDURE EVALUATION
Patient: Kenan Garrison    Procedure Summary     Date Anesthesia Start Anesthesia Stop Room / Location    02/02/17 1049 1424 [facility]  /  [facility]       Procedure Diagnosis Provider Provider    Ablation atrial flutter (N/A ); Ablation atrial fibrillation (N/A ) (Atrial fibrillation and atrial flutter) MD Jamie Perez MD          Anesthesia Type: general  Last vitals  BP      Temp      Pulse     Resp      SpO2        Post Anesthesia Care and Evaluation    Patient location during evaluation: PHASE II  Patient participation: complete - patient participated  Level of consciousness: awake and alert  Pain score: 0  Pain management: adequate  Airway patency: patent  Anesthetic complications: No anesthetic complications  PONV Status: none  Cardiovascular status: acceptable  Respiratory status: acceptable  Hydration status: acceptable

## 2017-02-03 NOTE — PROGRESS NOTES
"                       [facility]  Cardiology Progress Note    Patient Identification:  Name:Kenan Garrison  Age:71 y.o.  Sex: male  :  1945  MRN: [id number]             Length of Stay: 3    Follow up for:  ISSA galvez with RVR    Interval History :      \"Feeling better not as tired\" after an ablation of the atrial flutter and doing good. Heart rate is better    HPI      Past Medical History   Diagnosis Date   • Abnormal electrocardiogram    • Atrial fibrillation    • BPH (benign prostatic hyperplasia)    • Bundle branch block, left hemiblock    • Cancer      bladder   • Cardiovascular risk factor      Diabetes.  Positive hypertension.  Nonsmoker.  Positive family history of early CAD.  Positive hyperlipidemia   • Chest pain    • Chest pain    • CHF (congestive heart failure)      per patient record   • Coronary artery disease    • Dyslipidemia    • Fatigue    • Generalized osteoarthritis of unspecified site    • H/O cardiac radiofrequency ablation    • Hepatitis    • Hyperlipidemia    • Hypertension    • Jaundice      age 18   • Mitral valve insufficiency    • Obesity    • Pneumonia    • Right bundle branch block    • Sleep apnea    • Urinary stone        Scheduled Meds:    Current Facility-Administered Medications:   •  acetaminophen (TYLENOL) tablet 650 mg, 650 mg, Oral, Q6H PRN, Vikas Tom Jr., MD  •  acetaminophen (TYLENOL) tablet 650 mg, 650 mg, Oral, Q4H PRN, Lee Jung MD  •  allopurinol (ZYLOPRIM) tablet 300 mg, 300 mg, Oral, Nightly PRN, Vikas Tom Jr., MD  •  aspirin EC tablet 81 mg, 81 mg, Oral, Nightly, Vikas Tom Jr., MD, 81 mg at 17 2256  •  dabigatran etexilate (PRADAXA) capsule 150 mg, 150 mg, Oral, Q12H, Lee Jung MD, 150 mg at 17 1811  •  HYDROcodone-acetaminophen (NORCO) 5-325 MG per tablet 1 tablet, 1 tablet, Oral, Q4H PRN, Lee Jung MD  •  HYDROcodone-acetaminophen (NORCO) 7.5-325 MG per tablet 1 tablet, 1 tablet, Oral, Q4H PRN, Lee" "MD Erich  •  lactated ringers infusion, 9 mL/hr, Intravenous, Continuous, Sebastian Gatica MD, Stopped at 02/02/17 1707  •  metoprolol succinate XL (TOPROL-XL) 24 hr tablet 50 mg, 50 mg, Oral, Q24H, Herman Gonzalez MD  •  ondansetron (ZOFRAN) injection 4 mg, 4 mg, Intravenous, Q6H PRN, Lee Jung MD  •  pantoprazole (PROTONIX) EC tablet 40 mg, 40 mg, Oral, Q AM, Vikas Tom Jr., MD, 40 mg at 02/03/17 0545  •  potassium chloride (KLOR-CON) packet 10 mEq, 10 mEq, Oral, Daily, Vikas Tom Jr., MD, 10 mEq at 02/02/17 2314  •  pravastatin (PRAVACHOL) tablet 40 mg, 40 mg, Oral, Nightly, Vikas Tom Jr., MD, 40 mg at 02/02/17 2313  •  predniSONE (DELTASONE) tablet 20 mg, 20 mg, Oral, Daily With Breakfast, Vikas Tom Jr., MD, 20 mg at 02/02/17 2313  •  sodium chloride 0.9 % flush 1-10 mL, 1-10 mL, Intravenous, PRN, Vikas Tom Jr., MD  •  sodium chloride 0.9 % flush 1-10 mL, 1-10 mL, Intravenous, PRN, Sebastian Gatica MD  •  sodium chloride 0.9 % flush 1-10 mL, 1-10 mL, Intravenous, PRN, Lee Jung MD  •  sodium chloride 0.9 % flush 10 mL, 10 mL, Intravenous, PRN, Tonio Victoria MD  •  tamsulosin (FLOMAX) 24 hr capsule 0.4 mg, 0.4 mg, Oral, Daily, Vikas Tom Jr., MD, 0.4 mg at 02/02/17 2313  •  tiotropium (SPIRIVA) 18 MCG per inhalation capsule 1 capsule, 1 capsule, Inhalation, Daily - RT, Vikas Tom Jr., MD, 1 capsule at 02/01/17 0904  •  traMADol (ULTRAM) tablet 50 mg, 50 mg, Oral, Q6H PRN, Vikas Tom Jr., MD, 50 mg at 02/03/17 0543    All systems were reviewed and negative except for:  Constitution:  positive for fatigue  Respiratory: positive for  shortness of air  Cardiovascular: positive for  chest pressure / pain, at rest, palpitations and dizziness.  No chest pain now.  NO CP, no SOB  Visit Vitals   • /71 (BP Location: Right arm, Patient Position: Lying)   • Pulse 84   • Temp 98 °F (36.7 °C) (Oral)   • Resp 18   • Ht 69\" (175.3 cm)   • Wt 236 " lb (107 kg)   • SpO2 90%   • BMI 34.85 kg/m2          Intake/Output Summary (Last 24 hours) at 02/03/17 0822  Last data filed at 02/02/17 2025   Gross per 24 hour   Intake   1719 ml   Output   2075 ml   Net   -356 ml          Wt Readings from Last 1 Encounters:   02/03/17 0500 236 lb (107 kg)   02/02/17 1657 248 lb (112 kg)   01/31/17 1350 235 lb (107 kg)       Physical Examination: By     Physical Exam    General: No acute distress.    Skin: Warm and dry, no diaphoresis noted   HEENT: No ptosis;  oral mucosa moist   Neck: Supple; no carotid bruits; no JVD, Trachea mid line   Heart: S1S2  regular rate and rhythm;  Softy systolic murmurs; no gallop or rub appreciated, No thrills palpable   Chest: Respirations unlabored at rest, bilateral breath sounds have good air entry; no  wheezes auscultated.     Abdomen: Soft, non-tender, non-distended, positive bowel sounds  ,No aneurysms palpable   Extremities: Bilateral lower extremities have no pre-tibial pitting edema; Radials are palpable   Neurological: Alert and oriented ; no new motor deficits,       Lab Review:  Personally reviewed the labs, radiology imaging and other cardiac procedures.       Results from last 7 days  Lab Units 02/03/17  0446  01/31/17  1422   SODIUM mmol/L 139  < > 145   POTASSIUM mmol/L 4.3  < > 3.5   CHLORIDE mmol/L 101  < > 106   TOTAL CO2 mmol/L 25.2  < > 25.7   BUN mg/dL 15  < > 13   CREATININE mg/dL 1.05  < > 0.98   CALCIUM mg/dL 9.0  < > 9.1   BILIRUBIN mg/dL  --   --  0.6   ALK PHOS U/L  --   --  58   ALT (SGPT) U/L  --   --  17   AST (SGOT) U/L  --   --  10   GLUCOSE mg/dL 100*  < > 110*   < > = values in this interval not displayed.    Results from last 7 days  Lab Units 02/02/17  0642 02/01/17  0617 02/01/17  0008   TROPONIN T ng/mL <0.010 <0.010 <0.010     @[id number]@    Results from last 7 days  Lab Units 02/03/17  0446 02/01/17  0617 01/31/17  1422   WBC 10*3/mm3 13.15* 12.02* 13.05*   HEMOGLOBIN g/dL 12.1* 14.5 13.7      HEMATOCRIT % 38.6* 45.8 42.3   PLATELETS 10*3/mm3 146 164 164       Results from last 7 days  Lab Units 02/03/17  0446 02/01/17  0617   INR  1.22* 1.32*       Estimated Creatinine Clearance: 80 mL/min (by C-G formula based on Cr of 1.05).    I personally viewed and interpreted the patient's EKG/Telemetry data    ECG: sinus     Echocardiogram:     Interpretation Summary      · All left ventricular wall segments contract normally.  · Left ventricular function is normal.  · Left ventricular diastolic dysfunction (grade I) consistent with impaired relaxation.  · Left atrial cavity size is borderline dilated.  · Mild mitral valve regurgitation is present  · Mild tricuspid valve regurgitation is present.     EP study 2/2  Conclusions:      Reconnection, albeit w delay of Right inferior and left common PV.   Succ redo PVI w ablation at the gaps.   Fractionated e-grams on posterior wall during flutter -- isolation of the posterior wall.   Re-ablation of the LA roof.  Clinical arrhythmia was MI flutter  Succ ablation of the MI -- documented block.   CTI line was ok.   Normal Av node and HV     Patient Active Problem List   Diagnosis   • Bundle branch block, right   • Coronary artery disease   • Awareness of heartbeats   • HLD (hyperlipidemia)   • BP (high blood pressure)   • Loud breathing during sleep   • Tachycardia, paroxysmal   • Greater trochanteric bursitis of right hip   • Chest discomfort   • Ankle edema   • Dysphagia   • Chronic congestive heart failure   • Chest pain at rest   • Precordial chest pain   • Paroxysmal a-fib   • Community acquired bacterial pneumonia   • Acute respiratory failure with hypoxia   • Interstitial lung disease   • Acute and chronic respiratory failure with hypoxia   • Diastolic CHF, acute on chronic   • AF (atrial fibrillation)   • Precordial pain   • Atrial flutter with rapid ventricular response   • A-fib   • Atrial fibrillation   • Atypical atrial flutter       Assessment :    Atrial  flutter s/p ablation and doing better  Takes anticoagulation: Takes Pradaxa-on hold  H/o Off ACE inhibitor stopped due to cough    Troponins negative.     Taking Toprol, ASA, pradaxa pravachol    Hope to DC soon when ready  .  Ever Gonzalez MD, [facility]  2/3/21308:14 PM      Patient is personally examined by me. All labs, radiology reports and cardiac procedures are reviewed and or obtained by me. Asessment and plan is by me.-----Ever Gonzalez MD

## 2017-02-11 NOTE — TELEPHONE ENCOUNTER
Stop allopurinol do not take this again ever.  Short-term was give patient prednisone 20 mg 2 tablets for 2 days 1 tablet for 3 days he is let us know if he is not responding Tuesday if symptoms do worsen needs go to the ER.

## 2017-02-11 NOTE — TELEPHONE ENCOUNTER
TOOK ALLOPURINOL  RASH ALL OVER  NO SHORTNESS OF BREATH OR CHEST PAIN     ----- Message from Juany Marie sent at 2/11/2017 11:04 AM EST -----  Contact: [phone]  PT TOOK HIS GOUT MEDICATION AND HES HAD AN ALLERGIC REACTION ON HIS SKIN. PLEASE CALL ASAP

## 2017-02-12 NOTE — ED PROVIDER NOTES
EMERGENCY DEPARTMENT ENCOUNTER    CHIEF COMPLAINT  Chief Complaint: allergic reaction  History given by: patient, family  History limited by: N/A  Room Number: 08/08  PMD: Dileep Mccall MD (has appt tomorrow)      HPI:  Pt is a 71 y.o. male who presents with allergic reaction which started yesterday. After taking allopurinol for a gout exacerbation, he developed an itchy red skin rash to his back and trunk. He denies trouble breathing, trouble swallowing, sore throat, swelling, cough, chest pain, abd pain, N/V/D, fevers, chills, and any other recent lifestyle changes (no new soaps, new detergents, etc). He has taken allopurinol in the past without difficulty. Past Medical History of HTN, gout, diabetes, and atrial fibrillation.     Duration: started yesterday  Timing: constant  Location: back and trunk  Radiation: none  Quality: itchy  Intensity/Severity: moderate  Progression: unchanged  Associated Symptoms: itchy red skin rash to back and trunk  Aggravating Factors: taking allopurinol   Alleviating Factors: none  Previous Episodes: none mentioned  Treatment before arrival: none mentioned     PAST MEDICAL HISTORY  Active Ambulatory Problems     Diagnosis Date Noted   • Bundle branch block, right 02/01/2016   • Coronary artery disease 02/01/2016   • Awareness of heartbeats 02/01/2016   • HLD (hyperlipidemia) 02/01/2016   • BP (high blood pressure) 02/01/2016   • Loud breathing during sleep 02/01/2016   • Tachycardia, paroxysmal 02/01/2016   • Greater trochanteric bursitis of right hip 03/16/2016   • Chest discomfort 03/17/2016   • Ankle edema 03/17/2016   • Dysphagia 04/21/2016   • Chronic congestive heart failure 04/21/2016   • Chest pain at rest 06/30/2016   • Precordial chest pain 09/14/2016   • Paroxysmal a-fib 10/26/2016   • Community acquired bacterial pneumonia 11/06/2016   • Acute respiratory failure with hypoxia 11/07/2016   • Interstitial lung disease 11/10/2016   • Acute and chronic respiratory  failure with hypoxia 11/10/2016   • Diastolic CHF, acute on chronic 11/10/2016   • AF (atrial fibrillation) 12/13/2016   • Precordial pain 01/17/2017   • Atrial flutter with rapid ventricular response 01/20/2017   • A-fib 01/31/2017   • Atrial fibrillation 01/31/2017   • Atypical atrial flutter 02/01/2017     Resolved Ambulatory Problems     Diagnosis Date Noted   • No Resolved Ambulatory Problems     Past Medical History   Diagnosis Date   • Abnormal electrocardiogram    • BPH (benign prostatic hyperplasia)    • Bundle branch block, left hemiblock    • Cancer    • Cardiovascular risk factor    • Chest pain    • Chest pain    • CHF (congestive heart failure)    • Dyslipidemia    • Fatigue    • Generalized osteoarthritis of unspecified site    • H/O cardiac radiofrequency ablation    • Hepatitis    • Hyperlipidemia    • Hypertension    • Jaundice    • Mitral valve insufficiency    • Obesity    • Pneumonia    • Right bundle branch block    • Sleep apnea    • Urinary stone        PAST SURGICAL HISTORY  Past Surgical History   Procedure Laterality Date   • Knee arthroscopy Right    • Thromboendarterectomy       CAROTID   • Coronary angioplasty with stent placement     • Tonsillectomy     • Bladder surgery       cancer  approx 3yrs ago   • Colonoscopy     • Endoscopy N/A 7/26/2016     Procedure: ESOPHAGOGASTRODUODENOSCOPY with esophageal dilation #48 and #52 Mckeon dilators;  Surgeon: Sebastian Ho MD;  Location: [facility] ENDOSCOPY;  Service:    • Coronary angioplasty with stent placement     • Cardiac electrophysiology procedure N/A 10/26/2016     Procedure: Ablation atrial fibrillation;  Surgeon: Primo Dewitt MD;  Location: [facility] CATH INVASIVE LOCATION;  Service:    • Cardiac ablation     • Tonsillectomy and adenoidectomy     • Cardiac electrophysiology procedure N/A 2/2/2017     Procedure: Ablation atrial flutter;  Surgeon: Lee Jung MD;  Location: [facility] CATH INVASIVE LOCATION;  Service:    • Cardiac  electrophysiology procedure N/A 2/2/2017     Procedure: Ablation atrial fibrillation;  Surgeon: Lee Jung MD;  Location: [facility] INVASIVE LOCATION;  Service:        FAMILY HISTORY  Family History   Problem Relation Age of Onset   • Coronary artery disease Other    • Heart disease Mother        SOCIAL HISTORY  Social History     Social History   • Marital status:      Spouse name: N/A   • Number of children: N/A   • Years of education: N/A     Occupational History   • Not on file.     Social History Main Topics   • Smoking status: Former Smoker     Packs/day: 3.50     Years: 22.00     Types: Cigarettes     Quit date: 1987   • Smokeless tobacco: Never Used   • Alcohol use No   • Drug use: No   • Sexual activity: Defer     Other Topics Concern   • Not on file     Social History Narrative         ALLERGIES  Albuterol; Amiodarone; Biaxin [clarithromycin]; Lipitor [atorvastatin]; and Allopurinol    REVIEW OF SYSTEMS  Review of Systems   Constitutional: Negative for chills and fever.   HENT: Negative for facial swelling, sore throat and trouble swallowing.    Respiratory: Negative for cough and shortness of breath.    Cardiovascular: Negative for chest pain.   Gastrointestinal: Negative for abdominal pain, diarrhea, nausea and vomiting.   Genitourinary: Negative for dysuria.   Musculoskeletal: Negative for back pain.   Skin: Positive for rash (itchy red skin rash to trunk and back).   Neurological: Negative for dizziness.   Psychiatric/Behavioral: The patient is not nervous/anxious.        PHYSICAL EXAM  ED Triage Vitals   Temp Heart Rate Resp BP SpO2   02/12/17 0947 02/12/17 0947 02/12/17 0947 02/12/17 1008 02/12/17 1008   97.3 °F (36.3 °C) 82 20 140/69 93 %         Physical Exam   Constitutional: He is oriented to person, place, and time and well-developed, well-nourished, and in no distress. No distress.   Speaking in full sentences, no drooling, tolerating PO secretions without difficulty   HENT:    Head: Atraumatic.   Mouth/Throat: Mucous membranes are normal. No oropharyngeal exudate, posterior oropharyngeal edema or posterior oropharyngeal erythema.   Eyes: No scleral icterus.   Neck: Normal range of motion.   Cardiovascular: Normal rate, regular rhythm and normal heart sounds.    Pulmonary/Chest: Effort normal and breath sounds normal. No respiratory distress.   No airway obstruction   Abdominal: Soft. There is no tenderness. There is no rebound and no guarding.   Musculoskeletal: Normal range of motion.   Neurological: He is alert and oriented to person, place, and time. He has normal sensation and normal strength.   Skin: Skin is warm and dry. Rash (red rash to back and trunk) noted.   Psychiatric: Mood and affect normal.   Nursing note and vitals reviewed.            MEDICAL RECORD REVIEW    PROCEDURES      PROGRESS AND CONSULTS  10:18 AM- Ordered pepcid, solumedrol, and benadryl for allergic reaction.   12:17 PM- Reviewed pt's history and workup with Dr. Gloria.  At bedside evaluation, they agree with the plan of care.  12:20 PM- Rechecked pt. He reports that his itching has improved after ED treatment. Reviewed implications of results, diagnosis, meds, responsibility to follow up, warning signs and symptoms of possible worsening, potential complications and reasons to return to ER with patient.  Discussed all results and noted any abnormalities with patient.  Discussed absolute need to recheck abnormalities and condition with PMD tomorrow as scheduled. Informed pt of plan to prescribe pepcid and steroids to help treat allergic reaction. Advised pt to monitor blood glucose levels closely while taking steroids due to his hx of diabetes. Also advised pt to take benadryl as needed for itching, to avoid taking allopurinol, and to discuss with PMD about starting alternative treatment for gout.   Discussed plan for discharge, as there is no emergent indication for admission.  Pt is agreeable and understands  "need for follow up and repeat testing.  Pt is aware that discharge does not mean that nothing is wrong but it indicates no emergency is present.  Pt is discharged with instructions to follow up with primary care doctor to have their blood pressure rechecked.       DIAGNOSIS  Final diagnoses:   Allergic reaction, initial encounter       FOLLOW UP   Dileep Mccall Jr., MD  [address] [facility] [address]  [phone]    Call in 1 day        RX      famotidine 20 MG tablet   Commonly known as:  PEPCID   Take 1 tablet by mouth 2 (Two) Times a Day.       MethylPREDNISolone 4 MG tablet   Commonly known as:  MEDROL (REYES)   Take as directed on package instructions.               MEDICATIONS GIVEN IN ER  Medications   famotidine (PEPCID) injection 20 mg (20 mg Intravenous Given 2/12/17 1026)   methylPREDNISolone sodium succinate (SOLU-Medrol) injection 125 mg (125 mg Intravenous Given 2/12/17 1026)   diphenhydrAMINE (BENADRYL) injection 50 mg (50 mg Intravenous Given 2/12/17 1026)       Visit Vitals   • /64   • Pulse 61   • Temp 97.3 °F (36.3 °C) (Tympanic)   • Resp 16   • Ht 69\" (175.3 cm)   • Wt 235 lb (107 kg)   • SpO2 96%   • BMI 34.7 kg/m2         I personally reviewed the past medical history, past surgical history, social history, family history, current medications and allergies as they appear in this chart.  The scribe's note accurately reflects the work and decisions made by me.     I personally scribed for AMANDEEP Silva on 2/12/2017 at 12:49 PM.  Electronically signed by Thad Estrada on 2/12/2017 at time 12:49 PM       Thad Estrada  02/12/17 9587       RADHA Branch  02/12/17 1801    "

## 2017-02-12 NOTE — DISCHARGE INSTRUCTIONS
Medications as ordered  Continue current home medications  Benadryl as needed for itching  Avoid taking Allopurinol  Keep scheduled appointment with PMD tomorrow  Return to er for fever, chest pain, shortness of air, worsening rash, or any new or worsening symptoms

## 2017-02-12 NOTE — ED PROVIDER NOTES
I supervised care provided by the midlevel provider.    We have discussed this patient's history, physical exam, and treatment plan.   I have reviewed the note and personally saw and examined the patient and agree with the plan of care.    HPI:  Pt reports to the ED complaining of an allergic reaction to Allopurinol onset earlier today. The pt states his rash and itchiness has improved after receiving the Benadryl, Pepcid, and Solu-medrol.    Discussed the plan to discharge the pt due to his improvement in symptoms and have him f/u with his PCP. I advised the pt to avoid using Allopurinol. I informed the pt he can have an allergy test to confirm his allergy to Allopurinol. The pt understands and agrees with the plan.    Physical Exam:  BP- 110/64 HR- 61 Temp- 97.3 °F (36.3 °C) (Tympanic) O2 sat- 96%  Pt is neurovascularly intact, alert, and oriented X 3. The pt has a diffuse urticarial rash to central trunk. The pt is in NAD and their vital signs are stable.    RADHA Jensen  02/12/17 1228       Jamie Gloria MD  02/12/17 1420

## 2017-02-13 PROBLEM — K22.4 ESOPHAGEAL SPASM: Status: ACTIVE | Noted: 2017-01-01

## 2017-02-13 NOTE — PROGRESS NOTES
Subjective   Kenan Garrison is a 71 y.o. male is here today for follow-up.  CC: dysphagia.   History of Present Illness  It was hospitalized fairly recently and because of symptoms of dysphagia had an upper GI series performed.  That examination demonstrated a small hiatal hernia, some reflux, and esophageal spasm but no fixed obstruction.  He was advised to come and see me after his discharge.  He underwent EGD in July 2016 and at that time there were no gross lesions associated with the esophagus.  He was dilated up and seem to have some benefit for a few months.  He is now having recurrent dysphagia but if he is careful he can avoid severe episodes.  The following portions of the patient's history were reviewed and updated as appropriate: allergies, current medications, past family history, past medical history, past social history, past surgical history and problem list.    Review of Systems   HENT: Positive for trouble swallowing.    Respiratory: Positive for shortness of breath. Negative for chest tightness.    Cardiovascular: Negative for chest pain.       Objective   Physical Exam   Constitutional: He appears well-developed and well-nourished.   Nursing note and vitals reviewed.        Assessment/Plan   Problems Addressed this Visit        Digestive    Dysphagia - Primary    Esophageal spasm        The patient has reflux and esophageal spasm but no fixed lesion.  I would recommend he continue to use antireflux medications.  He does not require, nor does he desire, an EGD at the moment.  Should his symptoms worsen, I'll be happy to see him back.

## 2017-02-20 NOTE — PROGRESS NOTES
Subjective   Kenan Garrison is a 71 y.o. male.     History of Present Illness   On pradaxa  For a fib  Gout q3mo  Some chr  Has paf  Mild leg edema  Took extra laix    Had ambien for insomnia    Review of Systems   All other systems reviewed and are negative.      Objective   Vitals:    02/20/17 1059   BP: 152/90   Pulse: 70   Temp: 97.6 °F (36.4 °C)   SpO2: 90%   Weight: 240 lb 3.2 oz (109 kg)     Physical Exam   Constitutional: He appears well-developed and well-nourished.   Cardiovascular: Normal rate, regular rhythm and normal heart sounds.    Pulmonary/Chest: Effort normal.       Lab Results   Component Value Date    INR 1.22 (H) 02/03/2017    INR 1.32 (H) 02/01/2017    INR 1.09 01/20/2017       Procedures    Assessment/Plan   1.  Toxic dermal reaction to allopurinol seen ER doing better now rash is still present but faded.  Patient advised to discontinue allopurinol forever.  Colchicine 0.6 mg trial will begin as Mitigare.  Patient normally has get attacks only when he has dietary indiscretions.  He is planning do so this coming Wednesday with rabbit being one of his rigors.  We'll have him start the colchicine that morning continue 4 days to see if this is effective.    2.  Idiopathic gout mild we will give a trial of when necessary medications.  Patient does have normal renal function so you're Lorick would be a contusion or if he should and worsening.  Lotrel when necessary with colchicine, possibly prednisone as a when necessary.  Is on Pradaxa so NSAIDs are not an option.    Much of this encounter note is an electronic transcription/translation of spoken language to printed text.  The electronic translation of spoken language may permit erroneous, or at times, nonsensical words or phrases to be inadvertently transcribed.  Although I have reviewed the note for such errors, some may still exist.

## 2017-02-23 NOTE — ED PROVIDER NOTES
EMERGENCY DEPARTMENT ENCOUNTER    CHIEF COMPLAINT  Chief Complaint: SOA  History given by: pt  History limited by: none  Room Number: 09/09  PMD: Dileep Mccall MD      HPI:  Pt is a 71 y.o. male who presents complaining of SOA onset three days ago. He c/o productive cough and pleuritic chest pain. On 3L O2 at baseline. No further complaints at this time. Hx of CAD.       Duration:  3 days   Onset: gradual   Timing: constant   Location: n/a  Radiation: none  Intensity/Severity: moderate   Progression: worsening   Associated Symptoms: cough, pleuritic chest pain   Aggravating Factors: none  Alleviating Factors: none  Previous Episodes: Hx CAD and CHF.   Treatment before arrival: none    PAST MEDICAL HISTORY  Active Ambulatory Problems     Diagnosis Date Noted   • Bundle branch block, right 02/01/2016   • Coronary artery disease 02/01/2016   • Awareness of heartbeats 02/01/2016   • HLD (hyperlipidemia) 02/01/2016   • BP (high blood pressure) 02/01/2016   • Loud breathing during sleep 02/01/2016   • Tachycardia, paroxysmal 02/01/2016   • Greater trochanteric bursitis of right hip 03/16/2016   • Chest discomfort 03/17/2016   • Ankle edema 03/17/2016   • Dysphagia 04/21/2016   • Chronic congestive heart failure 04/21/2016   • Chest pain at rest 06/30/2016   • Precordial chest pain 09/14/2016   • Paroxysmal a-fib 10/26/2016   • Community acquired bacterial pneumonia 11/06/2016   • Acute respiratory failure with hypoxia 11/07/2016   • Interstitial lung disease 11/10/2016   • Acute and chronic respiratory failure with hypoxia 11/10/2016   • Diastolic CHF, acute on chronic 11/10/2016   • AF (atrial fibrillation) 12/13/2016   • Precordial pain 01/17/2017   • Atrial flutter with rapid ventricular response 01/20/2017   • A-fib 01/31/2017   • Atrial fibrillation 01/31/2017   • Atypical atrial flutter 02/01/2017   • Esophageal spasm 02/13/2017     Resolved Ambulatory Problems     Diagnosis Date Noted   • No Resolved  Ambulatory Problems     Past Medical History   Diagnosis Date   • Abnormal electrocardiogram    • BPH (benign prostatic hyperplasia)    • Bundle branch block, left hemiblock    • Cancer    • Cardiovascular risk factor    • Chest pain    • Chest pain    • CHF (congestive heart failure)    • Dyslipidemia    • Fatigue    • Generalized osteoarthritis of unspecified site    • H/O cardiac radiofrequency ablation    • Hepatitis    • Hyperlipidemia    • Hypertension    • Jaundice    • Mitral valve insufficiency    • Obesity    • Pneumonia    • Right bundle branch block    • Sleep apnea    • Urinary stone        PAST SURGICAL HISTORY  Past Surgical History   Procedure Laterality Date   • Knee arthroscopy Right    • Thromboendarterectomy       CAROTID   • Coronary angioplasty with stent placement     • Tonsillectomy     • Bladder surgery       cancer  approx 3yrs ago   • Colonoscopy     • Endoscopy N/A 7/26/2016     Procedure: ESOPHAGOGASTRODUODENOSCOPY with esophageal dilation #48 and #52 Mckeon dilators;  Surgeon: Sebastian Ho MD;  [facility];  Service:    • Coronary angioplasty with stent placement     • Cardiac electrophysiology procedure N/A 10/26/2016     Procedure: Ablation atrial fibrillation;  Surgeon: Primo Dewitt MD;  Location:  [facility];  Service:    • Cardiac ablation     • Tonsillectomy and adenoidectomy     • Cardiac electrophysiology procedure N/A 2/2/2017     Procedure: Ablation atrial flutter;  Surgeon: Lee Jung MD;  Location:  [facility];  Service:    • Cardiac electrophysiology procedure N/A 2/2/2017     Procedure: Ablation atrial fibrillation;  Surgeon: Lee Jung MD;  Location:  [facility];  Service:        FAMILY HISTORY  Family History   Problem Relation Age of Onset   • Coronary artery disease Other    • Heart disease Mother        SOCIAL HISTORY  Social History     Social History   • Marital status:       Spouse name: N/A   • Number of children: N/A   • Years of education: N/A     Occupational History   • Not on file.     Social History Main Topics   • Smoking status: Former Smoker     Packs/day: 3.50     Years: 22.00     Types: Cigarettes     Quit date: 1987   • Smokeless tobacco: Never Used   • Alcohol use No   • Drug use: No   • Sexual activity: Defer     Other Topics Concern   • Not on file     Social History Narrative       ALLERGIES  Albuterol; Amiodarone; Biaxin [clarithromycin]; Lipitor [atorvastatin]; and Allopurinol    REVIEW OF SYSTEMS  Review of Systems   Constitutional: Negative for chills and fever.   HENT: Negative for sore throat and trouble swallowing.    Eyes: Negative for visual disturbance.   Respiratory: Positive for cough and shortness of breath.    Cardiovascular: Negative for chest pain and leg swelling.   Gastrointestinal: Negative for abdominal pain, diarrhea and vomiting.   Endocrine: Negative.    Genitourinary: Negative for decreased urine volume and frequency.   Musculoskeletal: Negative for neck pain.   Skin: Negative for rash.   Allergic/Immunologic: Negative.    Neurological: Negative for weakness and numbness.   Hematological: Negative.    Psychiatric/Behavioral: Negative.    All other systems reviewed and are negative.      PHYSICAL EXAM  ED Triage Vitals   Temp Heart Rate Resp BP SpO2   02/22/17 1909 02/22/17 1909 02/22/17 1909 02/22/17 1934 02/22/17 1934   99.1 °F (37.3 °C) 91 24 140/75 97 %      Temp src Heart Rate Source Patient Position BP Location FiO2 (%)   02/22/17 1909 02/22/17 1909 02/22/17 1934 02/22/17 1934 --   Tympanic Monitor Sitting Right arm        Physical Exam   Constitutional: He is oriented to person, place, and time and well-developed, well-nourished, and in no distress.   Nontoxic appearing    HENT:   Head: Normocephalic and atraumatic.   Eyes: EOM are normal. Pupils are equal, round, and reactive to light.   Neck: Normal range of motion. Neck supple.    Cardiovascular: Normal rate, regular rhythm and normal heart sounds.    Pulmonary/Chest: Effort normal. No respiratory distress. He has decreased breath sounds. He has wheezes (mild expiratory).   Abdominal: Soft. There is no tenderness. There is no rebound and no guarding.   Musculoskeletal: Normal range of motion. He exhibits no edema.   Neurological: He is alert and oriented to person, place, and time. He has normal sensation and normal strength.   Skin: Skin is warm and dry.   Psychiatric: Mood and affect normal.   Nursing note and vitals reviewed.      LAB RESULTS  Lab Results (last 24 hours)     Procedure Component Value Units Date/Time    CBC & Differential [[id number]] Collected:  02/22/17 2005    Specimen:  Blood Updated:  02/22/17 2017    Narrative:       The following orders were created for panel order CBC & Differential.  Procedure                               Abnormality         Status                     ---------                               -----------         ------                     CBC Auto Differential[[id number]]         Abnormal            Final result                 Please view results for these tests on the individual orders.    Comprehensive Metabolic Panel [[id number]] Collected:  02/22/17 2005    Specimen:  Blood Updated:  02/22/17 2038     Glucose 92 mg/dL      BUN 14 mg/dL      Creatinine 1.07 mg/dL      Sodium 142 mmol/L      Potassium 4.5 mmol/L      Chloride 103 mmol/L      CO2 27.8 mmol/L      Calcium 9.3 mg/dL      Total Protein 6.3 g/dL      Albumin 3.70 g/dL      ALT (SGPT) 20 U/L      AST (SGOT) 11 U/L      Alkaline Phosphatase 57 U/L      Total Bilirubin 0.8 mg/dL      eGFR Non African Amer 69 mL/min/1.73      Globulin 2.6 gm/dL      A/G Ratio 1.4 g/dL      BUN/Creatinine Ratio 13.1      Anion Gap 11.2 mmol/L     BNP [[id number]]  (Normal) Collected:  02/22/17 2005    Specimen:  Blood Updated:  02/22/17 2037     proBNP 482.8 pg/mL     Narrative:       Among patients with  dyspnea, NT-proBNP is highly sensitive for the detection of acute congestive heart failure. In addition NT-proBNP of <300 pg/ml effectively rules out acute congestive heart failure with 99% negative predictive value.    Troponin [[id number]]  (Normal) Collected:  02/22/17 2005    Specimen:  Blood Updated:  02/22/17 2038     Troponin T <0.010 ng/mL     Narrative:       Troponin T Reference Ranges:  Less than 0.03 ng/mL:    Negative for AMI  0.03 to 0.09 ng/mL:      Indeterminant for AMI  Greater than 0.09 ng/mL: Positive for AMI    CBC Auto Differential [[id number]]  (Abnormal) Collected:  02/22/17 2005    Specimen:  Blood Updated:  02/22/17 2017     WBC 12.48 (H) 10*3/mm3      RBC 4.40 (L) 10*6/mm3      Hemoglobin 13.2 (L) g/dL      Hematocrit 41.3 %      MCV 93.9 fL      MCH 30.0 pg      MCHC 32.0 (L) g/dL      RDW 15.7 (H) %      RDW-SD 53.8 fl      MPV 9.5 fL      Platelets 151 10*3/mm3      Neutrophil % 71.3 %      Lymphocyte % 15.5 (L) %      Monocyte % 11.5 %      Eosinophil % 0.5 %      Basophil % 0.2 %      Immature Grans % 1.0 (H) %      Neutrophils, Absolute 8.90 (H) 10*3/mm3      Lymphocytes, Absolute 1.94 10*3/mm3      Monocytes, Absolute 1.43 (H) 10*3/mm3      Eosinophils, Absolute 0.06 10*3/mm3      Basophils, Absolute 0.02 10*3/mm3      Immature Grans, Absolute 0.13 (H) 10*3/mm3           I ordered the above labs and reviewed the results    RADIOLOGY  XR Chest 2 View   Final Result   Stable cardiomegaly without evidence for superimposed acute   process.       This report was finalized on 2/22/2017 8:42 PM by Dr. Alan Marcus MD.               I ordered the above noted radiological studies. Interpreted by radiologist.  Reviewed by me in PACS.       PROCEDURES  Procedures  EKG           EKG time: 7:13 PM  Rhythm/Rate: NSR at 92  RBBB  No acute ischemia     Interpreted Contemporaneously by me, independently viewed  unchanged compared to prior 2/13/17    PROGRESS AND CONSULTS  ED Course     19:40 Ordered  blood work, BNP, Troponin, EKG and CXR for further evaluation.     22:27 Rechecked pt. He is resting comfortable and in NAD with stable vital signs. Discussed unremarkable lab results and negative CXR. Informed of plan to discharge. Pt agrees with course of care. Addressed all questions.       MEDICAL DECISION MAKING  Results were reviewed/discussed with the patient and they were also made aware of online access. Pt also made aware that some labs, such as cultures, will not be resulted during ER visit and follow up with PMD is necessary.     MDM  Number of Diagnoses or Management Options     Amount and/or Complexity of Data Reviewed  Clinical lab tests: ordered and reviewed (BNP - 482  Troponin - negative   )  Tests in the radiology section of CPT®: ordered and reviewed (Stable cardiomegaly without evidence for superimposed acute process.  )  Decide to obtain previous medical records or to obtain history from someone other than the patient: yes  Review and summarize past medical records: yes (Pt was admitted on 1/31/17 for Afib. )    Patient Progress  Patient progress: stable         DIAGNOSIS  Final diagnoses:   Viral URI with cough       DISPOSITION  DISCHARGE    Patient discharged in stable condition.    Reviewed implications of results, diagnosis, meds, responsibility to follow up, warning signs and symptoms of possible worsening, potential complications and reasons to return to the ED.    Patient/Family voiced understanding of above instructions.    Discussed plan for discharge, as there is no emergent indication for admission.  Pt/family is agreeable and understands need for follow up and repeat testing.  Pt is aware that discharge does not mean that nothing is wrong but it indicates no emergency is present that requires admission and they must continue care with follow-up as given below or physician of their choice.     FOLLOW-UP  [place]  [address] [place] [address]  [phone]  [phone]               Medication List      Stop          allopurinol 300 MG tablet   Commonly known as:  ZYLOPRIM       colchicine 0.6 MG capsule capsule   Commonly known as:  MITIGARE               Latest Documented Vital Signs:  As of 10:29 PM  BP- 139/69 HR- 73 Temp- 99.1 °F (37.3 °C) (Tympanic) O2 sat- 93%    --  Documentation assistance provided by kacie Puente for Dr. Stone.  Information recorded by the scribe was done at my direction and has been verified and validated by me.             Olena Puente  02/22/17 2230       Gómez Stone MD  02/23/17 0013

## 2017-03-09 NOTE — PROGRESS NOTES
Subjective   Kenan Garrison is a 71 y.o. male.   Recent hospitalization low potassium mild anemia, blood pressure  History of Present Illness   Doing fairly well last cardio ablation seems of held.  Does note some increased saturation of O2 use Maris's on the pulse oximeter as he does when he is out in on continuous home oxygen at home does desaturate with activity as heart rate goes up into the 150s.  He can sense when he is in A. fib when he is not does not feel the case.  Chronic O2 Secondary to Pulmonary Fibrosis.  Doing Fairly Well Overall No New Different Complaints.  It Did Ease Back on His Blood Pressure Medicine during Hospital Stay Did Have Low Potassium We'll Get Updated Electrolytes As Well As Follow-Up on His Borderline Low Hemoglobin of 13.2.  0n pradaxa  Review of Systems   Respiratory:        Pulmonary fibrosis requiring chronic O2 this is not a new complaint but does desaturate even with his continuous O2 with activity.   All other systems reviewed and are negative.      Objective   Vitals:    03/09/17 0929   BP: 160/80   Pulse: 78   Temp: 97.5 °F (36.4 °C)   SpO2: 90%   Weight: 247 lb 6.4 oz (112 kg)     Physical Exam   Constitutional: He appears well-developed and well-nourished.   Cardiovascular: Normal rate, regular rhythm and normal heart sounds.    Heart is regular rate with occasional irregularity by auscultation this is a known entity with patient who's had cardiac ablation ×2 for atrial failed with the last one apparently successful.   Pulmonary/Chest: Effort normal and breath sounds normal.   Abdominal: Soft. Bowel sounds are normal.   Nursing note and vitals reviewed.      Lab Results   Component Value Date    INR 1.22 (H) 02/03/2017    INR 1.32 (H) 02/01/2017    INR 1.09 01/20/2017       Procedures    Assessment/Plan   1.  Hypertension controlled plan continue present medication    2.  Hypokalemia get updated BM P    3.  Anemia plan update his CBC    With him predisposed date  February also check magnesium for electrolyte stability to bend masses heart irritability.    Follow-up probably 3 months and as needed    Much of this encounter note is an electronic transcription/translation of spoken language to printed text.  The electronic translation of spoken language may permit erroneous, or at times, nonsensical words or phrases to be inadvertently transcribed.  Although I have reviewed the note for such errors, some may still exist.

## 2017-03-13 NOTE — TELEPHONE ENCOUNTER
Gave pt appt for today    ----- Message from Juany Marie sent at 3/13/2017  8:03 AM EDT -----  Contact: [phone]  PT WANTS ANTIBIOTIC FOR UPPER RESPIRATORY CONGESTION, HE USUALLY USES LEVAQUIN. AND HE USES [facility] IN [place]. BUT HE WOULD REALLY LOVE TO COME IN IF YOU HAVE ANY OPENINGS

## 2017-03-13 NOTE — TELEPHONE ENCOUNTER
Dr rothman sent in ceftin 250 bid    ----- Message from Mariama Herrera MA sent at 3/13/2017  3:55 PM EDT -----  Contact: [facility] - [phone]      ----- Message -----     From: Rosalia Tyson     Sent: 3/13/2017   2:39 PM       To: Lupe Chan MA    levoFLOXacin (LEVAQUIN) 500 MG tablet WAS CALLED INTO PHARMACY AND WANTED TO LET DR. ROTHMAN KNOW THAT PATIENT IS ON SOTALOL AND MEDICATIONS ARE INTERACTION TAKEN TOGETHER

## 2017-03-13 NOTE — PROGRESS NOTES
Subjective   Kenan Garrison is a 71 y.o. male.   Increasing sinus pressure predominately frontal sinuses mild cough also  History of Present Illness    had flu shot in fall, had c scope 9 years ago is due at the 10 year renetta.  Frontal sinus pressure with drainage lungs have not reacted yet is on maintenance 20 mg prednisone daily.  No associated temperature  Review of Systems   HENT: Positive for congestion and postnasal drip.    Respiratory: Positive for cough.    All other systems reviewed and are negative.      Objective   There were no vitals filed for this visit.      Physical Exam   Constitutional: He appears well-developed and well-nourished.   Chronic O2 by nasal cannula   HENT:   Right Ear: External ear normal.   Left Ear: External ear normal.   Mouth/Throat: Oropharynx is clear and moist.   Cardiovascular: Normal rate, regular rhythm and normal heart sounds.    Pulmonary/Chest: Effort normal and breath sounds normal.   No rales or wheezes lungs breath sounds approximately good   Nursing note and vitals reviewed.      Lab Results   Component Value Date    INR 1.22 (H) 02/03/2017    INR 1.32 (H) 02/01/2017    INR 1.09 01/20/2017       Procedures    Assessment/Plan   1.  Frontal sinusitis plan issue or giving of Levaquin for 10 days but we have conflict with sotalol this was changed to Ceftin 250 twice a day ×10.    2.  Cough plan Robitussin-AC 2 teaspoons every 4 hours when necessary cough 4 ounces.    Much of this encounter note is an electronic transcription/translation of spoken language to printed text.  The electronic translation of spoken language may permit erroneous, or at times, nonsensical words or phrases to be inadvertently transcribed.  Although I have reviewed the note for such errors, some may still exist.

## 2017-03-27 NOTE — ED NOTES
Left sided chest pain radiates to left arm intermittent since yesterday     Enrike Curry RN  03/26/17 0205

## 2017-03-27 NOTE — ED PROVIDER NOTES
EMERGENCY DEPARTMENT ENCOUNTER    CHIEF COMPLAINT  Chief Complaint: Chest pain  History given by: patient   History limited by: n/a  Room Number: 24/24  PMD: MD Dr Lisa Bishop, cardiologist  Dr Jung    HPI:  Pt is a 71 y.o. male who presents complaining of intermittent left sided chest pain that onset 2-3 days ago. Pt states that the pain radiates to his left arm. Pt states that the pain was 9/10 at home, but it is currently at 4/10. Pt also reports elevated HR at home. Pt states that he is on 4L of O2 at home. Hx of CAD, hypertension, A-fib, and CHF. Hx of 2 cardiac ablations.     Duration:  2-3 days   Onset: gradual  Timing: intermittent  Location: left chest   Radiation: left upper extremity   Quality: pain  Intensity/Severity: severe   Progression: improved   Associated Symptoms: rapid HR  Aggravating Factors: none  Alleviating Factors: none  Previous Episodes: Hx of CAD, hypertension, A-fib, and CHF  Treatment before arrival: none    PAST MEDICAL HISTORY  Active Ambulatory Problems     Diagnosis Date Noted   • Bundle branch block, right 02/01/2016   • Coronary artery disease 02/01/2016   • Awareness of heartbeats 02/01/2016   • HLD (hyperlipidemia) 02/01/2016   • BP (high blood pressure) 02/01/2016   • Loud breathing during sleep 02/01/2016   • Tachycardia, paroxysmal 02/01/2016   • Greater trochanteric bursitis of right hip 03/16/2016   • Chest discomfort 03/17/2016   • Ankle edema 03/17/2016   • Dysphagia 04/21/2016   • Chronic congestive heart failure 04/21/2016   • Chest pain at rest 06/30/2016   • Precordial chest pain 09/14/2016   • Paroxysmal a-fib 10/26/2016   • Community acquired bacterial pneumonia 11/06/2016   • Acute respiratory failure with hypoxia 11/07/2016   • Interstitial lung disease 11/10/2016   • Acute and chronic respiratory failure with hypoxia 11/10/2016   • Diastolic CHF, acute on chronic 11/10/2016   • AF (atrial fibrillation) 12/13/2016   • Precordial pain  01/17/2017   • Atrial flutter with rapid ventricular response 01/20/2017   • A-fib 01/31/2017   • Atrial fibrillation 01/31/2017   • Atypical atrial flutter 02/01/2017   • Esophageal spasm 02/13/2017     Resolved Ambulatory Problems     Diagnosis Date Noted   • No Resolved Ambulatory Problems     Past Medical History:   Diagnosis Date   • Abnormal electrocardiogram    • Atrial fibrillation    • BPH (benign prostatic hyperplasia)    • Bundle branch block, left hemiblock    • Cancer    • Cardiovascular risk factor    • Chest pain    • Chest pain    • CHF (congestive heart failure)    • Coronary artery disease    • Dyslipidemia    • Fatigue    • Generalized osteoarthritis of unspecified site    • H/O cardiac radiofrequency ablation    • Hepatitis    • Hyperlipidemia    • Hypertension    • Jaundice    • Mitral valve insufficiency    • Obesity    • Pneumonia    • Right bundle branch block    • Sleep apnea    • Urinary stone        PAST SURGICAL HISTORY  Past Surgical History:   Procedure Laterality Date   • BLADDER SURGERY      cancer  approx 3yrs ago   • CARDIAC ABLATION     • CARDIAC ELECTROPHYSIOLOGY PROCEDURE N/A 10/26/2016    Procedure: Ablation atrial fibrillation;  Surgeon: Primo Dewitt MD;  Location: [facility] INVASIVE LOCATION;  Service:    • CARDIAC ELECTROPHYSIOLOGY PROCEDURE N/A 2/2/2017    Procedure: Ablation atrial flutter;  Surgeon: Lee Jung MD;  Location: [facility] CATH INVASIVE LOCATION;  Service:    • CARDIAC ELECTROPHYSIOLOGY PROCEDURE N/A 2/2/2017    Procedure: Ablation atrial fibrillation;  Surgeon: Lee Jung MD;  Location: [facility] CATH INVASIVE LOCATION;  Service:    • COLONOSCOPY     • CORONARY ANGIOPLASTY WITH STENT PLACEMENT     • CORONARY ANGIOPLASTY WITH STENT PLACEMENT     • ENDOSCOPY N/A 7/26/2016    Procedure: ESOPHAGOGASTRODUODENOSCOPY with esophageal dilation #48 and #52 Mckeon dilators;  Surgeon: Sebastian Ho MD;  Location: [facility] ENDOSCOPY;  Service:    • KNEE  ARTHROSCOPY Right    • THROMBOENDARTERECTOMY      CAROTID   • TONSILLECTOMY     • TONSILLECTOMY AND ADENOIDECTOMY         FAMILY HISTORY  Family History   Problem Relation Age of Onset   • Coronary artery disease Other    • Heart disease Mother        SOCIAL HISTORY  Social History     Social History   • Marital status:      Spouse name: N/A   • Number of children: N/A   • Years of education: N/A     Occupational History   • Not on file.     Social History Main Topics   • Smoking status: Former Smoker     Packs/day: 3.50     Years: 22.00     Types: Cigarettes     Quit date: 1987   • Smokeless tobacco: Never Used   • Alcohol use No   • Drug use: No   • Sexual activity: Defer     Other Topics Concern   • Not on file     Social History Narrative       ALLERGIES  Albuterol; Amiodarone; Biaxin [clarithromycin]; Lipitor [atorvastatin]; and Allopurinol    REVIEW OF SYSTEMS  Review of Systems   Constitutional: Negative for chills and fever.   HENT: Negative for congestion and sore throat.    Eyes: Negative.    Respiratory: Negative for cough and shortness of breath.    Cardiovascular: Positive for chest pain. Negative for leg swelling.   Gastrointestinal: Negative for abdominal pain, diarrhea and vomiting.   Genitourinary: Negative for difficulty urinating and dysuria.   Musculoskeletal: Negative for back pain and neck pain.   Skin: Negative for rash and wound.   Allergic/Immunologic: Negative.    Neurological: Negative for dizziness, weakness, numbness and headaches.   Psychiatric/Behavioral: Negative.    All other systems reviewed and are negative.      PHYSICAL EXAM  ED Triage Vitals   Temp Heart Rate Resp BP SpO2   03/26/17 2334 03/26/17 2334 03/26/17 2334 03/26/17 2341 03/26/17 2334   97.2 °F (36.2 °C) 64 20 174/95 72 %      Temp src Heart Rate Source Patient Position BP Location FiO2 (%)   -- 03/26/17 2334 -- 03/26/17 2341 --    Monitor  Right arm        Physical Exam   Constitutional: He is oriented to person,  place, and time and well-developed, well-nourished, and in no distress.   HENT:   Head: Normocephalic and atraumatic.   Eyes: EOM are normal. Pupils are equal, round, and reactive to light.   Neck: Normal range of motion. Neck supple.   Cardiovascular: Normal rate, regular rhythm and normal heart sounds.    Pulmonary/Chest: Effort normal and breath sounds normal. No respiratory distress.   Abdominal: Soft. There is no tenderness. There is no rebound and no guarding.   Musculoskeletal: Normal range of motion. He exhibits no edema.   Neurological: He is alert and oriented to person, place, and time. He has normal sensation and normal strength.   Skin: Skin is warm and dry.   Psychiatric: Mood and affect normal.   Nursing note and vitals reviewed.      LAB RESULTS  Lab Results (last 24 hours)     Procedure Component Value Units Date/Time    CBC & Differential [[id number]] Collected:  03/26/17 2350    Specimen:  Blood Updated:  03/27/17 0007    Narrative:       The following orders were created for panel order CBC & Differential.  Procedure                               Abnormality         Status                     ---------                               -----------         ------                     CBC Auto Differential[[id number]]         Abnormal            Final result                 Please view results for these tests on the individual orders.    Comprehensive Metabolic Panel [[id number]]  (Abnormal) Collected:  03/26/17 2350    Specimen:  Blood Updated:  03/27/17 0024     Glucose 109 (H) mg/dL      BUN 12 mg/dL      Creatinine 1.13 mg/dL      Sodium 143 mmol/L      Potassium 4.1 mmol/L      Chloride 103 mmol/L      CO2 26.2 mmol/L      Calcium 9.8 mg/dL      Total Protein 6.6 g/dL      Albumin 4.00 g/dL      ALT (SGPT) 28 U/L      AST (SGOT) 19 U/L      Alkaline Phosphatase 59 U/L      Total Bilirubin 0.3 mg/dL      eGFR Non African Amer 64 mL/min/1.73      Globulin 2.6 gm/dL      A/G Ratio 1.5 g/dL       BUN/Creatinine Ratio 10.6     Anion Gap 13.8 mmol/L     BNP [[id number]]  (Normal) Collected:  03/26/17 2350    Specimen:  Blood Updated:  03/27/17 0023     proBNP 374.3 pg/mL     Narrative:       Among patients with dyspnea, NT-proBNP is highly sensitive for the detection of acute congestive heart failure. In addition NT-proBNP of <300 pg/ml effectively rules out acute congestive heart failure with 99% negative predictive value.    Troponin [[id number]]  (Normal) Collected:  03/26/17 2350    Specimen:  Blood Updated:  03/27/17 0024     Troponin T <0.010 ng/mL     Narrative:       Troponin T Reference Ranges:  Less than 0.03 ng/mL:    Negative for AMI  0.03 to 0.09 ng/mL:      Indeterminant for AMI  Greater than 0.09 ng/mL: Positive for AMI    CBC Auto Differential [[id number]]  (Abnormal) Collected:  03/26/17 2350    Specimen:  Blood Updated:  03/27/17 0007     WBC 10.35 10*3/mm3      RBC 4.42 (L) 10*6/mm3      Hemoglobin 13.0 (L) g/dL      Hematocrit 41.3 %      MCV 93.4 fL      MCH 29.4 pg      MCHC 31.5 (L) g/dL      RDW 14.5 %      RDW-SD 49.4 fl      MPV 9.2 fL      Platelets 187 10*3/mm3      Neutrophil % 65.5 %      Lymphocyte % 23.0 %      Monocyte % 9.2 %      Eosinophil % 0.6 %      Basophil % 0.3 %      Immature Grans % 1.4 (H) %      Neutrophils, Absolute 6.79 10*3/mm3      Lymphocytes, Absolute 2.38 10*3/mm3      Monocytes, Absolute 0.95 10*3/mm3      Eosinophils, Absolute 0.06 10*3/mm3      Basophils, Absolute 0.03 10*3/mm3      Immature Grans, Absolute 0.14 (H) 10*3/mm3     Troponin [[id number]]  (Normal) Collected:  03/27/17 0140    Specimen:  Blood Updated:  03/27/17 0232     Troponin T <0.010 ng/mL     Narrative:       Troponin T Reference Ranges:  Less than 0.03 ng/mL:    Negative for AMI  0.03 to 0.09 ng/mL:      Indeterminant for AMI  Greater than 0.09 ng/mL: Positive for AMI          I ordered the above labs and reviewed the results    RADIOLOGY  XR Chest 1 View   Preliminary Result   No acute  findings. Overall no significant change.               I ordered the above noted radiological studies. Interpreted by radiologist. Reviewed by me in PACS.       PROCEDURES  Procedures    EKG           EKG time: 23:58  Rhythm/Rate: Paced rhythm at 69  P waves and [place]:   QRS, axis: RBBB     Interpreted Contemporaneously by me, independently viewed  Unchanged compared to prior 2/22/17    PROGRESS AND CONSULTS  ED Course     23:43  Labs, CXR, and EKG ordered for further evaluation.     00:28  BP- 174/95 HR- 64 Temp- 97.2 °F (36.2 °C) O2 sat- 96%  Advised pt that the CXR shows NAD. Awaiting lab results, but plan to consult cardiology. Pt understands and agrees with the plan, all questions answered.    00:38  NTG ordered to treat pain. Call placed to cardiology for consult.     01:01  Discussed pt's case with Lisa (cardiology), who states that the pt's last heart cath was negative and if the repeat troponin is negative, pt can be discharged and f/u as an outpatient.    01:05  Repeat troponin ordered     02:46  Initial and repeat troponin are negative. Pt will be discharged.    03:08  BP- 145/81 HR- 61 Temp- 97.2 °F (36.2 °C) O2 sat- 93%  Rechecked the patient who is in NAD and is resting comfortably. Advised pt of my consult with Dr Gonzalez. Pt has negative cardia enzymes x2. Pt will be discharged. Pt understands and agrees with the plan, all questions answered.    MEDICAL DECISION MAKING  Results were reviewed/discussed with the patient and they were also made aware of online access. Pt also made aware that some labs, such as cultures, will not be resulted during ER visit and follow up with PMD is necessary.     MDM  Number of Diagnoses or Management Options     Amount and/or Complexity of Data Reviewed  Clinical lab tests: ordered and reviewed (BNP is 374.3  Initial and repeat troponin are <0.010  )  Tests in the radiology section of CPT®: ordered and reviewed (CXR shows NAD)  Tests in the medicine section of  CPT®: ordered and reviewed (See EKG procedure note. )  Decide to obtain previous medical records or to obtain history from someone other than the patient: yes  Independent visualization of images, tracings, or specimens: yes    Patient Progress  Patient progress: stable         DIAGNOSIS  Final diagnoses:   Chest pain, unspecified type       DISPOSITION  DISCHARGE    Patient discharged in stable condition.    Reviewed implications of results, diagnosis, meds, responsibility to follow up, warning signs and symptoms of possible worsening, potential complications and reasons to return to ER.    Patient/Family voiced understanding of above instructions.    Discussed plan for discharge, as there is no emergent indication for admission.  Pt/family is agreeable and understands need for follow up and repeat testing.  Pt is aware that discharge does not mean that nothing is wrong but it indicates no emergency is present that requires admission and they must continue care with follow-up as given below or physician of their choice.     FOLLOW-UP  Dileep Mccall Jr., MD  [address] [facility] [id number] [phone]          Herman Gonzalez MD  [address] [id number] [phone]    Schedule an appointment as soon as possible for a visit          Schedule an appointment as soon as possible for a visit in 1 day           Medication List      Notice     No changes were made to your prescriptions during this visit.        Latest Documented Vital Signs:  As of 2:54 AM  BP- 145/81 HR- 61 Temp- 97.2 °F (36.2 °C) O2 sat- 93%    --  Documentation assistance provided by kacie Regalado for Dr Donohue.  Information recorded by the kacie was done at my direction and has been verified and validated by me.        Heidy Regalado  03/27/17 0311       Marbin Donohue MD  03/27/17 6090

## 2017-03-29 NOTE — TELEPHONE ENCOUNTER
----- Message from Luz Rutledge sent at 3/27/2017  8:38 AM EDT -----  Regarding: NEEDS AN APPT WAS IN THE ER LAST NIGHT  Contact: [phone]  WHEN DO YOU WANT TO SEE HIM?      Per C pt to do cardiolite  Luz   Can you please set up for pt. Orders are in

## 2017-04-17 NOTE — TELEPHONE ENCOUNTER
INFORMED NOT RECEIVED YET WHEN WE GET IT I WILL HAVE DR. ROHTMAN SIGN AND I WILL FAX BACK    ----- Message from Rosalia Tyson sent at 4/17/2017  3:24 PM EDT -----  Contact: PATIENT [phone]  ANNY IS TAKING CARE OF PATIENTS OXYGEN. PATIENT IS GOING OUT OF TOWN AND BRISSA TODD NEEDS CERTIFICATE SIGNED. ANNY WAS SUPPOSE TO FAX THIS OVER. ANNY NEEDS THIS TODAY OR TOMORROW PLEASE

## 2017-04-21 NOTE — PROGRESS NOTES
Procedure   [facility]  Cardiology Progress Note    Patient Identification:  Name:Kenan Garrison  Age:71 y.o.  Sex: male  :  1945  MRN: [id number]           2017    Subjective:    Chief Complaint   Patient presents with   • Atrial Fibrillation   shortness of breath    HPI     He has history of paraxysmal atrial tachycardia , he had ablation twice , the last one done by Erich and since then he is doing ok. He was recently in the hospital with some chest pain. 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 He is on prednisone.  Review of Systems   Constitution: Positive for malaise/fatigue. Negative for chills and fever.   HENT: Positive for headaches.    Eyes: Negative for blurred vision and double vision.   Cardiovascular: Positive for chest pain, irregular heartbeat, leg swelling and palpitations.   Respiratory: Positive for shortness of breath and snoring.    Skin: Negative for color change.   Musculoskeletal: Positive for arthritis and joint pain.   Gastrointestinal: Positive for abdominal pain. Negative for nausea and vomiting.   Neurological: Positive for light-headedness. Negative for dizziness and numbness.   Psychiatric/Behavioral: Negative for depression.       The following portions of the patient's history were reviewed and updated as appropriate: allergies, current medications, past family history, past medical history, past social history,and problem list.    Past Medical History:   Diagnosis Date   • Abnormal electrocardiogram    • Atrial fibrillation    • BPH (benign prostatic hyperplasia)    • Bundle branch block, left hemiblock    • Cancer     bladder   • Cardiovascular risk factor     Diabetes.  Positive hypertension.  Nonsmoker.  Positive family history of early CAD.  Positive hyperlipidemia   • Chest pain    • Chest pain    • CHF (congestive heart failure)     per patient record   •  Coronary artery disease    • Dyslipidemia    • Fatigue    • Generalized osteoarthritis of unspecified site    • H/O cardiac radiofrequency ablation    • Hepatitis    • Hyperlipidemia    • Hypertension    • Jaundice     age 18   • Mitral valve insufficiency    • Obesity    • Pneumonia    • Right bundle branch block    • Sleep apnea    • Urinary stone        Past Surgical History:   Procedure Laterality Date   • BLADDER SURGERY      cancer  approx 3yrs ago   • CARDIAC ABLATION     • CARDIAC ELECTROPHYSIOLOGY PROCEDURE N/A 10/26/2016    Procedure: Ablation atrial fibrillation;  Surgeon: Primo Dewitt MD;  Location: [facility] CATH INVASIVE LOCATION;  Service:    • CARDIAC ELECTROPHYSIOLOGY PROCEDURE N/A 2/2/2017    Procedure: Ablation atrial flutter;  Surgeon: Lee Jung MD;  Location: [facility] CATH INVASIVE LOCATION;  Service:    • CARDIAC ELECTROPHYSIOLOGY PROCEDURE N/A 2/2/2017    Procedure: Ablation atrial fibrillation;  Surgeon: Lee Jung MD;  Location: [facility] CATH INVASIVE LOCATION;  Service:    • COLONOSCOPY     • CORONARY ANGIOPLASTY WITH STENT PLACEMENT     • CORONARY ANGIOPLASTY WITH STENT PLACEMENT     • ENDOSCOPY N/A 7/26/2016    Procedure: ESOPHAGOGASTRODUODENOSCOPY with esophageal dilation #48 and #52 Mckeon dilators;  Surgeon: Sebastian Ho MD;  Location: [facility] ENDOSCOPY;  Service:    • KNEE ARTHROSCOPY Right    • THROMBOENDARTERECTOMY      CAROTID   • TONSILLECTOMY     • TONSILLECTOMY AND ADENOIDECTOMY         Social History     Social History   • Marital status:      Spouse name: N/A   • Number of children: N/A   • Years of education: N/A     Occupational History   • Not on file.     Social History Main Topics   • Smoking status: Former Smoker     Packs/day: 3.50     Years: 22.00     Types: Cigarettes     Quit date: 1987   • Smokeless tobacco: Never Used   • Alcohol use No   • Drug use: No   • Sexual activity: Defer     Other Topics Concern   • Not on file     Social History Narrative        Family History   Problem Relation Age of Onset   • Coronary artery disease Other    • Heart disease Mother        Scheduled Meds:    Current Outpatient Prescriptions:   •  dabigatran etexilate (PRADAXA) 150 MG capsu, Take 1 capsule by mouth Every 12 (Twelve) Hours., Disp: 180 capsule, Rfl: 1  •  furosemide (LASIX) 80 MG tablet, Take 1 tablet by mouth Daily., Disp: , Rfl:   •  metoprolol succinate XL (TOPROL-XL) 50 MG 24 hr tablet, TAKE ONE TABLET BY MOUTH ONCE DAILY, Disp: 30 tablet, Rfl: 2  •  nitroglycerin (NITROSTAT) 0.4 MG SL tablet, 1 under the tongue as needed for angina, may repeat q5mins for up three doses, Disp: 100 tablet, Rfl: 1  •  omeprazole (priLOSEC) 40 MG capsule, Take 1 capsule by mouth Daily., Disp: 90 capsule, Rfl: 1  •  potassium chloride (K-DUR,KLOR-CON) 20 MEQ CR tablet, Take 20 mEq by mouth 3 (Three) Times a Week., Disp: , Rfl:   •  pravastatin (PRAVACHOL) 40 MG tablet, Take 1 tablet by mouth Every Night., Disp: 30 tablet, Rfl: 2  •  predniSONE (DELTASONE) 20 MG tablet, Take 2 tablets for 2 days and 1 tablet for 3 days, Disp: 7 tablet, Rfl: 0  •  traMADol (ULTRAM) 50 MG tablet, Take 50 mg by mouth Every 6 (Six) Hours As Needed for moderate pain (4-6)., Disp: , Rfl:     Objective:  /70  Pulse 83  Wt 247 lb (112 kg)  BMI 33.5 kg/m2     Physical Exam  Physical Exam:    General: No acute distress.    Skin: Warm and dry, no diaphoresis noted   HEENT: No ptosis; external ear and nose normal; oral mucosa moist   Neck: Supple; no carotid bruits; no JVD, Trachea mid line   Heart: S1S2 regular rate and rhythm; no murmurs; no gallop or rub appreciated, apex not displaced   Chest: Respirations regular, unlabored at rest, bilateral breath sounds have good air entry; no  wheezes auscultated.     Abdomen: Soft, non-tender, non-distended, positive bowel sounds  No hepatosplenomegaly   Extremities: Bilateral lower extremities have no pre-tibial pitting edema; Radials are palpable   Neurological:  Alert and oriented x 3; no new motor deficits,           ECG 12 Lead  Date/Time: 4/21/2017 11:29 AM  Performed by: LAVONNE GONZALEZ  Authorized by: LAVONNE GONZALEZ   Rhythm: sinus rhythm  Rate: normal  Conduction: right bundle branch block  QRS axis: right             Comparison to previous ECG:  Similar to previous ecg     Assessment:  Problem List Items Addressed This Visit        Cardiovascular and Mediastinum    Bundle branch block, right - Primary    Coronary artery disease    HLD (hyperlipidemia)    Tachycardia, paroxysmal    Chronic congestive heart failure    Paroxysmal a-fib          Plan:    Overall clinically has been stable.  His main complaint is a class III dyspnea on exertion.  EKG shows right bundle branch block with right axis deviation.  His blood pressure is stable.  At this time I did ask him to increase the Lasix to 80 mg alternating with 40 mg every other day.  He can also increase the Toprol-XL to 50 mg twice daily.  Again dietary modification and weight loss is reemphasized.      I not only counseled the patient today on the risk factor modification of significant factors noted in the assessment and plan, and I also recommended that the patient reduce salt and saturated animal fat intake in diet, about the advantages of plant based diet, as well as to perform scheduled exercise on a regular basis.    04/21/2017  Ever Gonzalez MD, FACC

## 2017-06-06 NOTE — PROGRESS NOTES
Procedure   [facility]  Cardiology Progress Note    Patient Identification:  Name:Kenan Garrison  Age:72 y.o.  Sex: male  :  1945  MRN: [id number]           2017    Subjective:    Chief Complaint   Patient presents with   • Coronary Artery Disease   Abnormal ECG    HPI     He has history of paraxysmal atrial tachycardia , he had ablation twice , the last one done by Erich and since then he is doing ok. He was recently in the hospital with some chest pain. 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 He is on prednisone    Clinically he has been stable with no angina. His ECG shows RBBB/RAD. In sinus now. No angina. He does have mild right heart dilatation. His left side looks ok.  No syncope.    He still has some exertiional chest pains but they are getting better. Has normal stress test and repeat cath before. He prefers not to have another cath done as he feels improving now.  Part of the chest tightness may be due to pulmonary in etiology.      Review of Systems   Constitution: Positive for malaise/fatigue. Negative for chills and fever.   HENT: Negative for headaches.    Eyes: Negative for blurred vision and double vision.   Cardiovascular: Positive for chest pain, irregular heartbeat and leg swelling (occ). Negative for palpitations.   Respiratory: Positive for shortness of breath and snoring.    Skin: Negative for color change.   Gastrointestinal: Positive for abdominal pain. Negative for nausea and vomiting.   Neurological: Positive for light-headedness. Negative for dizziness and numbness.       The following portions of the patient's history were reviewed and updated as appropriate: allergies, current medications, past family history, past medical history, past social history,and problem list.    Past Medical History:   Diagnosis Date   • Abnormal electrocardiogram    • Atrial fibrillation    • BPH  (benign prostatic hyperplasia)    • Bundle branch block, left hemiblock    • Cancer     bladder   • Cardiovascular risk factor     Diabetes.  Positive hypertension.  Nonsmoker.  Positive family history of early CAD.  Positive hyperlipidemia   • Chest pain    • Chest pain    • CHF (congestive heart failure)     per patient record   • Coronary artery disease    • Dyslipidemia    • Fatigue    • Generalized osteoarthritis of unspecified site    • H/O cardiac radiofrequency ablation    • Hepatitis    • Hyperlipidemia    • Hypertension    • Jaundice     age 18   • Mitral valve insufficiency    • Obesity    • Pneumonia    • Right bundle branch block    • Sleep apnea    • Urinary stone        Past Surgical History:   Procedure Laterality Date   • BLADDER SURGERY      cancer  approx 3yrs ago   • CARDIAC ABLATION     • CARDIAC ELECTROPHYSIOLOGY PROCEDURE N/A 10/26/2016    Procedure: Ablation atrial fibrillation;  Surgeon: Primo Dewitt MD;  Location: [facility] CATH INVASIVE LOCATION;  Service:    • CARDIAC ELECTROPHYSIOLOGY PROCEDURE N/A 2/2/2017    Procedure: Ablation atrial flutter;  Surgeon: Lee Jung MD;  Location: [facility] CATH INVASIVE LOCATION;  Service:    • CARDIAC ELECTROPHYSIOLOGY PROCEDURE N/A 2/2/2017    Procedure: Ablation atrial fibrillation;  Surgeon: Lee Jung MD;  Location: [facility] CATH INVASIVE LOCATION;  Service:    • COLONOSCOPY     • CORONARY ANGIOPLASTY WITH STENT PLACEMENT     • CORONARY ANGIOPLASTY WITH STENT PLACEMENT     • ENDOSCOPY N/A 7/26/2016    Procedure: ESOPHAGOGASTRODUODENOSCOPY with esophageal dilation #48 and #52 Mckeon dilators;  Surgeon: Sebastian Ho MD;  Location: [facility] ENDOSCOPY;  Service:    • KNEE ARTHROSCOPY Right    • THROMBOENDARTERECTOMY      CAROTID   • TONSILLECTOMY     • TONSILLECTOMY AND ADENOIDECTOMY         Social History     Social History   • Marital status:      Spouse name: N/A   • Number of children: N/A   • Years of education: N/A     Occupational  History   • Not on file.     Social History Main Topics   • Smoking status: Former Smoker     Packs/day: 3.50     Years: 22.00     Types: Cigarettes     Quit date: 1987   • Smokeless tobacco: Never Used   • Alcohol use No   • Drug use: No   • Sexual activity: Defer     Other Topics Concern   • Not on file     Social History Narrative       Family History   Problem Relation Age of Onset   • Coronary artery disease Other    • Heart disease Mother        Scheduled Meds:    Current Outpatient Prescriptions:   •  ciclopirox (LOPROX) 0.77 % suspension, , Disp: , Rfl:   •  ciclopirox (LOPROX) 1 % shampoo, , Disp: , Rfl:   •  dabigatran etexilate (PRADAXA) 150 MG capsu, Take 1 capsule by mouth Every 12 (Twelve) Hours., Disp: 180 capsule, Rfl: 1  •  furosemide (LASIX) 80 MG tablet, Take 1 tablet by mouth Every Other Day. (Patient taking differently: Take 80 mg by mouth Every Other Day. 80 mg alter with 40 mg), Disp: , Rfl:   •  metoprolol succinate XL (TOPROL-XL) 50 MG 24 hr tablet, Take 1 tablet by mouth 2 (Two) Times a Day., Disp: 120 tablet, Rfl: 2  •  omeprazole (priLOSEC) 40 MG capsule, Take 1 capsule by mouth Daily., Disp: 90 capsule, Rfl: 1  •  potassium chloride (K-DUR,KLOR-CON) 20 MEQ CR tablet, Take 20 mEq by mouth Daily., Disp: , Rfl:   •  pravastatin (PRAVACHOL) 40 MG tablet, Take 1 tablet by mouth Daily., Disp: 30 tablet, Rfl: 1  •  predniSONE (DELTASONE) 20 MG tablet, Take 2 tablets for 2 days and 1 tablet for 3 days, Disp: 7 tablet, Rfl: 0  •  tamsulosin (FLOMAX) 0.4 MG capsule 24 hr capsule, Take 1 capsule by mouth Every Night., Disp: , Rfl:   •  traMADol (ULTRAM) 50 MG tablet, Take 50 mg by mouth Every 6 (Six) Hours As Needed for moderate pain (4-6)., Disp: , Rfl:     Objective:  /79  Pulse 61  Wt 242 lb (110 kg)  BMI 35.74 kg/m2     Physical Exam  Physical Exam:    General: No acute distress. On home oxygen   Skin: Warm and dry, no diaphoresis noted   HEENT: No ptosis; external ear and nose normal;  oral mucosa moist   Neck: Supple; no carotid bruits; no JVD, Trachea mid line   Heart: S1S2 regular rate and rhythm; soft systolic murmurs; no gallop or rub appreciated, apex not displaced   Chest: Respirations regular, unlabored at rest, bilateral breath sounds have good air entry; no  wheezes auscultated.     Abdomen: Soft, non-tender, non-distended, positive bowel sounds  No hepatosplenomegaly   Extremities: Bilateral lower extremities have no pre-tibial pitting edema; Radials are palpable   Neurological: Alert and oriented x 3; no new motor deficits,           ECG 12 Lead  Date/Time: 6/6/2017 3:09 PM  Performed by: LAVONNE GONZALEZ  Authorized by: LAVONNE GONZALEZ   Rhythm: sinus rhythm  Rate: normal  QRS axis: right  Clinical impression: abnormal ECG           Comparison to previous ECG:  Similar to previous ecg     Assessment:  Problem List Items Addressed This Visit        Cardiovascular and Mediastinum    Bundle branch block, right - Primary    Coronary artery disease    HLD (hyperlipidemia)    Tachycardia, paroxysmal          Plan:    Cllinically he has been stable with no angina. His ECG shows RBBBwith RAD. I will continue the current medical regimen.  No edema of the legs. He is intolerant to higher dose statins. May consider adding zetia if LDL is still high. I asked him to follow up with his PMD. No new complaints.  He thinks his chest pain is better. Not much fluid overload.      I not only counseled the patient today on the risk factor modification of significant factors noted in the assessment and plan, and I also recommended that the patient reduce salt and saturated animal fat intake in diet, about the advantages of plant based diet, as well as to perform scheduled exercise on a regular basis.    06/06/2017  Ever Gonzalez MD, FACC

## 2017-06-19 PROBLEM — R07.9 CHEST PAIN: Status: ACTIVE | Noted: 2017-01-01

## 2017-06-19 NOTE — PROGRESS NOTES
Subjective   Kenan Garrison is a 72 y.o. male.   Lightheadedness whenever stance or active having chest pain with minimal activity including standing or walking hypoxia as measured by O2 sats with activity  History of Present Illness   Patient's O2 sats from 94% or so at rest on room air any time he stands much less walks his O2 sats desaturate with walking it was suppose to 70s that he didn't recover fairly quickly when he sits.  He also notes that he has some chest pain or tightness mid chest that occurs when he is active this was relieved with a few minutes of rest.  Has had 2 cardiac stents placed in the past.  Is short of breath also has history of interstitial lung disease as well as CHF predominantly diastolic in nature.  The symptoms were magnified with recent visit to his parent's grave site which is rather h[place].  Lightheadedness mainly with turning his head up for or with standing.  Does not seem to bother much better since that side are low he describes as feeling somewhat twitchy with this.  We did not observe any nystagmus with EOM testing    Review of Systems   Constitutional: Negative.    HENT: Negative.    Eyes: Negative.    Respiratory: Positive for apnea and shortness of breath.    Cardiovascular: Positive for chest pain and palpitations.   Gastrointestinal: Negative.    Endocrine: Negative.    Genitourinary: Negative.    Musculoskeletal: Negative.    Skin: Negative.    Allergic/Immunologic: Negative.    Neurological: Positive for dizziness and light-headedness.   Hematological: Negative.    Psychiatric/Behavioral: Negative.        Objective   Vitals:    06/19/17 1350   BP: 122/64   Pulse: 61   Temp: 97.4 °F (36.3 °C)   SpO2: (!) 88%   Weight: 247 lb (112 kg)     Physical Exam   Constitutional: He appears well-developed and well-nourished.   Nasal cannula present maintains continuous home O2   HENT:   Head: Normocephalic and atraumatic.   Right Ear: External ear normal.   Left Ear: External ear  "normal.   Eyes: EOM are normal. Pupils are equal, round, and reactive to light.   No nystagmus present with gazing direction patient patient did have some mild lightheadedness or dizziness complaints with both upward and downward gazing.   Cardiovascular: Normal rate, regular rhythm and normal heart sounds.    Pulmonary/Chest: Effort normal and breath sounds normal.   Slight decreased breath sounds but no rales or wheezes appreciated   Abdominal: Soft. Bowel sounds are normal.   Neurological: He is alert.   Negative Romberg   Skin: Skin is warm and dry.   Nursing note and vitals reviewed.      Lab Results   Component Value Date    INR 1.18 (H) 05/12/2017    INR 1.22 (H) 02/03/2017    INR 1.32 (H) 02/01/2017         ECG 12 Lead  Date/Time: 6/19/2017 2:11 PM  Performed by: AMY ROTHMAN JR  Authorized by: AMY ROTHMAN JR   Previous ECG: no previous ECG available  Rhythm: sinus rhythm  Conduction: right bundle branch block  ST Depression: V2 and V3  T elevation: V1, V2 and V3  T depression: III  Clinical impression: abnormal ECG  Comments: EKG is abnormal showing pronounced right bundle branch block with marked RAD.  I do not have an actual type description of such.  Other report just indicates right bundle branch block is already existing.  Abnormal EKG \"(without correct visual comparison          Chest x-ray PA and lateral obtained.  We do have a comparison from 8/12/16.  Today's chest x-ray suggests increased markings in early CHF seen on PA view.  This was not present on old comparison.  No other active parenchymal infiltrates are noted.  No other bony or soft tissue abnormalities are noted patient does have cardiomegaly obvious on both today's and the comparison EKG.  Assessment/Plan   1.  Chest pain status post 2 cardiac stenting's known CAD exertional nature and such with hypoxia plan go to ER now we did discuss this with his cardiologist office has not been able to get him in on very short notice " a.    2.  Lightheadedness recurrent await lab and let the cardiac issues gets sorted out prior to testing with him having dizziness with upper gaze to I do think there's likely BPV or other component to him    3.  Acute diastolic heart failure this to be superimposed on chronic.  I did have informed him that this was not addressed with ER let me know and we will probably manipulate diuretics for him.    4.  Exercise-induced hypoxemia rather striking mass history with a walking will desaturate down to 77% we will see how much cardiac issues improve this.  He does have pulmonary fibrosis also which is a component.    5.  Abnormal EKG with no direct comparison just descriptive version of same again cardiac evaluation in the ER.    Much of this encounter note is an electronic transcription/translation of spoken language to printed text.  The electronic translation of spoken language may permit erroneous, or at times, nonsensical words or phrases to be inadvertently transcribed.  Although I have reviewed the note for such errors, some may still exist.

## 2017-06-20 PROBLEM — N18.2 CKD (CHRONIC KIDNEY DISEASE) STAGE 2, GFR 60-89 ML/MIN: Status: ACTIVE | Noted: 2017-01-01

## 2017-07-10 NOTE — PROGRESS NOTES
Procedure   [facility]  Cardiology Progress Note    Patient Identification:  Name:Kenan Garrison  Age:72 y.o.  Sex: male  :  1945  MRN: [id number]           7/10/2017    Subjective:    Chief Complaint   Patient presents with   • Atrial Fibrillation       HPI     He has history of paraxysmal atrial tachycardia , he had ablation twice , the last one done by Erich and since then he is doing ok. He was recently in the hospital with some chest pain. The cardiolyte is normal .He was seen by EP Dr. Dewitt before and was placed on amiodarone due to recurrent arrhythmias.  Probably he developed some Amiodarone induced lung injury and it was discontinued immediately. But he has chronic dyspnea even before amiodarone therapy which he is not on for longterm and I can not completely exclude underlying denovo interstitial lung disease. He was started on Revatio after the recent.  Subsequently he underwent repeat ablation by Lucinda.  He is on home oxygen and still complains of class 3 dyespnea. He is off prednisone.      Clinically he has been stable with no angina. His ECG shows RBBB/RAD. In sinus now. No angina. He does have mild right heart dilatation. His left side looks ok. No syncope.     He desaturates on exertion and recently his lasix dose has been reduced. Now he feels even more edematous.     Recent cardiac cath revealed a heavily calcified complex stenosis in the proximal LAD that was stented with a good result. Since then no angina. His RCA has moderate plaque in the mid vessel , not flow limiting.    Review of Systems   Constitution: Positive for malaise/fatigue. Negative for chills and fever.   HENT: Positive for headaches.    Cardiovascular: Positive for leg swelling and palpitations. Negative for chest pain and irregular heartbeat.   Respiratory: Positive for shortness of breath and snoring (occ).    Musculoskeletal: Positive for arthritis and joint pain.   Gastrointestinal: Negative  for abdominal pain, nausea and vomiting.   Neurological: Positive for light-headedness and numbness. Negative for dizziness.       The following portions of the patient's history were reviewed and updated as appropriate: allergies, current medications, past family history, past medical history, past social history,and problem list.    Past Medical History:   Diagnosis Date   • Abnormal electrocardiogram    • Atrial fibrillation    • BPH (benign prostatic hyperplasia)    • Bundle branch block, left hemiblock    • Cancer     bladder   • Cardiovascular risk factor     Diabetes.  Positive hypertension.  Nonsmoker.  Positive family history of early CAD.  Positive hyperlipidemia   • Chest pain    • Chest pain    • CHF (congestive heart failure)     per patient record   • Coronary artery disease    • Dyslipidemia    • Fatigue    • Generalized osteoarthritis of unspecified site    • H/O cardiac radiofrequency ablation    • Hepatitis    • Hyperlipidemia    • Hypertension    • Jaundice     age 18   • Mitral valve insufficiency    • Obesity    • Pneumonia    • Right bundle branch block    • Sleep apnea    • Urinary stone        Past Surgical History:   Procedure Laterality Date   • BLADDER SURGERY      cancer  approx 3yrs ago   • CARDIAC ABLATION     • CARDIAC ELECTROPHYSIOLOGY PROCEDURE N/A 10/26/2016    Procedure: Ablation atrial fibrillation;  Surgeon: Primo Dewitt MD;  Location: [facility] INVASIVE LOCATION;  Service:    • CARDIAC ELECTROPHYSIOLOGY PROCEDURE N/A 2/2/2017    Procedure: Ablation atrial flutter;  Surgeon: Lee Jung MD;  Location: [facility] INVASIVE LOCATION;  Service:    • CARDIAC ELECTROPHYSIOLOGY PROCEDURE N/A 2/2/2017    Procedure: Ablation atrial fibrillation;  Surgeon: Lee Jung MD;  Location: [facility] INVASIVE LOCATION;  Service:    • COLONOSCOPY     • CORONARY ANGIOPLASTY WITH STENT PLACEMENT     • CORONARY ANGIOPLASTY WITH STENT PLACEMENT     • ENDOSCOPY N/A 7/26/2016    Procedure:  ESOPHAGOGASTRODUODENOSCOPY with esophageal dilation #48 and #52 Mckeon dilators;  Surgeon: Sebastian Ho MD;  Location: [facility] ENDOSCOPY;  Service:    • KNEE ARTHROSCOPY Right    • THROMBOENDARTERECTOMY      CAROTID   • TONSILLECTOMY     • TONSILLECTOMY AND ADENOIDECTOMY         Social History     Social History   • Marital status:      Spouse name: N/A   • Number of children: N/A   • Years of education: N/A     Occupational History   • Not on file.     Social History Main Topics   • Smoking status: Former Smoker     Packs/day: 3.50     Years: 22.00     Types: Cigarettes     Quit date: 1987   • Smokeless tobacco: Never Used   • Alcohol use No   • Drug use: No   • Sexual activity: Defer     Other Topics Concern   • Not on file     Social History Narrative       Family History   Problem Relation Age of Onset   • Coronary artery disease Other    • Heart disease Mother        Scheduled Meds:    Current Outpatient Prescriptions:   •  aspirin 81 MG chewable tablet, Chew 2 tablets Daily., Disp: 60 tablet, Rfl: 1  •  ciclopirox (LOPROX) 0.77 % suspension, , Disp: , Rfl:   •  ciclopirox (LOPROX) 1 % shampoo, , Disp: , Rfl:   •  clopidogrel (PLAVIX) 75 MG tablet, Take 1 tablet by mouth Daily., Disp: 30 tablet, Rfl: 1  •  furosemide (LASIX) 20 MG tablet, Take 1 tablet by mouth Daily. (Patient taking differently: Take 40 mg by mouth Daily.), Disp: 30 tablet, Rfl: 1  •  metoprolol succinate XL (TOPROL-XL) 50 MG 24 hr tablet, Take 1 tablet by mouth Daily., Disp: 120 tablet, Rfl: 2  •  nitroglycerin (NITROSTAT) 0.4 MG SL tablet, 1 under the tongue as needed for angina, may repeat q5mins for up three doses, Disp: 100 tablet, Rfl: 1  •  omeprazole (priLOSEC) 40 MG capsule, Take 1 capsule by mouth Daily., Disp: 90 capsule, Rfl: 1  •  potassium chloride (K-DUR,KLOR-CON) 20 MEQ CR tablet, Take 20 mEq by mouth Daily., Disp: , Rfl:   •  pravastatin (PRAVACHOL) 40 MG tablet, Take 1 tablet by mouth Daily., Disp: 30 tablet, Rfl:  1  •  sildenafil (REVATIO) 20 MG tablet, Take 1 tablet by mouth Every 8 (Eight) Hours., Disp: 90 tablet, Rfl: 1  •  tamsulosin (FLOMAX) 0.4 MG capsule 24 hr capsule, Take 1 capsule by mouth Every Night., Disp: , Rfl:   •  traMADol (ULTRAM) 50 MG tablet, Take 50 mg by mouth Every 6 (Six) Hours As Needed for moderate pain (4-6)., Disp: , Rfl:   •  warfarin (COUMADIN) 5 MG tablet, Take 1 tablet by mouth Daily., Disp: 30 tablet, Rfl: 1    Objective:  /72  Wt 246 lb (112 kg)  BMI 36.33 kg/m2     Physical Exam  Physical Exam:    General: No acute distress.on home oxygen    Skin: Warm and dry, no diaphoresis noted   HEENT: No ptosis; external ear and nose normal; oral mucosa moist   Neck: Supple; no carotid bruits;mild JVD, Trachea mid line   Heart: S1S2 regular rate and rhythm; soft systolic murmurs; no gallop or rub appreciated, apex not displaced   Chest: Respirations regular, unlabored at rest, bilateral breath sounds have decreased air entry at bases; no  wheezes auscultated.     Abdomen: Soft, non-tender, -distended, positive bowel sounds  No hepatosplenomegaly   Extremities: Bilateral lower extremities have  pre-tibial pitting edema; Radials are palpable   Neurological: Alert and oriented x 3; no new motor deficits,           ECG 12 Lead  Date/Time: 7/10/2017 11:43 AM  Performed by: LAVONNE BROOKS  Authorized by: LAVONNE BROOKS   Rhythm: sinus rhythm  Conduction: right bundle branch block  QRS axis: right             Comparison to previous ECG:  Similar to previous ecg     Assessment:  Problem List Items Addressed This Visit        Cardiovascular and Mediastinum    Coronary artery disease - Primary    Relevant Medications    metoprolol succinate XL (TOPROL-XL) 50 MG 24 hr tablet    HLD (hyperlipidemia)    BP (high blood pressure)    Relevant Medications    metoprolol succinate XL (TOPROL-XL) 50 MG 24 hr tablet    Tachycardia, paroxysmal    Relevant Medications    metoprolol succinate XL  (TOPROL-XL) 50 MG 24 hr tablet    Chronic congestive heart failure    Relevant Medications    metoprolol succinate XL (TOPROL-XL) 50 MG 24 hr tablet       Respiratory    Interstitial lung disease          Plan:    At this time I want to place him demadex but he is worried that his insurance may not cover it. He is on triple therapy with aspirin/plavix and coumadin. i will increase the dose of the demadex. Since I am leaving [facility], I asked him whom he would like to follow up with and he said he will follow up with Dr. Suarez, I will arrange a follow up with him. His main issue now is primarily lung with some right heart failure. He tends to become hypotensive and to monitor BMP periodically .    He could not tolerate other statins. He can afford zettia. I will try whether his insurance will cover Repatha. To increase the dose of the lasix. Asked him to have BMP checked with his family MD in a week after rasing the dose of Lasix    I not only counseled the patient today on the risk factor modification of significant factors noted in the assessment and plan, and I also recommended that the patient reduce salt and saturated animal fat intake in diet, about the advantages of plant based diet, as well as to perform scheduled exercise on a regular basis.    07/10/2017  Ever Gonzalez MD, FACC

## 2017-07-11 NOTE — PROGRESS NOTES
Subjective   Kenan Garrison is a 72 y.o. male.   Patient recently discharged from hospital with pulmonary fibrosis, pulmonary hypertension has coronary disease  History of Present Illness   During hospital stay did receive a stent patient cardiac was not part of his shortness of breath type complaints it seems be both his pulmonary fibrosis and pulmonary hypertensionin production did take Lasix other day.  Has stage II chronic renal disease which we will recheck is not on statin now sees been intolerant to Pravachol Crestor and Lipitor with muscle aches on these.  Abdomen give patient some samples Livalo he is to not take those yet, he is  to start CoQ10 100 milligrams twice a day.  Minimal swelling which is improved with administration Rx for Lasix will day    Review of Systems   HENT: Negative for ear pain and sore throat.    Respiratory: Positive for shortness of breath.    Cardiovascular: Negative for chest pain and leg swelling.   Gastrointestinal: Negative for abdominal pain and vomiting.   Musculoskeletal: Negative for neck pain.   Skin: Negative for rash.   Neurological: Positive for headaches.   All other systems reviewed and are negative.      Objective   Vitals:    07/11/17 0803   BP: 120/62   Pulse: 78   Temp: 97.9 °F (36.6 °C)   SpO2: (!) 87%   Weight: 242 lb 12.8 oz (110 kg)     Physical Exam   Constitutional: He appears well-developed and well-nourished.   Chronic nasal O2 generally at 4 L   HENT:   Head: Normocephalic and atraumatic.   Cardiovascular: Normal rate, regular rhythm and normal heart sounds.    Pulmonary/Chest: Effort normal.   Patient has slightly decreased breath sounds right lower lung field posteriorly on the right versus left.  No wheezing heard patient has known pulmonary fibrosis   Abdominal: Soft. Bowel sounds are normal.   Musculoskeletal:   Patient with 1+ edema distal lower extremities.  Cords no Homans is a very superficial area of the right posterior calf perhaps localized  degree of venous thrombosis versus varicose vein   Neurological: He is alert.   Unremarkable gait within normal limits   Nursing note and vitals reviewed.      Lab Results   Component Value Date    INR 2.08 (H) 06/28/2017    INR 1.47 (H) 06/27/2017    INR 1.22 (H) 06/26/2017       Procedures  Chest x-ray PA PA only obtained.  No active parenchymal infiltrates.  If comparison from 6/19/1790 significant changes noted patient does have cardiomegaly slight increase in markings lower lung fields which may reflect his known pulmonary fibrosis.  Assessment/Plan     1.  Pulmonary fibrosis plan continue present treatment    2.  Diastolic CHF chronic stable by history continue present treatment follow-up as needed    3.  Hyperlipidemia plan await lab patient has been intolerant 3 statins has known coronary disease we will consider a trial of Livalo samples of 2 mg daily given patient we will wait for pending lab if satisfactory we'll consider giving Livalo as a half tablet of 2 mg i.e. 1 mg daily with CoQ10 milligrams twice a day beginning 3 days prior and continue with this.  No final decision remain on this.  We will await pending lab on Thursday    4.  Chronic renal disease stage II on diuretics plan await lab  Follow-up in 3 months and as needed contingent on pending lab  Patient is followed by other i.e. cardiology for his Coumadin adjustment    Much of this encounter note is an electronic transcription/translation of spoken language to printed text.  The electronic translation of spoken language may permit erroneous, or at times, nonsensical words or phrases to be inadvertently transcribed.  Although I have reviewed the note for such errors, some may still exist.

## 2017-08-15 PROBLEM — Z51.81 ANTICOAGULATION GOAL OF INR 2.0 TO 2.5: Status: ACTIVE | Noted: 2017-01-01

## 2017-08-15 PROBLEM — Z79.01 ANTICOAGULATION GOAL OF INR 2.0 TO 2.5: Status: ACTIVE | Noted: 2017-01-01

## 2017-08-15 NOTE — PROGRESS NOTES
Subjective   Kenan Garrison is a 72 y.o. male.   PAF, anticoagulation with Coumadin  History of Present Illness   bp varies  toprol seems to bring blood pressure low for 3-4 hours.  In her improves.  Overall has dropped as low before seems be doing fairly well is generally helps with breathing.  Overall patient is doing fair at this point in time I saw level was good at rest desaturation needs active as continuous nasal O2.  History of CHF diastolic in nature and pulmonary fibrosis.  Hypertension also    Review of Systems   All other systems reviewed and are negative.      Objective   Vitals:    08/15/17 1016   BP: (!) 88/46   Pulse: 74   Temp: 98.2 °F (36.8 °C)   SpO2: 90%   Weight: 244 lb 12.8 oz (111 kg)     Physical Exam   Constitutional: He appears well-developed and well-nourished.   HENT:   Head: Normocephalic and atraumatic.   Cardiovascular: Normal rate, regular rhythm and normal heart sounds.    Pulmonary/Chest: Effort normal.   Slightly decreased breath sounds but no rales or wheezes   Neurological: He is alert.   Skin: Skin is warm and dry.   Nursing note and vitals reviewed.      Lab Results   Component Value Date    INR 2.08 (H) 06/28/2017    INR 1.47 (H) 06/27/2017    INR 1.22 (H) 06/26/2017       Procedures    Assessment/Plan 1.  PAF    2.  Anticoagulation with Coumadin target range 2.0-2.5 recommendations of his last cardiologist.  Has switched to Dr. Suarez.    Patient's continue 2.5 mg daily we'll get repeat INR in 2 weeks time.  Apparently does very somewhat.    3.  Hypertension variable continue monitor blood pressure is on this medicine in part for heart failure support also.  Continue present meds as his monitor blood pressure, at home    Much of this encounter note is an electronic transcription/translation of spoken language to printed text.  The electronic translation of spoken language may permit erroneous, or at times, nonsensical words or phrases to be inadvertently transcribed.   Although I have reviewed the note for such errors, some may still exist.    2.5mg qd has 5mg tab  inr 2wk

## 2017-08-16 NOTE — OUTREACH NOTE
[facility] contacted patient to discuss HRCM program.  Patient requests call back. Will continue follow up.

## 2017-08-18 NOTE — OUTREACH NOTE
"Current Barriers & Concerns:  Barriers: Other (See Comment) (SOB when walking long distance or \"stands too long\")  The main concerns and/or symptoms the patient would like to address are: Patient states he would like to have better understanding reasons of breathing difficulties and improve breathing. Pulmonary rehab was mentioned by patient.     Education/instruction provided by Care Coordinator:  Research and provide information regarding benefits of  daily weights; monitoring blood pressure and O2 SAT; use of oxygen and diet.     Follow Up Outreach Due: 9-21-17.  "

## 2017-08-28 PROBLEM — I27.20 PULMONARY HYPERTENSION (HCC): Status: ACTIVE | Noted: 2017-01-01

## 2017-09-04 NOTE — ED TRIAGE NOTES
Pt reports nose bleed from left nare started 1700 after blowing nose and can not get it to stop bleeding. Pt on coumadin and plavix.

## 2017-09-04 NOTE — ED PROVIDER NOTES
EMERGENCY DEPARTMENT ENCOUNTER    CHIEF COMPLAINT  Chief Complaint: nosebleed  History given by: patient  History limited by: nothing  Room Number: 25/25  PMD: Dileep Mccall MD      HPI:  Pt is a 72 y.o. male who presents complaining of a nosebleed that onset around 1700 today after blowing his nose. Pt denies recent congestion. Pt is normally on 4.5L O2 at home.     Duration:  8 hours  Onset: gradual  Timing: constant  Location: nose  Quality: nosebleed  Intensity/Severity: moderate  Progression: improved  Associated Symptoms: none  Aggravating Factors: none specified  Alleviating Factors: none specified  Previous Episodes: pt has had no previous episodes  Treatment before arrival: None    PAST MEDICAL HISTORY  Active Ambulatory Problems     Diagnosis Date Noted   • Bundle branch block, right 02/01/2016   • Coronary artery disease 02/01/2016   • Awareness of heartbeats 02/01/2016   • HLD (hyperlipidemia) 02/01/2016   • BP (high blood pressure) 02/01/2016   • Loud breathing during sleep 02/01/2016   • Tachycardia, paroxysmal 02/01/2016   • Greater trochanteric bursitis of right hip 03/16/2016   • Chest discomfort 03/17/2016   • Ankle edema 03/17/2016   • Dysphagia 04/21/2016   • Chronic congestive heart failure 04/21/2016   • Chest pain at rest 06/30/2016   • Precordial chest pain 09/14/2016   • Paroxysmal a-fib 10/26/2016   • Community acquired bacterial pneumonia 11/06/2016   • Acute on chronic respiratory failure with hypoxia 11/07/2016   • Interstitial lung disease 11/10/2016   • Acute and chronic respiratory failure with hypoxia 11/10/2016   • Diastolic CHF, acute on chronic 11/10/2016   • AF (atrial fibrillation) 12/13/2016   • Precordial pain 01/17/2017   • Atrial flutter with rapid ventricular response 01/20/2017   • A-fib 01/31/2017   • Atrial fibrillation 01/31/2017   • Atypical atrial flutter 02/01/2017   • Esophageal spasm 02/13/2017   • Chest pain 06/19/2017   • CKD (chronic kidney disease) stage  2, GFR 60-89 ml/min 06/20/2017   • Anticoagulation goal of INR 2.0 to 2.5 08/15/2017   • Pulmonary hypertension 08/28/2017     Resolved Ambulatory Problems     Diagnosis Date Noted   • No Resolved Ambulatory Problems     Past Medical History:   Diagnosis Date   • Abnormal electrocardiogram    • Atrial fibrillation    • BPH (benign prostatic hyperplasia)    • Bundle branch block, left hemiblock    • Cancer    • Cardiovascular risk factor    • Chest pain    • Chest pain    • CHF (congestive heart failure)    • Coronary artery disease    • Dyslipidemia    • Fatigue    • Generalized osteoarthritis of unspecified site    • H/O cardiac radiofrequency ablation    • Hepatitis    • Hyperlipidemia    • Hypertension    • Jaundice    • Mitral valve insufficiency    • Obesity    • Pneumonia    • Right bundle branch block    • Sleep apnea    • Urinary stone        PAST SURGICAL HISTORY  Past Surgical History:   Procedure Laterality Date   • BLADDER SURGERY      cancer  approx 3yrs ago   • CARDIAC ABLATION     • CARDIAC CATHETERIZATION N/A 6/22/2017    Procedure: Right and Left Heart Cath;  Surgeon: Herman Gonzalez MD;  Location: [facility] CATH INVASIVE LOCATION;  Service:    • CARDIAC CATHETERIZATION N/A 6/22/2017    Procedure: Stent TANNER coronary;  Surgeon: Herman Gonzalez MD;  Location: [facility] CATH INVASIVE LOCATION;  Service:    • CARDIAC CATHETERIZATION Left 8/25/2017    Procedure: Cardiac Catheterization/Vascular Study R/L/C w SAT RUN;  Surgeon: Og Bautista MD;  Location: [facility] CATH INVASIVE LOCATION;  Service:    • CARDIAC CATHETERIZATION N/A 8/25/2017    Procedure: Left Heart Cath;  Surgeon: Og Bautista MD;  Location: [facility] CATH INVASIVE LOCATION;  Service:    • CARDIAC CATHETERIZATION N/A 8/25/2017    Procedure: Left ventriculography;  Surgeon: Og Bautista MD;  Location: [facility] CATH INVASIVE LOCATION;  Service:    • CARDIAC CATHETERIZATION N/A 8/25/2017    Procedure: Coronary angiography;  Surgeon: Og Bautista MD;   Location: [facility] CATH INVASIVE LOCATION;  Service:    • CARDIAC CATHETERIZATION N/A 8/25/2017    Procedure: Right Heart Cath;  Surgeon: Og Bautista MD;  Location: [facility] CATH INVASIVE LOCATION;  Service:    • CARDIAC ELECTROPHYSIOLOGY PROCEDURE N/A 10/26/2016    Procedure: Ablation atrial fibrillation;  Surgeon: Primo Dewitt MD;  Location: [facility] CATH INVASIVE LOCATION;  Service:    • CARDIAC ELECTROPHYSIOLOGY PROCEDURE N/A 2/2/2017    Procedure: Ablation atrial flutter;  Surgeon: Lee Jung MD;  Location: [facility] CATH INVASIVE LOCATION;  Service:    • CARDIAC ELECTROPHYSIOLOGY PROCEDURE N/A 2/2/2017    Procedure: Ablation atrial fibrillation;  Surgeon: Lee Jung MD;  Location: [facility] CATH INVASIVE LOCATION;  Service:    • COLONOSCOPY     • CORONARY ANGIOPLASTY WITH STENT PLACEMENT     • CORONARY ANGIOPLASTY WITH STENT PLACEMENT     • ENDOSCOPY N/A 7/26/2016    Procedure: ESOPHAGOGASTRODUODENOSCOPY with esophageal dilation #48 and #52 Mckeon dilators;  Surgeon: Sebastian Ho MD;  Location: [facility] ENDOSCOPY;  Service:    • KNEE ARTHROSCOPY Right    • THROMBOENDARTERECTOMY      CAROTID   • TONSILLECTOMY     • TONSILLECTOMY AND ADENOIDECTOMY         FAMILY HISTORY  Family History   Problem Relation Age of Onset   • Coronary artery disease Other    • Heart disease Mother        SOCIAL HISTORY  Social History     Social History   • Marital status:      Spouse name: N/A   • Number of children: N/A   • Years of education: N/A     Occupational History   • Not on file.     Social History Main Topics   • Smoking status: Former Smoker     Packs/day: 3.50     Years: 22.00     Types: Cigarettes     Quit date: 1987   • Smokeless tobacco: Never Used   • Alcohol use No   • Drug use: No   • Sexual activity: Defer     Other Topics Concern   • Not on file     Social History Narrative       ALLERGIES  Albuterol; Amiodarone; Biaxin [clarithromycin]; Lipitor [atorvastatin]; Pravastatin; and Allopurinol    REVIEW OF  SYSTEMS  Review of Systems   Constitutional: Negative for activity change, appetite change and fever.   HENT: Positive for nosebleeds. Negative for congestion and sore throat.    Eyes: Negative.    Respiratory: Negative for cough and shortness of breath.    Cardiovascular: Negative for chest pain and leg swelling.   Gastrointestinal: Negative for abdominal pain, diarrhea and vomiting.   Endocrine: Negative.    Genitourinary: Negative for decreased urine volume and dysuria.   Musculoskeletal: Negative for neck pain.   Skin: Negative for rash and wound.   Allergic/Immunologic: Negative.    Neurological: Negative for weakness, numbness and headaches.   Hematological: Negative.    Psychiatric/Behavioral: Negative.    All other systems reviewed and are negative.      PHYSICAL EXAM  ED Triage Vitals   Temp Heart Rate Resp BP SpO2   09/03/17 2242 09/03/17 2242 09/03/17 2242 09/03/17 2242 09/03/17 2242   98.3 °F (36.8 °C) 69 18 144/76 81 %      Temp src Heart Rate Source Patient Position BP Location FiO2 (%)   -- 09/04/17 0013 -- -- --    Monitor          Physical Exam   Constitutional: No distress.   HENT:   Head: Normocephalic and atraumatic.   Dry nasal mucosa. Inflammation of L nare medial wall with no clotting or active bleeding.   Eyes: EOM are normal.   Neck: Normal range of motion.   Cardiovascular: Normal heart sounds.    Pulmonary/Chest: No respiratory distress.   Nasal cannula for O2   Abdominal: There is no tenderness.   Musculoskeletal: He exhibits no edema.   Neurological: He is alert.   Skin: Skin is warm and dry.   Nursing note and vitals reviewed.      LAB RESULTS  Lab Results (last 24 hours)     Procedure Component Value Units Date/Time    Protime-INR [[id number]]  (Abnormal) Collected:  09/03/17 2314    Specimen:  Blood Updated:  09/03/17 2339     Protime 25.0 (H) Seconds      INR 2.36 (H)          I ordered the above labs and reviewed the results      PROCEDURES  Procedures      PROGRESS AND CONSULTS  ED  Course   2253  Ordered INR for further evaluation    2307  Ordered blood work for further evaluation    0100  Discussed plan to give ocean nasal spray and discharge home. Pt agrees to plan and all questions are addressed.     0105  Ordered Ocean nasal spray and mian-synephrine for nosebleed.         MEDICAL DECISION MAKING  Results were reviewed/discussed with the patient and they were also made aware of online access. Pt also made aware that some labs, such as cultures, will not be resulted during ER visit and follow up with PMD is necessary.     MDM  Number of Diagnoses or Management Options     Amount and/or Complexity of Data Reviewed  Clinical lab tests: ordered and reviewed    Patient Progress  Patient progress: stable         DIAGNOSIS  Final diagnoses:   Acute anterior epistaxis       DISPOSITION  DISCHARGE    Patient discharged in stable condition.    Reviewed implications of results, diagnosis, meds, responsibility to follow up, warning signs and symptoms of possible worsening, potential complications and reasons to return to ER.    Patient/Family voiced understanding of above instructions.    Discussed plan for discharge, as there is no emergent indication for admission.  Pt/family is agreeable and understands need for follow up and repeat testing.  Pt is aware that discharge does not mean that nothing is wrong but it indicates no emergency is present that requires admission and they must continue care with follow-up as given below or physician of their choice.     FOLLOW-UP  Dileep Mccall Jr., MD  [address] [id number] [phone]               Medication List      New Prescriptions          sodium chloride 0.65 % nasal spray   Commonly known as:  OCEAN NASAL SPRAY   2 sprays into each nostril 3 (Three) Times a Day As Needed for Congestion.           Changed          metoprolol succinate XL 50 MG 24 hr tablet   Commonly known as:  TOPROL-XL   Take 1 tablet by mouth Daily.   What  changed:  when to take this       pitavastatin calcium 2 MG tablet tablet   Commonly known as:  LIVALO   Take 1/2 tablet daily   What changed:    - how much to take  - when to take this  - additional instructions       warfarin 5 MG tablet   Commonly known as:  COUMADIN   Take 1 tablet by mouth Daily.   What changed:    - how much to take  - when to take this               Latest Documented Vital Signs:  As of 1:08 AM  BP- 122/71 HR- 58 Temp- 98.3 °F (36.8 °C) O2 sat- 96%    --  Documentation assistance provided by kacie Soares for Dr. Stone.  Information recorded by the kacie was done at my direction and has been verified and validated by me.     Riana Soares  09/04/17 0108       Gómez Stone MD  09/04/17 0124

## 2017-09-05 NOTE — TELEPHONE ENCOUNTER
ED f/u phone call: states he has had no further nosebleeds, and he is using saline nasal spray as prescribed. No questions/concerns

## 2017-09-07 PROBLEM — N18.2 CKD (CHRONIC KIDNEY DISEASE) STAGE 2, GFR 60-89 ML/MIN: Status: RESOLVED | Noted: 2017-01-01 | Resolved: 2017-01-01

## 2017-09-07 NOTE — PROGRESS NOTES
Subjective   eKnan Garrison is a 72 y.o. male.   Patient doing fair compliant with medication has PAF as well as anticoagulation with Coumadin current dose is 2.5 mg day which by today's INR seems be working well.  We will have patient follow-up in one month's time.   History of Present Illness   PAF, anticoagulation with Coumadin.  Patient also has pulmonary fibrosis apparently did not respond to the Viagra.  Currently is trying to get up prior authorization from his pulmonologist for a newer agent out there.  description per patient is that this is a derivative of Cialis.  Briefly discuss a second opinion from another specialist I told him to discuss this with his pulmonologist, but is a reasonable request.  The pressures satisfactory on current medications    Review of Systems   All other systems reviewed and are negative.      Objective   Vitals:    09/07/17 0941   BP: 110/62   Pulse: 62   Temp: 98.2 °F (36.8 °C)   SpO2: (!) 76%   Weight: 240 lb (109 kg)     Physical Exam   Constitutional: He appears well-developed and well-nourished.   HENT:   Head: Normocephalic and atraumatic.   Cardiovascular: Normal rate, regular rhythm and normal heart sounds.    Pulmonary/Chest: Effort normal.   Decreased breath sounds but no wheezes or rales   Nursing note and vitals reviewed.      Lab Results   Component Value Date    INR 2.60 (A) 09/07/2017    INR 2.36 (H) 09/03/2017    INR 1.87 (H) 08/28/2017       Procedures    Assessment/Plan   1.  PAF    2.  Anticoagulation with Coumadin target range 2.0-3.0 plan continue present Coumadin dose at 2.5 mg daily with INR in 1 month    3.  Hypertension controlled plan continue same, incidentally heart rate also controlled at present    Much of this encounter note is an electronic transcription/translation of spoken language to printed text.  The electronic translation of spoken language may permit erroneous, or at times, nonsensical words or phrases to be inadvertently transcribed.   Although I have reviewed the note for such errors, some may still exist.

## 2017-09-18 PROBLEM — G47.33 OSA (OBSTRUCTIVE SLEEP APNEA): Status: ACTIVE | Noted: 2017-01-01

## 2017-09-18 NOTE — PROGRESS NOTES
Date of Office Visit: 2017  Encounter Provider: Nathaniel Suarez MD  Place of Service: [facility] CARDIOLOGY  Patient Name: Kenan Garrison  :1945  [id number]    Chief Complaint   Patient presents with   • Chest Pain   • Atrial Fibrillation   :     HPI: Kenan Garrison is a 72 y.o. male  history hair as a new patient for me but it previously seen another cardiologist in the [facility] system.  He has a complex past medical history including a history of paroxysmal A. fib he had seen Dr. lee in the past and underwent a A. fib ablation it recurred he was put on amiodarone he says he received amiodarone for about a week or 2 and after that he developed pulmonary fibrosis.  He underwent a second ablation with Dr. Lee Jung and is done pretty well since then but he does say that he has A. fib intermittently does he's been anticoagulated with warfarin.  He had a stent implanted in 2016 believe to his circumflex he has chronic right bundle branch block.  He just was hospitalized with respiratory failure he underwent a left and right heart catheter which revealed normal LV function moderate diffuse coronary disease between 40-60% his stent in his circumflex was had some 30% stenosis but he had severe pulmonary hypertension PA pressures were 75/26 with a wedge of 16    He's not had any chest pain no bleeding difficulty other than occasional epistaxis no PND orthopnea a little bit of edema and no change in his weight and I significant kidney disease    Past Medical History:   Diagnosis Date   • Abnormal electrocardiogram    • Atrial fibrillation    • BPH (benign prostatic hyperplasia)    • Bundle branch block, left hemiblock    • Cancer     bladder   • Cardiovascular risk factor     Diabetes.  Positive hypertension.  Nonsmoker.  Positive family history of early CAD.  Positive hyperlipidemia   • Chest pain    • CHF (congestive heart failure)     per patient record   • CKD  (chronic kidney disease) stage 2, GFR 60-89 ml/min    • COPD (chronic obstructive pulmonary disease)    • Coronary artery disease    • Dyslipidemia    • Fatigue    • Generalized osteoarthritis of unspecified site    • H/O cardiac radiofrequency ablation    • Hepatitis    • Hyperlipidemia    • Hypertension    • Jaundice     age 18   • Mitral valve insufficiency    • Obesity    • Pneumonia    • Pulmonary fibrosis    • Respiratory failure    • Right bundle branch block    • Sleep apnea    • SOB (shortness of breath)    • Urinary stone        Past Surgical History:   Procedure Laterality Date   • BLADDER SURGERY      cancer  approx 3yrs ago   • CARDIAC ABLATION     • CARDIAC CATHETERIZATION N/A 6/22/2017    Procedure: Right and Left Heart Cath;  Surgeon: Herman Gonzalez MD;  Location: [facility] CATH INVASIVE LOCATION;  Service:    • CARDIAC CATHETERIZATION N/A 6/22/2017    Procedure: Stent TANNER coronary;  Surgeon: Herman Gonzalez MD;  Location: [facility] CATH INVASIVE LOCATION;  Service:    • CARDIAC CATHETERIZATION Left 8/25/2017    Procedure: Cardiac Catheterization/Vascular Study R/L/C w SAT RUN;  Surgeon: Og Bautista MD;  Location: [facility] CATH INVASIVE LOCATION;  Service:    • CARDIAC CATHETERIZATION N/A 8/25/2017    Procedure: Left Heart Cath;  Surgeon: Og Bautista MD;  Location: [facility] CATH INVASIVE LOCATION;  Service:    • CARDIAC CATHETERIZATION N/A 8/25/2017    Procedure: Left ventriculography;  Surgeon: Og Bautista MD;  Location: [facility] CATH INVASIVE LOCATION;  Service:    • CARDIAC CATHETERIZATION N/A 8/25/2017    Procedure: Coronary angiography;  Surgeon: Og Bautista MD;  Location: [facility] CATH INVASIVE LOCATION;  Service:    • CARDIAC CATHETERIZATION N/A 8/25/2017    Procedure: Right Heart Cath;  Surgeon: Og Bautista MD;  Location: [facility] CATH INVASIVE LOCATION;  Service:    • CARDIAC ELECTROPHYSIOLOGY PROCEDURE N/A 10/26/2016    Procedure: Ablation atrial fibrillation;  Surgeon: Primo Dewitt MD;   Location:  [facility] CATH INVASIVE LOCATION;  Service:    • CARDIAC ELECTROPHYSIOLOGY PROCEDURE N/A 2/2/2017    Procedure: Ablation atrial flutter;  Surgeon: Lee Jung MD;  Location: [facility] CATH INVASIVE LOCATION;  Service:    • CARDIAC ELECTROPHYSIOLOGY PROCEDURE N/A 2/2/2017    Procedure: Ablation atrial fibrillation;  Surgeon: Lee Jung MD;  Location: [facility] CATH INVASIVE LOCATION;  Service:    • COLONOSCOPY     • CORONARY ANGIOPLASTY WITH STENT PLACEMENT     • CORONARY ANGIOPLASTY WITH STENT PLACEMENT     • ENDOSCOPY N/A 7/26/2016    Procedure: ESOPHAGOGASTRODUODENOSCOPY with esophageal dilation #48 and #52 Mckeon dilators;  Surgeon: Sebastian Ho MD;  Location[facility] ENDOSCOPY;  Service:    • KNEE ARTHROSCOPY Right    • THROMBOENDARTERECTOMY      CAROTID   • TONSILLECTOMY     • TONSILLECTOMY AND ADENOIDECTOMY         Social History     Social History   • Marital status:      Spouse name: N/A   • Number of children: N/A   • Years of education: N/A     Occupational History   • Not on file.     Social History Main Topics   • Smoking status: Former Smoker     Packs/day: 3.50     Years: 22.00     Types: Cigarettes     Quit date: 1987   • Smokeless tobacco: Never Used   • Alcohol use No   • Drug use: No   • Sexual activity: Defer     Other Topics Concern   • Not on file     Social History Narrative       Family History   Problem Relation Age of Onset   • Coronary artery disease Other    • Heart disease Mother    • Stroke Mother    • Diabetes Mother    • Hypertension Mother    • Aneurysm Mother        Review of Systems   Constitution: Negative for decreased appetite, fever, malaise/fatigue and weight loss.   HENT: Negative for nosebleeds.    Eyes: Negative for double vision.   Cardiovascular: Negative for chest pain, claudication, cyanosis, dyspnea on exertion, irregular heartbeat, leg swelling, near-syncope, orthopnea, palpitations, paroxysmal nocturnal dyspnea and syncope.   Respiratory: Negative  for cough, hemoptysis and shortness of breath.    Hematologic/Lymphatic: Negative for bleeding problem.   Skin: Negative for rash.   Musculoskeletal: Negative for falls and myalgias.   Gastrointestinal: Negative for hematochezia, jaundice, melena, nausea and vomiting.   Genitourinary: Negative for hematuria.   Neurological: Negative for dizziness and seizures.   Psychiatric/Behavioral: Negative for altered mental status and memory loss.       Allergies   Allergen Reactions   • Albuterol    • Amiodarone    • Biaxin [Clarithromycin]    • Lipitor [Atorvastatin] Myalgia   • Pravastatin Myalgia   • Allopurinol Rash         Current Outpatient Prescriptions:   •  aspirin 81 MG chewable tablet, Chew 2 tablets Daily., Disp: 60 tablet, Rfl: 1  •  clopidogrel (PLAVIX) 75 MG tablet, Take 1 tablet by mouth Daily., Disp: 30 tablet, Rfl: 5  •  coenzyme Q10 100 MG capsule, Take 100 mg by mouth Daily., Disp: , Rfl:   •  furosemide (LASIX) 40 MG tablet, Take 1 tablet by mouth 2 (Two) Times a Day., Disp: 180 tablet, Rfl: 3  •  metoprolol succinate XL (TOPROL-XL) 50 MG 24 hr tablet, Take 50 mg by mouth 2 (Two) Times a Day., Disp: , Rfl:   •  nitroglycerin (NITROSTAT) 0.4 MG SL tablet, 1 under the tongue as needed for angina, may repeat q5mins for up three doses, Disp: 100 tablet, Rfl: 1  •  omeprazole (priLOSEC) 40 MG capsule, Take 1 capsule by mouth Daily., Disp: 90 capsule, Rfl: 1  •  pitavastatin calcium (LIVALO) 2 MG tablet tablet, Take 1/2 tablet daily (Patient taking differently: 2.5 mg Daily.), Disp: 30 tablet, Rfl: 0  •  potassium chloride (K-DUR,KLOR-CON) 20 MEQ CR tablet, Take 1 tablet by mouth Daily., Disp: 30 tablet, Rfl: 2  •  sodium chloride (OCEAN NASAL SPRAY) 0.65 % nasal spray, 2 sprays into each nostril 3 (Three) Times a Day As Needed for Congestion., Disp: 104 mL, Rfl: 0  •  tamsulosin (FLOMAX) 0.4 MG capsule 24 hr capsule, Take 2 capsules by mouth Every Night., Disp: 60 capsule, Rfl: 3  •  traMADol (ULTRAM) 50 MG  "tablet, Take 1 tablet by mouth Every 6 (Six) Hours As Needed for Moderate Pain ., Disp: 28 tablet, Rfl: 0  •  warfarin (COUMADIN) 5 MG tablet, Take 1 tablet by mouth Daily. (Patient taking differently: Take 2.5 mg by mouth.), Disp: 30 tablet, Rfl: 1     Objective:     Vitals:    09/18/17 0920   BP: 110/68   Pulse: 72   Weight: 244 lb (111 kg)   Height: 69\" (175.3 cm)     Body mass index is 36.03 kg/(m^2).    Physical Exam   Constitutional: He is oriented to person, place, and time. He appears well-developed and well-nourished.   HENT:   Head: Normocephalic.   Eyes: No scleral icterus.   Neck: No JVD present. No thyromegaly present.   Cardiovascular: Normal rate, regular rhythm and normal heart sounds.  Exam reveals no gallop and no friction rub.    No murmur heard.  Pulmonary/Chest: Effort normal. He has decreased breath sounds in the right lower field. He has no wheezes. He has no rales.   Abdominal: Soft. There is no hepatosplenomegaly. There is no tenderness.   Musculoskeletal: Normal range of motion. He exhibits no edema.   Lymphadenopathy:     He has no cervical adenopathy.   Neurological: He is alert and oriented to person, place, and time.   Skin: Skin is warm and dry. No rash noted.   Psychiatric: He has a normal mood and affect.         ECG 12 Lead  Date/Time: 9/18/2017 10:00 AM  Performed by: JOSHUA SALGUERO  Authorized by: JOSHUA SALGUERO   Comparison: compared with previous ECG   Similar to previous ECG  Rhythm: sinus rhythm  Conduction: right bundle branch block  Clinical impression: abnormal ECG             Assessment:       Diagnosis Plan   1. Bundle branch block, right     2. Paroxysmal atrial fibrillation     3. Pulmonary hypertension     4. BREANNA (obstructive sleep apnea)     5. Coronary artery disease involving native coronary artery of native heart without angina pectoris     6. Non morbid obesity due to excess calories            Plan:       The gentleman with multiple complex issues the main " dominating thing is his severe lung disease and pulmonary fibrosis and pulmonary hypertension he is followed by pulmonary and also also seen an expert at [facility].  Recent catheter was done and showed documented the pulmonary hypertension I recommended that he try a little bit of weight loss as it may help to work that is lungs and heart have to undergo.  He has moderate coronary disease he had a stent implanted over 14 months ago.  I think we can stop his Plavix today.  His blood pressures looked good.  His warfarin is actually been evidently pretty easy to control.  I'm that I have him come back and see us in 6 months I spent over 45 minutes discussing this with him and his wife today    As always, it has been a pleasure to participate in your patient's care.      Sincerely,       Nathaniel Suarez MD

## 2017-09-19 NOTE — TELEPHONE ENCOUNTER
From: Kenan Garrison  To: Nathaniel Suarez MD  Sent: 9/19/2017 3:34 AM EDT  Subject: Prescription Question    Am out of nitroglycerin ok this is what my insurance will cover nitroglycerin 0.4 mg tablet sublingl 30 tablets  Can you send this in to [facility] [address]  tks

## 2017-09-25 NOTE — TELEPHONE ENCOUNTER
----- Message from Kenan Garrison sent at 9/22/2017 10:37 AM EDT -----  Regarding: Non-Urgent Medical Question  Contact: [phone]  When should I go to the ER when I gain weight in 24 hours how much should I gain before I go am eating less gaining weight

## 2017-09-27 NOTE — TELEPHONE ENCOUNTER
Called patient back. Patient is in [facility]. Went into hosp on Sunday. He was at [facility]  and his 02 sat dropped and he passed out.

## 2017-10-02 NOTE — OUTREACH NOTE
Requests assistance obtaining his wheelchair from [employer].  Ca contacted Karen, Customer Services at [employer], she will fax to Dr. Spear's office necessasry documentation required for patient to obtain the wheelchair with  Medicare coverage.  Patient requested I call back afte his 10/6/17 PCP appointment.

## 2017-10-05 NOTE — PROGRESS NOTES
Subjective   Kenan Garrison is a 72 y.o. male.   Review for Medicare annual wellness exam also needs wheelchair wheezes.  Below  History of Present Illness   o2 sat drops w limited exercise patient has continuous home O2 due to pulmonary fibrosis, CHF and pulmonary hypertension.  Has had one fall 1 within recent associated with w syncope  Due to low o2 saturations.  Patient has nasal O2 continuously still desaturates with any significant activity.  Has bilateral knee trouble needs this for ambulating successfully for distances.  Review of Systems   All other systems reviewed and are negative.      Objective   Vitals:    10/05/17 0959   BP: 120/70   Pulse: 73   Temp: 97.5 °F (36.4 °C)   SpO2: 90%         Physical Exam   Constitutional: He appears well-developed and well-nourished.   HENT:   Head: Normocephalic and atraumatic.   Eyes: Conjunctivae are normal. Pupils are equal, round, and reactive to light.   Cardiovascular: Normal rate, regular rhythm and normal heart sounds.    Pulmonary/Chest: Effort normal.   Decreased breath sounds but no rales or wheezes noted   Neurological: He is alert.   Relatively slow gait.  Taking has his oxygen canister with him around.  stable no wobbling noted no faintness described either   Skin: Skin is dry.   No evidence of cyanosis   Nursing note and vitals reviewed.      Lab Results   Component Value Date    INR 1.50 (A) 10/05/2017    INR 2.60 (A) 09/07/2017    INR 2.36 (H) 09/03/2017       Procedures    Assessment/Plan .  1.  Medicare wellness evaluation initial    2.  Atrial fibrillation    3.  Acute and chronic respiratory failure with hypoxemia plan continue present treatment    4.  Interstitial lung disease continue present treatment    5.  Chronic CHF patient is nasal O2 for all 3 of these.    6.  Evaluation for wheelchair, manual this is secondary to cardiac and pulmonary issues with somebody is chronic O2 need for ambulation around to and from office visits and for moving  around more than short walk within the house.    Much of this encounter note is an electronic transcription/translation of spoken language to printed text.  The electronic translation of spoken language may permit erroneous, or at times, nonsensical words or phrases to be inadvertently transcribed.  Although I have reviewed the note for such errors, some may still exist.      5/5/2.5/2.5  jocelyn

## 2017-10-05 NOTE — PATIENT INSTRUCTIONS
Medicare Wellness  Personal Prevention Plan of Service     Date of Office Visit:  10/05/2017  Encounter Provider:  Dileep Mccall MD  Place of Service:  [facility]  Patient Name: Kenan Garrison  :  1945    As part of the Medicare Wellness portion of your visit today, we are providing you with this personalized preventive plan of services (PPPS). This plan is based upon recommendations of the [place] Preventive Services Task Force (USPSTF) and the Advisory Committee on Immunization Practices (ACIP).    This lists the preventive care services that should be considered, and provides dates of when you are due. Items listed as completed are up-to-date and do not require any further intervention.    Health Maintenance   Topic Date Due   • TDAP/TD VACCINES (1 - Tdap) 1966   • PNEUMOCOCCAL VACCINES (65+ LOW/MEDIUM RISK) (2 of 2 - PPSV23) 2015   • HEPATITIS C SCREENING  2016   • MEDICARE ANNUAL WELLNESS  2016   • INFLUENZA VACCINE  2017   • DIABETIC FOOT EXAM  08/15/2017   • URINE MICROALBUMIN  08/15/2017   • DIABETIC EYE EXAM  2017   • HEMOGLOBIN A1C  2017   • LIPID PANEL  2018   • COLONOSCOPY  2022   • AAA SCREEN (ONE-TIME)  Completed   • ZOSTER VACCINE  Addressed       Orders Placed This Encounter   Procedures   • POC INR     This is an external result entered through the Results Console.       No Follow-up on file.

## 2017-10-05 NOTE — PROGRESS NOTES
QUICK REFERENCE INFORMATION:  The ABCs of the Annual Wellness Visit    Subsequent Medicare Wellness Visit    HEALTH RISK ASSESSMENT    1945    Recent Hospitalizations:  Recently treated at the following:  Other: [facility].        Current Medical Providers:  Patient Care Team:  Dileep Mccall Jr., MD as PCP - General  Dileep Mccall Jr., MD as PCP - Family Medicine  Dileep Mccall Jr., MD as PCP - Claims Attributed  Edna Nava RN as Care Coordinator (Population Health)        Smoking Status:  History   Smoking Status   • Former Smoker   • Packs/day: 3.50   • Years: 22.00   • Types: Cigarettes   • Quit date: 1987   Smokeless Tobacco   • Never Used       Alcohol Consumption:  History   Alcohol Use No       Depression Screen:   PHQ-2/PHQ-9 Depression Screening 10/5/2017   Little interest or pleasure in doing things 0   Feeling down, depressed, or hopeless 0   Trouble falling or staying asleep, or sleeping too much 0   Feeling tired or having little energy 0   Poor appetite or overeating 0   Feeling bad about yourself - or that you are a failure or have let yourself or your family down 0   Trouble concentrating on things, such as reading the newspaper or watching television 0   Moving or speaking so slowly that other people could have noticed. Or the opposite - being so fidgety or restless that you have been moving around a lot more than usual 0   Thoughts that you would be better off dead, or of hurting yourself in some way 0   Total Score 0   If you checked off any problems, how difficult have these problems made it for you to do your work, take care of things at home, or get along with other people? Not difficult at all       Health Habits and Functional and Cognitive Screening:  Functional & Cognitive Status 10/5/2017   Do you have difficulty preparing food and eating? No   Do you have difficulty bathing yourself? Yes   Do you have difficulty getting dressed? No   Do you have difficulty using the toilet?  No   Do you have difficulty moving around from place to place? Yes   In the past year have you fallen or experienced a near fall? Yes   Do you need help using the phone?  No   Are you deaf or do you have serious difficulty hearing?  No   Do you need help with transportation? No   Do you need help shopping? No   Do you need help preparing meals?  Yes   Do you need help with housework?  No   Do you need help with laundry? Yes   Do you need help taking your medications? No   Do you need help managing money? No   Do you have difficulty concentrating, remembering or making decisions? No       Health Habits  Current Diet: Well Balanced Diet  Dental Exam: Up to date  Eye Exam: Up to date  Exercise (times per week): 0 times per week  Current Exercise Activities Include: None      Does the patient have evidence of cognitive impairment? No    Aspirin use counseling: Taking ASA appropriately as indicated      Recent Lab Results:  CMP:  Lab Results   Component Value Date    BUN 18 08/26/2017    CREATININE 1.12 08/26/2017    EGFRIFNONA 65 08/26/2017    EGFRIFAFRI  09/14/2016      Comment:      <15 Indicative of kidney failure.    BCR 16.1 08/26/2017     08/26/2017    K 4.0 08/26/2017    CO2 27.7 08/26/2017    CALCIUM 9.9 08/26/2017    ALBUMIN 4.60 08/22/2017    LABIL2 1.4 08/22/2017    BILITOT 0.7 08/22/2017    ALKPHOS 73 08/22/2017    AST 15 08/22/2017    ALT 12 08/22/2017     Lipid Panel:  Lab Results   Component Value Date    CHOL 189 07/11/2017    TRIG 286 (H) 07/11/2017    HDL 24 (L) 07/11/2017    VLDL 57.2 (H) 07/11/2017    LDLCALC 108 (H) 07/11/2017    LDLHDL 4.49 07/11/2017     HbA1c:  Lab Results   Component Value Date    HGBA1C 5.80 (H) 06/22/2017       Visual Acuity:  No exam data present    Age-appropriate Screening Schedule:  Refer to the list below for future screening recommendations based on patient's age, sex and/or medical conditions. Orders for these recommended tests are listed in the plan section. The  patient has been provided with a written plan.    Health Maintenance   Topic Date Due   • TDAP/TD VACCINES (1 - Tdap) 05/08/1966   • PNEUMOCOCCAL VACCINES (65+ LOW/MEDIUM RISK) (2 of 2 - PPSV23) 01/01/2015   • INFLUENZA VACCINE  08/01/2017   • DIABETIC FOOT EXAM  08/15/2017   • URINE MICROALBUMIN  08/15/2017   • DIABETIC EYE EXAM  09/01/2017   • HEMOGLOBIN A1C  12/22/2017   • LIPID PANEL  07/11/2018   • COLONOSCOPY  01/01/2022   • ZOSTER VACCINE  Addressed        Subjective   History of Present Illness    Kenan Garrison is a 72 y.o. male who presents for an Subsequent Wellness Visit.    The following portions of the patient's history were reviewed and updated as appropriate: allergies, current medications, past family history, past medical history, past social history, past surgical history and problem list.    Outpatient Medications Prior to Visit   Medication Sig Dispense Refill   • aspirin 81 MG chewable tablet Chew 2 tablets Daily. 60 tablet 1   • clopidogrel (PLAVIX) 75 MG tablet Take 1 tablet by mouth Daily. 30 tablet 5   • coenzyme Q10 100 MG capsule Take 100 mg by mouth Daily.     • furosemide (LASIX) 40 MG tablet Take 1 tablet by mouth 2 (Two) Times a Day. 180 tablet 3   • metoprolol succinate XL (TOPROL-XL) 50 MG 24 hr tablet Take 50 mg by mouth 2 (Two) Times a Day.     • nitroglycerin (NITROSTAT) 0.4 MG SL tablet 1 under the tongue as needed for angina, may repeat q5mins for up three doses 25 tablet 5   • omeprazole (priLOSEC) 40 MG capsule Take 1 capsule by mouth Daily. 90 capsule 1   • pitavastatin calcium (LIVALO) 2 MG tablet tablet Take 1/2 tablet daily (Patient taking differently: 2.5 mg Daily.) 30 tablet 0   • potassium chloride (K-DUR,KLOR-CON) 20 MEQ CR tablet Take 1 tablet by mouth Daily. 30 tablet 2   • sodium chloride (OCEAN NASAL SPRAY) 0.65 % nasal spray 2 sprays into each nostril 3 (Three) Times a Day As Needed for Congestion. 104 mL 0   • tamsulosin (FLOMAX) 0.4 MG capsule 24 hr capsule  "Take 2 capsules by mouth Every Night. 60 capsule 3   • traMADol (ULTRAM) 50 MG tablet Take 1 tablet by mouth Every 6 (Six) Hours As Needed for Moderate Pain . 28 tablet 0   • warfarin (COUMADIN) 5 MG tablet Take 1 tablet by mouth Daily. (Patient taking differently: Take 2.5 mg by mouth.) 30 tablet 1     No facility-administered medications prior to visit.        Patient Active Problem List   Diagnosis   • Bundle branch block, right   • Coronary artery disease   • Awareness of heartbeats   • HLD (hyperlipidemia)   • BP (high blood pressure)   • Loud breathing during sleep   • Tachycardia, paroxysmal   • Greater trochanteric bursitis of right hip   • Chest discomfort   • Ankle edema   • Dysphagia   • Chronic congestive heart failure   • Chest pain at rest   • Precordial chest pain   • Paroxysmal a-fib   • Community acquired bacterial pneumonia   • Acute on chronic respiratory failure with hypoxia   • Interstitial lung disease   • Acute and chronic respiratory failure with hypoxia   • Diastolic CHF, acute on chronic   • AF (atrial fibrillation)   • Precordial pain   • Atrial flutter with rapid ventricular response   • A-fib   • Atrial fibrillation   • Atypical atrial flutter   • Esophageal spasm   • Chest pain   • Anticoagulation goal of INR 2 to 3   • Pulmonary hypertension   • BREANNA (obstructive sleep apnea)       Advance Care Planning:  has NO advance directive - information provided to the patient today    Identification of Risk Factors:  Risk factors include: increased fall risk.    Review of Systems    Compared to one year ago, the patient feels his physical health is worse.  Compared to one year ago, the patient feels his mental health is the same.    Objective     Physical Exam    Vitals:    10/05/17 0959   BP: 120/70   BP Location: Left arm   Patient Position: Sitting   Cuff Size: Adult   Pulse: 73   Temp: 97.5 °F (36.4 °C)   TempSrc: Oral   SpO2: 90%  Comment: 4L   Weight: Comment: declined   Height: 69\" (175.3 " cm)   PainSc:   5   PainLoc: Ankle       There is no height or weight on file to calculate BMI.  Discussed the patient's BMI with him. The BMI is above average; BMI management plan is completed.    Assessment/Plan   Patient Self-Management and Personalized Health Advice  The patient has been provided with information about: diet, exercise and fall prevention and preventive services including:   · Advance directive, Influenza vaccine.    Visit Diagnoses:  No diagnosis found.    Orders Placed This Encounter   Procedures   • POC INR     This is an external result entered through the Results Console.       Outpatient Encounter Prescriptions as of 10/5/2017   Medication Sig Dispense Refill   • aspirin 81 MG chewable tablet Chew 2 tablets Daily. 60 tablet 1   • clopidogrel (PLAVIX) 75 MG tablet Take 1 tablet by mouth Daily. 30 tablet 5   • coenzyme Q10 100 MG capsule Take 100 mg by mouth Daily.     • furosemide (LASIX) 40 MG tablet Take 1 tablet by mouth 2 (Two) Times a Day. 180 tablet 3   • metoprolol succinate XL (TOPROL-XL) 50 MG 24 hr tablet Take 50 mg by mouth 2 (Two) Times a Day.     • nitroglycerin (NITROSTAT) 0.4 MG SL tablet 1 under the tongue as needed for angina, may repeat q5mins for up three doses 25 tablet 5   • omeprazole (priLOSEC) 40 MG capsule Take 1 capsule by mouth Daily. 90 capsule 1   • pitavastatin calcium (LIVALO) 2 MG tablet tablet Take 1/2 tablet daily (Patient taking differently: 2.5 mg Daily.) 30 tablet 0   • potassium chloride (K-DUR,KLOR-CON) 20 MEQ CR tablet Take 1 tablet by mouth Daily. 30 tablet 2   • sodium chloride (OCEAN NASAL SPRAY) 0.65 % nasal spray 2 sprays into each nostril 3 (Three) Times a Day As Needed for Congestion. 104 mL 0   • tamsulosin (FLOMAX) 0.4 MG capsule 24 hr capsule Take 2 capsules by mouth Every Night. 60 capsule 3   • traMADol (ULTRAM) 50 MG tablet Take 1 tablet by mouth Every 6 (Six) Hours As Needed for Moderate Pain . 28 tablet 0   • warfarin (COUMADIN) 5 MG  tablet Take 1 tablet by mouth Daily. (Patient taking differently: Take 2.5 mg by mouth.) 30 tablet 1     No facility-administered encounter medications on file as of 10/5/2017.        Reviewed use of high risk medication in the elderly: not applicable  Reviewed for potential of harmful drug interactions in the elderly: not applicable    Follow Up:  No Follow-up on file.     An After Visit Summary and PPPS with all of these plans were given to the patient.

## 2017-10-06 NOTE — OUTREACH NOTE
Has not received wheelchair from [employer] or contact.  CA contacted [employer] at [facility] in Customer service and stated will send information to Dr. Spear for required documentation for Medicare coverage for patient's wheelchair.  Patient aware and will continue follow-up.

## 2017-10-09 NOTE — PROGRESS NOTES
Subjective   Kenan Garrison is a 72 y.o. male.   History of atrial fibrillation, anticoagulation with Coumadin for same.  History of Present Illness   Patient is been compliant with Coumadin dosage.  Recently did increase his dosage today's dosage.  Appears Satisfactory    Review of Systems   Constitutional: Negative.    HENT: Negative.    Eyes: Negative.    Respiratory: Positive for shortness of breath.    Cardiovascular: Positive for chest pain.   Gastrointestinal: Negative.    Endocrine: Negative.    Genitourinary: Negative.    Musculoskeletal: Positive for back pain and joint swelling.   Skin: Negative.    Allergic/Immunologic: Positive for environmental allergies.   Neurological: Negative.    Hematological: Negative.    Psychiatric/Behavioral: Negative.        Objective   Vitals:    10/09/17 0937   BP: 110/78   Pulse: 62   Temp: 97.9 °F (36.6 °C)   SpO2: 91%   Weight: 240 lb (109 kg)     Physical Exam   Constitutional: He appears well-developed and well-nourished.   HENT:   Head: Normocephalic and atraumatic.   Eyes: Conjunctivae are normal. Pupils are equal, round, and reactive to light.   Cardiovascular: Normal rate, regular rhythm and normal heart sounds.    Pulmonary/Chest: Effort normal.   Neurological: He is alert.   Nursing note and vitals reviewed.      Lab Results   Component Value Date    INR 2.30 (A) 10/09/2017    INR 1.50 (A) 10/05/2017    INR 2.60 (A) 09/07/2017       Procedures    Assessment/Plan 1.  A fibrillation    2.  Anticoagulation with Coumadin long-term plan new Coumadin dose of      2.5/2.5/2.5/5  Cycle  inr 1wk    Much of this encounter note is an electronic transcription/translation of spoken language to printed text.  The electronic translation of spoken language may permit erroneous, or at times, nonsensical words or phrases to be inadvertently transcribed.  Although I have reviewed the note for such errors, some may still exist.

## 2017-10-16 NOTE — PROGRESS NOTES
Subjective   Kenan Garrison is a 72 y.o. male.   Patient with atrial fibrillation, anticoagulation with Coumadin here for follow-up with dosage adjustment.  History of Present Illness   5/2.5/2.5/2.5 milligrams as of 4 day cycle is current Coumadin dose for his atrial fibrillation.  INR today.  Satisfactory 2.0  Review of Systems   All other systems reviewed and are negative.      Objective   Vitals:    10/16/17 1031   BP: 122/70   Pulse: 68   Temp: 97.3 °F (36.3 °C)   SpO2: 94%   Weight: 240 lb (109 kg)     Physical Exam   Constitutional: He appears well-developed and well-nourished.   HENT:   Head: Normocephalic and atraumatic.   Cardiovascular: Normal rate, regular rhythm and normal heart sounds.    Pulmonary/Chest: Effort normal and breath sounds normal.   Minimally decreased breath sounds but no rales or wheezes noted   Abdominal: Soft. Bowel sounds are normal.   Skin: Skin is warm and dry.   Nursing note reviewed.      Lab Results   Component Value Date    INR 2.00 (A) 10/16/2017    INR 2.30 (A) 10/09/2017    INR 1.50 (A) 10/05/2017       Procedures    Assessment/Plan   1.  Atrial fibrillation    2.  Anticoagulation with Coumadin plan continue Coumadin at 5/2.5/2.5/2.54 day regimen with INR 2 weeks    Much of this encounter note is an electronic transcription/translation of spoken language to printed text.  The electronic translation of spoken language may permit erroneous, or at times, nonsensical words or phrases to be inadvertently transcribed.  Although I have reviewed the note for such errors, some may still exist.

## 2017-10-25 NOTE — PROGRESS NOTES
Date of Office Visit: 10/25/2017  Encounter Provider: Lee Jung MD  Place of Service: [facility] CARDIOLOGY  Patient Name: Kenan Garrison  : 1945    Subjective:     Encounter Date:10/25/2017      Patient ID: Kenan Garrison is a 72 y.o. male who has a cc of PAF and  did PVI and linear lesions then recurrence w flutter and then I did an ablation in      He also has had in circumflex in 2016.     He reports short episodes of racing daily lasting seconds to a minute and the just go away.     He is oxygen dependent because of COPD>     He has anginal chest pain when O2 sats drop and resolves with oxygen.   He desats a lot when walking.   Severe pang,   soa,   He reports an episode of fainting at [facility] for four days   No orthostasis.   No edema.   Exercise tolerance: extremely poor.     Sees [facility] doc for pulmonary fibrosis     There have been no hospital admission since the last visit.     There have been no bleeding events.       Past Medical History:   Diagnosis Date   • Abnormal electrocardiogram    • Atrial fibrillation    • BPH (benign prostatic hyperplasia)    • Bundle branch block, left hemiblock    • Cancer     bladder   • Cardiovascular risk factor     Diabetes.  Positive hypertension.  Nonsmoker.  Positive family history of early CAD.  Positive hyperlipidemia   • Chest pain    • CHF (congestive heart failure)     per patient record   • CKD (chronic kidney disease) stage 2, GFR 60-89 ml/min    • COPD (chronic obstructive pulmonary disease)    • Coronary artery disease    • Dyslipidemia    • Fatigue    • Generalized osteoarthritis of unspecified site    • H/O cardiac radiofrequency ablation    • Hepatitis    • Hyperlipidemia    • Hypertension    • Jaundice     age 18   • Mitral valve insufficiency    • Obesity    • Pneumonia    • Pulmonary fibrosis    • Respiratory failure    • Right bundle branch block    • Sleep apnea    • SOB (shortness  "of breath)    • Urinary stone        Social History     Social History   • Marital status:      Spouse name: N/A   • Number of children: N/A   • Years of education: N/A     Occupational History   • Not on file.     Social History Main Topics   • Smoking status: Former Smoker     Packs/day: 3.50     Years: 22.00     Types: Cigarettes     Quit date: 1987   • Smokeless tobacco: Never Used   • Alcohol use No   • Drug use: No   • Sexual activity: Defer     Other Topics Concern   • Not on file     Social History Narrative       Review of Systems   Constitution: Negative for fever and night sweats.   HENT: Negative for ear pain and stridor.    Eyes: Negative for discharge and visual halos.   Cardiovascular: Positive for dyspnea on exertion. Negative for cyanosis.   Respiratory: Negative for hemoptysis and sputum production.    Hematologic/Lymphatic: Negative for adenopathy.   Skin: Negative for nail changes and unusual hair distribution.   Musculoskeletal: Positive for arthritis. Negative for gout and joint swelling.   Gastrointestinal: Negative for bowel incontinence and flatus.   Genitourinary: Negative for dysuria and flank pain.   Neurological: Negative for seizures and tremors.   Psychiatric/Behavioral: Negative for altered mental status. The patient is not nervous/anxious.             Objective:     Vitals:    10/25/17 0952   BP: 108/58   Pulse: 63   Weight: 239 lb (108 kg)   Height: 69\" (175.3 cm)         Physical Exam   Constitutional: He is oriented to person, place, and time.   HENT:   Head: Normocephalic and atraumatic.   Eyes: Right eye exhibits no discharge. Left eye exhibits no discharge.   Neck: No JVD present. No thyromegaly present.   Cardiovascular: Normal rate and regular rhythm.  Exam reveals no gallop and no friction rub.    No murmur heard.  Pulmonary/Chest: Effort normal. He has decreased breath sounds. He has rhonchi. He has no rales.   Abdominal: Soft. Bowel sounds are normal. There is no " tenderness.   Musculoskeletal: Normal range of motion. He exhibits no edema or deformity.   Neurological: He is alert and oriented to person, place, and time. He exhibits normal muscle tone.   Skin: Skin is warm and dry. No erythema.   Psychiatric: He has a normal mood and affect. His behavior is normal. Thought content normal.         ECG 12 Lead  Date/Time: 10/25/2017 10:40 AM  Performed by: RASHAD HOWARD  Authorized by: RASHAD HOWARD   Comparison: compared with previous ECG   Similar to previous ECG  Rhythm: sinus rhythm  Conduction: right bundle branch block and LPFB  Clinical impression: abnormal ECG            Lab Review:       Assessment:          Diagnosis Plan   1. Paroxysmal atrial fibrillation     2. Chronic diastolic congestive heart failure     3. Interstitial lung disease     4. Pulmonary hypertension     5. Coronary artery disease involving native coronary artery of native heart without angina pectoris            Plan:         AF and flutter wise he is doing fine. He has a marked reduction of tachy after second ablation.  On warfarin and asa -- stents and AF  Metoprolol seems to be well tolerated -- no wheezing and BP ok     Pulmonary disease and right heart issues the big problem.                   I spent 20 minutes or more w pt and chart.

## 2017-10-30 NOTE — PROGRESS NOTES
Subjective   Kenan Garrison is a 72 y.o. male.   Patient with atrial fibrillation, anticoagulation with Coumadin for same here for follow-up doing well overall  History of Present Illness   Breathing is doing fairly well actually status quo blood pressures been satisfactory also    Review of Systems   All other systems reviewed and are negative.      Objective   Vitals:    10/30/17 1027   BP: 98/68   Pulse: 76   Temp: 97.6 °F (36.4 °C)   SpO2: 92%   Weight: 243 lb (110 kg) (pt reported)     Physical Exam   Constitutional: He appears well-developed and well-nourished.   HENT:   Head: Normocephalic and atraumatic.   Cardiovascular: Normal rate, regular rhythm and normal heart sounds.    Pulmonary/Chest: Effort normal and breath sounds normal.   Nursing note and vitals reviewed.      Lab Results   Component Value Date    INR 2.50 (A) 10/30/2017    INR 2.00 (A) 10/16/2017    INR 2.30 (A) 10/09/2017       Procedures    Assessment/Plan 1 atrial fibrillation    2 anticoagulation with Coumadin plan    Cont same  inr 1mo  current dose is 5/2.5/2.5/2.5    Much of this encounter note is an electronic transcription/translation of spoken language to printed text.  The electronic translation of spoken language may permit erroneous, or at times, nonsensical words or phrases to be inadvertently transcribed.  Although I have reviewed the note for such errors, some may still exist.

## 2017-11-14 NOTE — DISCHARGE INSTRUCTIONS
Start Imdur once a day.  Take 1 -2 hours after taking morning medications.  Hold if your blood pressure is less than 100/60.  Please return to the ED if you develop more episodes of chest pain.

## 2017-11-14 NOTE — ED PROVIDER NOTES
" EMERGENCY DEPARTMENT ENCOUNTER    CHIEF COMPLAINT  Chief Complaint: Chest Pain  History given by: Patient  History limited by:   Room Number: 12/12  PMD: Dileep Mccall MD  Cardiologist: Dr. Suarez  Pulmonologist: Dr. Ojeda    HPI:  Pt is a 72 y.o. male who presents complaining of L sided chest pain that onset today at 1850. Pt reports that the pain radiated to the L arm and to teeth. Pt took 1x NTG at home which improved sxs. He was also given 1x NTG by EMS. Pt reports sxs are exacerbated by exertion and onset after returning from the restroom. He states some SOA with pain and diaphoresis. He rates the pain at a 8/10 at maximum and states that pain is mostly resolved at this time. Pt has an extensive cardiac hx.    Duration: 2 hours  Onset: sudden  Timing: constant  Location: L chest  Radiation: L arm, teeth  Quality: \"pain\"  Intensity/Severity: 8/10 at maximum, mild currently  Progression: improved  Associated Symptoms: SOA, diaphoresis   Aggravating Factors: exertion  Alleviating Factors: rest, NTG  Previous Episodes: extensive cardiac hx  Treatment before arrival: took 2x NTG which improved sxs.     PAST MEDICAL HISTORY  Active Ambulatory Problems     Diagnosis Date Noted   • Bundle branch block, right 02/01/2016   • Coronary artery disease 02/01/2016   • Awareness of heartbeats 02/01/2016   • HLD (hyperlipidemia) 02/01/2016   • BP (high blood pressure) 02/01/2016   • Loud breathing during sleep 02/01/2016   • Tachycardia, paroxysmal 02/01/2016   • Greater trochanteric bursitis of right hip 03/16/2016   • Chest discomfort 03/17/2016   • Ankle edema 03/17/2016   • Dysphagia 04/21/2016   • Chronic congestive heart failure 04/21/2016   • Chest pain at rest 06/30/2016   • Precordial chest pain 09/14/2016   • Paroxysmal a-fib 10/26/2016   • Community acquired bacterial pneumonia 11/06/2016   • Acute on chronic respiratory failure with hypoxia 11/07/2016   • Interstitial lung disease 11/10/2016   • Acute and " chronic respiratory failure with hypoxia 11/10/2016   • Diastolic CHF, acute on chronic 11/10/2016   • AF (atrial fibrillation) 12/13/2016   • Precordial pain 01/17/2017   • Atrial flutter with rapid ventricular response 01/20/2017   • A-fib 01/31/2017   • Atrial fibrillation 01/31/2017   • Atypical atrial flutter 02/01/2017   • Esophageal spasm 02/13/2017   • Chest pain 06/19/2017   • Anticoagulation goal of INR 2 to 3 08/15/2017   • Pulmonary hypertension 08/28/2017   • BREANNA (obstructive sleep apnea) 09/18/2017     Resolved Ambulatory Problems     Diagnosis Date Noted   • CKD (chronic kidney disease) stage 2, GFR 60-89 ml/min 06/20/2017     Past Medical History:   Diagnosis Date   • Abnormal electrocardiogram    • Atrial fibrillation    • BPH (benign prostatic hyperplasia)    • Bundle branch block, left hemiblock    • Cancer    • Cardiovascular risk factor    • Chest pain    • CHF (congestive heart failure)    • CKD (chronic kidney disease) stage 2, GFR 60-89 ml/min    • COPD (chronic obstructive pulmonary disease)    • Coronary artery disease    • Dyslipidemia    • Fatigue    • Generalized osteoarthritis of unspecified site    • H/O cardiac radiofrequency ablation    • Hepatitis    • Hyperlipidemia    • Hypertension    • Jaundice    • Mitral valve insufficiency    • Obesity    • Pneumonia    • Pulmonary fibrosis    • Respiratory failure    • Right bundle branch block    • Sleep apnea    • SOB (shortness of breath)    • Urinary stone        PAST SURGICAL HISTORY  Past Surgical History:   Procedure Laterality Date   • BLADDER SURGERY      cancer  approx 3yrs ago   • CARDIAC ABLATION     • CARDIAC CATHETERIZATION N/A 6/22/2017    Procedure: Right and Left Heart Cath;  Surgeon: Herman Gonzalez MD;  [place] [facility] [place];  Service:    • CARDIAC CATHETERIZATION N/A 6/22/2017    Procedure: Stent TANNER coronary;  Surgeon: Herman Gonzalez MD;  [place] [facility] [place];  Service:     • CARDIAC CATHETERIZATION Left 8/25/2017    Procedure: Cardiac Catheterization/Vascular Study R/L/C w SAT RUN;  Surgeon: Og Bautista MD;  Location: [facility] CATH INVASIVE LOCATION;  Service:    • CARDIAC CATHETERIZATION N/A 8/25/2017    Procedure: Left Heart Cath;  Surgeon: Og Bautista MD;  Location: [facility] CATH INVASIVE LOCATION;  Service:    • CARDIAC CATHETERIZATION N/A 8/25/2017    Procedure: Left ventriculography;  Surgeon: Og Bautista MD;  Location: [facility] CATH INVASIVE LOCATION;  Service:    • CARDIAC CATHETERIZATION N/A 8/25/2017    Procedure: Coronary angiography;  Surgeon: Og Bautista MD;  Location: [facility] CATH INVASIVE LOCATION;  Service:    • CARDIAC CATHETERIZATION N/A 8/25/2017    Procedure: Right Heart Cath;  Surgeon: Og Bautista MD;  Location: [facility] CATH INVASIVE LOCATION;  Service:    • CARDIAC ELECTROPHYSIOLOGY PROCEDURE N/A 10/26/2016    Procedure: Ablation atrial fibrillation;  Surgeon: Primo Dewitt MD;  Location: [facility] CATH INVASIVE LOCATION;  Service:    • CARDIAC ELECTROPHYSIOLOGY PROCEDURE N/A 2/2/2017    Procedure: Ablation atrial flutter;  Surgeon: Lee Jung MD;  Location: [facility] CATH INVASIVE LOCATION;  Service:    • CARDIAC ELECTROPHYSIOLOGY PROCEDURE N/A 2/2/2017    Procedure: Ablation atrial fibrillation;  Surgeon: Lee Jung MD;  Location: [facility] CATH INVASIVE LOCATION;  Service:    • COLONOSCOPY     • CORONARY ANGIOPLASTY WITH STENT PLACEMENT     • CORONARY ANGIOPLASTY WITH STENT PLACEMENT     • ENDOSCOPY N/A 7/26/2016    Procedure: ESOPHAGOGASTRODUODENOSCOPY with esophageal dilation #48 and #52 Mckeon dilators;  Surgeon: Sebastian Ho MD;  Location: [facility] ENDOSCOPY;  Service:    • KNEE ARTHROSCOPY Right    • THROMBOENDARTERECTOMY      CAROTID   • TONSILLECTOMY     • TONSILLECTOMY AND ADENOIDECTOMY         FAMILY HISTORY  Family History   Problem Relation Age of Onset   • Coronary artery disease Other    • Heart disease Mother    • Stroke Mother    • Diabetes Mother     • Hypertension Mother    • Aneurysm Mother        SOCIAL HISTORY  Social History     Social History   • Marital status:      Spouse name: N/A   • Number of children: N/A   • Years of education: N/A     Occupational History   • Not on file.     Social History Main Topics   • Smoking status: Former Smoker     Packs/day: 3.50     Years: 22.00     Types: Cigarettes     Quit date: 1987   • Smokeless tobacco: Never Used   • Alcohol use No   • Drug use: No   • Sexual activity: Defer     Other Topics Concern   • Not on file     Social History Narrative       ALLERGIES  Albuterol; Amiodarone; Biaxin [clarithromycin]; Lipitor [atorvastatin]; Pravastatin; and Allopurinol    REVIEW OF SYSTEMS  Review of Systems   Constitutional: Positive for diaphoresis. Negative for activity change, appetite change and fever.   HENT: Negative for congestion and sore throat.    Eyes: Negative.    Respiratory: Positive for shortness of breath. Negative for cough.    Cardiovascular: Positive for chest pain. Negative for leg swelling.   Gastrointestinal: Negative for abdominal pain, diarrhea and vomiting.   Endocrine: Negative.    Genitourinary: Negative for decreased urine volume and dysuria.   Musculoskeletal: Negative for neck pain.   Skin: Negative for rash and wound.   Allergic/Immunologic: Negative.    Neurological: Negative for weakness, numbness and headaches.   Hematological: Negative.    Psychiatric/Behavioral: Negative.    All other systems reviewed and are negative.      PHYSICAL EXAM  ED Triage Vitals   Temp Heart Rate Resp BP SpO2   11/13/17 2016 11/13/17 2016 11/13/17 2016 11/13/17 2016 11/13/17 2016   97.9 °F (36.6 °C) 62 16 135/73 98 %      Temp src Heart Rate Source Patient Position BP Location FiO2 (%)   -- -- -- -- --              Physical Exam   Constitutional: He is oriented to person, place, and time. He appears distressed.   HENT:   Head: Normocephalic and atraumatic.   Eyes: EOM are normal. Pupils are equal,  round, and reactive to light.   Neck: Normal range of motion. Neck supple. No JVD present.   Cardiovascular: Normal rate, regular rhythm and normal heart sounds.    Pulmonary/Chest: He is in respiratory distress (mild). He has rales in the right lower field.   Abdominal: Soft. There is no tenderness. There is no rebound and no guarding.   Musculoskeletal: Normal range of motion. He exhibits edema (2+ BLE).   Neurological: He is alert and oriented to person, place, and time. He has normal sensation and normal strength.   Skin: Skin is warm and dry.   Psychiatric: Mood and affect normal.   Nursing note and vitals reviewed.      LAB RESULTS  Lab Results (last 24 hours)     Procedure Component Value Units Date/Time    Protime-INR [[id number]]  (Abnormal) Collected:  11/13/17 2043    Specimen:  Blood Updated:  11/13/17 2108     Protime 27.1 (H) Seconds      INR 2.61 (H)    CBC & Differential [[id number]] Collected:  11/13/17 2043    Specimen:  Blood Updated:  11/13/17 2055    Narrative:       The following orders were created for panel order CBC & Differential.  Procedure                               Abnormality         Status                     ---------                               -----------         ------                     CBC Auto Differential[[id number]]        Abnormal            Final result                 Please view results for these tests on the individual orders.    Comprehensive Metabolic Panel [[id number]]  (Abnormal) Collected:  11/13/17 2043    Specimen:  Blood Updated:  11/13/17 2120     Glucose 123 (H) mg/dL      BUN 23 mg/dL      Creatinine 1.49 (H) mg/dL      Sodium 144 mmol/L      Potassium 3.5 mmol/L      Chloride 103 mmol/L      CO2 27.4 mmol/L      Calcium 9.4 mg/dL      Total Protein 6.9 g/dL      Albumin 4.10 g/dL      ALT (SGPT) 12 U/L      AST (SGOT) 13 U/L      Alkaline Phosphatase 73 U/L      Total Bilirubin 0.4 mg/dL      eGFR Non African Amer 47 (L) mL/min/1.73      Globulin 2.8 gm/dL       A/G Ratio 1.5 g/dL      BUN/Creatinine Ratio 15.4     Anion Gap 13.6 mmol/L     Troponin [[id number]]  (Normal) Collected:  11/13/17 2043    Specimen:  Blood Updated:  11/13/17 2120     Troponin T <0.010 ng/mL     Narrative:       Troponin T Reference Ranges:  Less than 0.03 ng/mL:    Negative for AMI  0.03 to 0.09 ng/mL:      Indeterminant for AMI  Greater than 0.09 ng/mL: Positive for AMI    BNP [[id number]]  (Abnormal) Collected:  11/13/17 2043    Specimen:  Blood Updated:  11/13/17 2118     proBNP 2319.0 (H) pg/mL     Narrative:       Among patients with dyspnea, NT-proBNP is highly sensitive for the detection of acute congestive heart failure. In addition NT-proBNP of <300 pg/ml effectively rules out acute congestive heart failure with 99% negative predictive value.    CBC Auto Differential [[id number]]  (Abnormal) Collected:  11/13/17 2043    Specimen:  Blood Updated:  11/13/17 2055     WBC 8.79 10*3/mm3      RBC 4.31 (L) 10*6/mm3      Hemoglobin 12.0 (L) g/dL      Hematocrit 38.9 (L) %      MCV 90.3 fL      MCH 27.8 pg      MCHC 30.8 (L) g/dL      RDW 14.8 (H) %      RDW-SD 49.1 fl      MPV 9.7 fL      Platelets 201 10*3/mm3      Neutrophil % 71.5 %      Lymphocyte % 18.2 (L) %      Monocyte % 8.1 %      Eosinophil % 1.1 %      Basophil % 0.6 %      Immature Grans % 0.5 %      Neutrophils, Absolute 6.29 10*3/mm3      Lymphocytes, Absolute 1.60 10*3/mm3      Monocytes, Absolute 0.71 10*3/mm3      Eosinophils, Absolute 0.10 10*3/mm3      Basophils, Absolute 0.05 10*3/mm3      Immature Grans, Absolute 0.04 (H) 10*3/mm3           I ordered the above labs and reviewed the results    RADIOLOGY  XR Chest 1 View   Final Result       Stable appearance of the chest by portable radiography.           This report was finalized on 11/13/2017 9:21 PM by Stephon Sanders MD.               I ordered the above noted radiological studies. Interpreted by radiologist. Reviewed by me in PACS.       PROCEDURES  Procedures  EKG            EKG time: 2038  Rhythm/Rate: NSR, 71BPM  P waves and AR: occasional PAC  QRS, axis: RBBB  ST and T waves: LPFB     Interpreted Contemporaneously by me, independently viewed  unchanged compared to prior      PROGRESS AND CONSULTS  ED Course   8:40 PM  Ordered blood and CXR for further evaluation.   9:57 PM  Discussed pt with Dr. Huggins (Cardiologist). Discussed the previous heart cath with Dr. Huggins. He feels that pt is safe for discharge and follow-up. Would like to have pt start on 30mg Imdur.   10:28 PM  Rechecked with pt. Informed pt of discussion with Dr. Huggins and plan to discharge with Imdur. Instructed to follow-up with cardiologist. Pt understands and agrees with plan. All concerns addressed. RTER warnings given for any return or worsening of sxs.     MEDICAL DECISION MAKING  Results were reviewed/discussed with the patient and they were also made aware of online access. Pt also made aware that some labs, such as cultures, will not be resulted during ER visit and follow up with PMD is necessary.     MDM  Number of Diagnoses or Management Options  Exertional chest pain:      Amount and/or Complexity of Data Reviewed  Clinical lab tests: ordered and reviewed  Tests in the radiology section of CPT®: ordered and reviewed  Tests in the medicine section of CPT®: reviewed and ordered (EKG - See EKG procedure note  )  Decide to obtain previous medical records or to obtain history from someone other than the patient: yes  Review and summarize past medical records: yes (Heart cath on 8/25/17 showed diffuse 3 vessel disease and pulmonary HTN)  Discuss the patient with other providers: yes (Dr. Huggins)          HEART Score (for prediction of 6-week risk of major adverse cardiac event) reviewed and/or performed as part of the patient evaluation and treatment planning process.  The result associated with this review/performance is: 6      DIAGNOSIS  Final diagnoses:   Exertional chest pain        DISPOSITION  DISCHARGE    Patient discharged in stable condition.    Reviewed implications of results, diagnosis, meds, responsibility to follow up, warning signs and symptoms of possible worsening, potential complications and reasons to return to ER.    Patient/Family voiced understanding of above instructions.    Discussed plan for discharge, as there is no emergent indication for admission.  Pt/family is agreeable and understands need for follow up and repeat testing.  Pt is aware that discharge does not mean that nothing is wrong but it indicates no emergency is present that requires admission and they must continue care with follow-up as given below or physician of their choice.     FOLLOW-UP  Nathaniel Suarez MD  [address] [address]  [phone]    Call in 1 day  For Cardiologist follow-up         Medication List      New Prescriptions          isosorbide mononitrate 30 MG 24 hr tablet   Commonly known as:  IMDUR   Take 1 tablet by mouth Daily.               Latest Documented Vital Signs:  As of 11:38 PM  BP- 130/85 HR- 90 Temp- 97.9 °F (36.6 °C) O2 sat- 96%    --  Documentation assistance provided by kacie Wallace for Dr. Barros.  Information recorded by the scribe was done at my direction and has been verified and validated by me.     Sebastian Wallace  11/13/17 [id number]       Elvin Barros MD  11/13/17 [id number]

## 2017-11-14 NOTE — ED NOTES
Sharp chest pain that began @7pm. Pt took 1 NTG at home & EMS gave 2 NTG enroute to ER. Pt reports feeling  painfree at this time.     Trisha Norwood RN  11/13/17 2015

## 2017-11-14 NOTE — PROGRESS NOTES
Clinical Pharmacy Services: Medication History    Kenan Garrison is a 72 y.o. male presenting to [facility] for   Chief Complaint   Patient presents with   • Chest Pain       He  has a past medical history of Abnormal electrocardiogram; Atrial fibrillation; BPH (benign prostatic hyperplasia); Bundle branch block, left hemiblock; Cancer; Cardiovascular risk factor; Chest pain; CHF (congestive heart failure); CKD (chronic kidney disease) stage 2, GFR 60-89 ml/min; COPD (chronic obstructive pulmonary disease); Coronary artery disease; Dyslipidemia; Fatigue; Generalized osteoarthritis of unspecified site; H/O cardiac radiofrequency ablation; Hepatitis; Hyperlipidemia; Hypertension; Jaundice; Mitral valve insufficiency; Obesity; Pneumonia; Pulmonary fibrosis; Respiratory failure; Right bundle branch block; Sleep apnea; SOB (shortness of breath); and Urinary stone.    Allergies as of 11/13/2017 - Saul as Reviewed 11/13/2017   Allergen Reaction Noted   • Albuterol  08/18/2016   • Amiodarone  11/18/2016   • Biaxin [clarithromycin]  01/21/2016   • Lipitor [atorvastatin] Myalgia 01/21/2016   • Pravastatin Myalgia 01/21/2016   • Allopurinol Rash 02/11/2017       Medication information was obtained from: Patient, medication list  Pharmacy and Phone Number: [facility] [phone]    Prior to Admission Medications     Prescriptions Last Dose Informant Patient Reported? Taking?    aspirin 81 MG EC tablet 11/13/2017 Self Yes Yes    Take 81 mg by mouth Daily.    coenzyme Q10 100 MG capsule 11/13/2017 Self Yes Yes    Take 200 mg by mouth Daily.    furosemide (LASIX) 40 MG tablet 11/13/2017 Self Yes Yes    Take 80 mg by mouth Every Morning.    furosemide (LASIX) 40 MG tablet 11/13/2017 Self Yes Yes    Take 40 mg by mouth Daily With Lunch.    metoprolol succinate XL (TOPROL-XL) 50 MG 24 hr tablet 11/13/2017 Self Yes Yes    Take 50 mg by mouth 2 (Two) Times a Day.    nitroglycerin (NITROSTAT) 0.4 MG SL tablet 11/13/2017  Self Yes Yes    Place 0.4 mg under the tongue Every 5 (Five) Minutes As Needed for Chest Pain. Take no more than 3 doses in 15 minutes.    omeprazole (priLOSEC) 40 MG capsule 11/13/2017 Self Yes Yes    Take 40 mg by mouth Daily.    potassium chloride (K-DUR,KLOR-CON) 20 MEQ CR tablet 11/13/2017 Self No Yes    Take 1 tablet by mouth Daily.    tamsulosin (FLOMAX) 0.4 MG capsule 24 hr capsule 11/12/2017 Self No Yes    Take 2 capsules by mouth Every Night.    traMADol (ULTRAM) 50 MG tablet 11/13/2017 Self No Yes    Take 1 tablet by mouth Every 6 (Six) Hours As Needed for Moderate Pain .    warfarin (COUMADIN) 2.5 MG tablet 11/13/2017 Self Yes Yes    Take 2.5 mg by mouth See Admin Instructions. Taking 2.5 mg daily, except every 4th day taking 5 mg    warfarin (COUMADIN) 5 MG tablet 11/11/2017 Self Yes Yes    Take 5 mg by mouth See Admin Instructions. Taking 2.5 mg daily except every 4th day taking 5 mg            Medication notes: Patient current Warfarin dose varies. 2.5 mg daily, except every 4th day, taking 5 mg    This medication list is complete to the best of my knowledge as of 11/13/2017    Please call if questions.    Aiyana Butcher, Medication History Technician  11/13/2017 10:11 PM

## 2017-11-16 NOTE — TELEPHONE ENCOUNTER
"ER F/U phone call:   Pt states that he is not \"feeling much better\". He is to see his PCP on 11-27-17, and to see his pulmonologist later that week. No other questions or concerns voiced at this time. Taniya Sarah RN    "

## 2017-11-17 NOTE — TELEPHONE ENCOUNTER
----- Message from Kenan Garrison sent at 11/17/2017  3:51 AM EST -----  Regarding: Visit Follow-Up Question  Contact: [phone]  Was in ER this week they told me to follow up with Dr Funes should I make an appointment have one in march

## 2017-11-17 NOTE — TELEPHONE ENCOUNTER
----- Message from Kenan Garrison sent at 11/17/2017  3:51 AM EST -----  Regarding: Visit Follow-Up Question  Contact: [phone]  Was in ER this week they told me to follow up with Dr Funes should I make an appointment have one in march

## 2017-11-20 NOTE — ED PROVIDER NOTES
"EMERGENCY DEPARTMENT ENCOUNTER    CHIEF COMPLAINT  Chief Complaint: rash  History given by:Pt  History limited by:N/A  Room Number: 31/31  PMD: Dileep Mccall MD      HPI:  Pt is a 72 y.o. male who presents with a rash for the last 4 days. The pt has been on Isosorbide since 11/14/17 for his angina, and states he has been having a new itching/burning rash \"all over\" since he started on the Isosorbide. He has a h/o a-fib with Coumadin. He denies any recent changes to his soap, swelling of the mouth or tongue, or increased SOB that is worst than usual. Pt is on oxygen at home.      Duration: 6 days ago  Timing:constant  Location:generalized  Quality:\"itching/burning\"  Intensity/Severity:moderate  Progression:worsened   Associated Symptoms:none  Aggravating Factors:none  Alleviating Factors:none  Previous Episodes:No history of a rash in the past.   Treatment before arrival:No treatment for his rash PTA.       MEDICAL RECORD REVIEW  Pt comes in with an itchy/burning rash since starting on Isosorbide on 11/14/17. Pt has a h/o a-fib and is currently on Coumadin for it.     PAST MEDICAL HISTORY  Active Ambulatory Problems     Diagnosis Date Noted   • Bundle branch block, right 02/01/2016   • Coronary artery disease 02/01/2016   • Awareness of heartbeats 02/01/2016   • HLD (hyperlipidemia) 02/01/2016   • BP (high blood pressure) 02/01/2016   • Loud breathing during sleep 02/01/2016   • Tachycardia, paroxysmal 02/01/2016   • Greater trochanteric bursitis of right hip 03/16/2016   • Chest discomfort 03/17/2016   • Ankle edema 03/17/2016   • Dysphagia 04/21/2016   • Chronic congestive heart failure 04/21/2016   • Chest pain at rest 06/30/2016   • Precordial chest pain 09/14/2016   • Paroxysmal a-fib 10/26/2016   • Community acquired bacterial pneumonia 11/06/2016   • Acute on chronic respiratory failure with hypoxia 11/07/2016   • Interstitial lung disease 11/10/2016   • Acute and chronic respiratory failure with hypoxia " 11/10/2016   • Diastolic CHF, acute on chronic 11/10/2016   • AF (atrial fibrillation) 12/13/2016   • Precordial pain 01/17/2017   • Atrial flutter with rapid ventricular response 01/20/2017   • A-fib 01/31/2017   • Atrial fibrillation 01/31/2017   • Atypical atrial flutter 02/01/2017   • Esophageal spasm 02/13/2017   • Chest pain 06/19/2017   • Anticoagulation goal of INR 2 to 3 08/15/2017   • Pulmonary hypertension 08/28/2017   • BREANNA (obstructive sleep apnea) 09/18/2017     Resolved Ambulatory Problems     Diagnosis Date Noted   • CKD (chronic kidney disease) stage 2, GFR 60-89 ml/min 06/20/2017     Past Medical History:   Diagnosis Date   • Abnormal electrocardiogram    • Atrial fibrillation    • BPH (benign prostatic hyperplasia)    • Bundle branch block, left hemiblock    • Cancer    • Cardiovascular risk factor    • Chest pain    • CHF (congestive heart failure)    • CKD (chronic kidney disease) stage 2, GFR 60-89 ml/min    • COPD (chronic obstructive pulmonary disease)    • Coronary artery disease    • Dyslipidemia    • Fatigue    • Generalized osteoarthritis of unspecified site    • H/O cardiac radiofrequency ablation    • Hepatitis    • Hyperlipidemia    • Hypertension    • Jaundice    • Mitral valve insufficiency    • Obesity    • Pneumonia    • Pulmonary fibrosis    • Respiratory failure    • Right bundle branch block    • Sleep apnea    • SOB (shortness of breath)    • Urinary stone        PAST SURGICAL HISTORY  Past Surgical History:   Procedure Laterality Date   • BLADDER SURGERY      cancer  approx 3yrs ago   • CARDIAC ABLATION     • CARDIAC CATHETERIZATION N/A 6/22/2017    Procedure: Right and Left Heart Cath;  Surgeon: Herman Gonzalez MD;  Location: [facility] CATH INVASIVE LOCATION;  Service:    • CARDIAC CATHETERIZATION N/A 6/22/2017    Procedure: Stent TANNER coronary;  Surgeon: Herman Gonzalez MD;  [facility];  Service:    • CARDIAC CATHETERIZATION Left  8/25/2017    Procedure: Cardiac Catheterization/Vascular Study R/L/C w SAT RUN;  Surgeon: Og Bautista MD;  Location: [facility] CATH INVASIVE LOCATION;  Service:    • CARDIAC CATHETERIZATION N/A 8/25/2017    Procedure: Left Heart Cath;  Surgeon: Og Bautista MD;  Location: [facility] CATH INVASIVE LOCATION;  Service:    • CARDIAC CATHETERIZATION N/A 8/25/2017    Procedure: Left ventriculography;  Surgeon: Og Bautista MD;  Location: [facility] CATH INVASIVE LOCATION;  Service:    • CARDIAC CATHETERIZATION N/A 8/25/2017    Procedure: Coronary angiography;  Surgeon: Og Bautista MD;  Location: [facility] CATH INVASIVE LOCATION;  Service:    • CARDIAC CATHETERIZATION N/A 8/25/2017    Procedure: Right Heart Cath;  Surgeon: Og Bautista MD;  Location: [facility] CATH INVASIVE LOCATION;  Service:    • CARDIAC ELECTROPHYSIOLOGY PROCEDURE N/A 10/26/2016    Procedure: Ablation atrial fibrillation;  Surgeon: Primo Dewitt MD;  Location: [facility] CATH INVASIVE LOCATION;  Service:    • CARDIAC ELECTROPHYSIOLOGY PROCEDURE N/A 2/2/2017    Procedure: Ablation atrial flutter;  Surgeon: Lee Jung MD;  Location: [facility] INVASIVE LOCATION;  Service:    • CARDIAC ELECTROPHYSIOLOGY PROCEDURE N/A 2/2/2017    Procedure: Ablation atrial fibrillation;  Surgeon: Lee Jung MD;  Location: [facility] CATH INVASIVE LOCATION;  Service:    • COLONOSCOPY     • CORONARY ANGIOPLASTY WITH STENT PLACEMENT     • CORONARY ANGIOPLASTY WITH STENT PLACEMENT     • ENDOSCOPY N/A 7/26/2016    Procedure: ESOPHAGOGASTRODUODENOSCOPY with esophageal dilation #48 and #52 Mckeon dilators;  Surgeon: Sebastian Ho MD;  Location: [facility] ENDOSCOPY;  Service:    • KNEE ARTHROSCOPY Right    • THROMBOENDARTERECTOMY      CAROTID   • TONSILLECTOMY     • TONSILLECTOMY AND ADENOIDECTOMY         FAMILY HISTORY  Family History   Problem Relation Age of Onset   • Coronary artery disease Other    • Heart disease Mother    • Stroke Mother    • Diabetes Mother    • Hypertension Mother    •  Aneurysm Mother        SOCIAL HISTORY  Social History     Social History   • Marital status:      Spouse name: N/A   • Number of children: N/A   • Years of education: N/A     Occupational History   • Not on file.     Social History Main Topics   • Smoking status: Former Smoker     Packs/day: 3.50     Years: 22.00     Types: Cigarettes     Quit date: 1987   • Smokeless tobacco: Never Used   • Alcohol use No   • Drug use: No   • Sexual activity: Defer     Other Topics Concern   • Not on file     Social History Narrative       ALLERGIES  Albuterol; Amiodarone; Biaxin [clarithromycin]; Lipitor [atorvastatin]; Pravastatin; and Allopurinol    REVIEW OF SYSTEMS  Review of Systems   Constitutional: Negative for activity change, appetite change and fever.   HENT: Negative for congestion and sore throat.    Eyes: Negative.    Respiratory: Negative for cough and shortness of breath.    Cardiovascular: Negative for chest pain and leg swelling.   Gastrointestinal: Negative for abdominal pain, diarrhea and vomiting.   Endocrine: Negative.    Genitourinary: Negative for decreased urine volume and dysuria.   Musculoskeletal: Negative for neck pain.   Skin: Positive for rash. Negative for wound.   Allergic/Immunologic: Negative.    Neurological: Negative for weakness, numbness and headaches.   Hematological: Negative.    Psychiatric/Behavioral: Negative.    All other systems reviewed and are negative.      PHYSICAL EXAM  ED Triage Vitals   Temp Heart Rate Resp BP SpO2   11/20/17 1010 11/20/17 0932 11/20/17 0932 11/20/17 0932 11/20/17 0932   97.2 °F (36.2 °C) 75 18 103/64 92 %      Temp src Heart Rate Source Patient Position BP Location FiO2 (%)   -- -- -- -- --              Physical Exam   Constitutional: He is oriented to person, place, and time and well-developed, well-nourished, and in no distress. No distress.   HENT:   Head: Normocephalic and atraumatic.   Mouth/Throat: Oropharynx is clear and moist.   No mucocutaneous  eruption. No respiratory distress.     Eyes: EOM are normal. Pupils are equal, round, and reactive to light.   Neck: Normal range of motion. Neck supple.   Cardiovascular: Normal rate, regular rhythm and normal heart sounds.    Pulmonary/Chest: Effort normal and breath sounds normal. No respiratory distress. He has no wheezes. He exhibits no tenderness.   Pt has oxygen in place (chronic).    Abdominal: Soft. He exhibits no distension. There is no tenderness. There is no rebound and no guarding.   Musculoskeletal: Normal range of motion. He exhibits no edema.   No joint swelling.    Lymphadenopathy:     He has no cervical adenopathy.   Neurological: He is alert and oriented to person, place, and time.   Skin: Skin is warm and dry. Rash (Pt has a maculopapular blanching rash scattered on his abdomen, and diffusely in his upper thighs and groin. It is more scatered in his lower exrmities and stops at the knee. He has petechiae bilaterally in the anterior shins (chronic).  ) noted. No pallor.   Psychiatric: Mood, memory, affect and judgment normal.   Nursing note and vitals reviewed.      LAB RESULTS  Recent Results (from the past 24 hour(s))   Comprehensive Metabolic Panel    Collection Time: 11/20/17 11:22 AM   Result Value Ref Range    Glucose 103 (H) 65 - 99 mg/dL    BUN 19 8 - 23 mg/dL    Creatinine 1.43 (H) 0.76 - 1.27 mg/dL    Sodium 143 136 - 145 mmol/L    Potassium 3.7 3.5 - 5.2 mmol/L    Chloride 103 98 - 107 mmol/L    CO2 26.4 22.0 - 29.0 mmol/L    Calcium 9.4 8.6 - 10.5 mg/dL    Total Protein 6.8 6.0 - 8.5 g/dL    Albumin 3.90 3.50 - 5.20 g/dL    ALT (SGPT) 10 1 - 41 U/L    AST (SGOT) 12 1 - 40 U/L    Alkaline Phosphatase 76 39 - 117 U/L    Total Bilirubin 0.5 0.1 - 1.2 mg/dL    eGFR Non African Amer 49 (L) >60 mL/min/1.73    Globulin 2.9 gm/dL    A/G Ratio 1.3 g/dL    BUN/Creatinine Ratio 13.3 7.0 - 25.0    Anion Gap 13.6 mmol/L   Protime-INR    Collection Time: 11/20/17 11:22 AM   Result Value Ref Range     Protime 23.5 (H) 11.7 - 14.2 Seconds    INR 2.17 (H) 0.90 - 1.10   CBC Auto Differential    Collection Time: 11/20/17 11:22 AM   Result Value Ref Range    WBC 10.10 4.50 - 10.70 10*3/mm3    RBC 4.35 (L) 4.60 - 6.00 10*6/mm3    Hemoglobin 12.0 (L) 13.7 - 17.6 g/dL    Hematocrit 39.6 (L) 40.4 - 52.2 %    MCV 91.0 79.8 - 96.2 fL    MCH 27.6 27.0 - 32.7 pg    MCHC 30.3 (L) 32.6 - 36.4 g/dL    RDW 15.3 (H) 11.5 - 14.5 %    RDW-SD 51.6 37.0 - 54.0 fl    MPV 9.9 6.0 - 12.0 fL    Platelets 191 140 - 500 10*3/mm3    Neutrophil % 72.9 42.7 - 76.0 %    Lymphocyte % 12.3 (L) 19.6 - 45.3 %    Monocyte % 10.9 5.0 - 12.0 %    Eosinophil % 3.1 0.3 - 6.2 %    Basophil % 0.3 0.0 - 1.5 %    Immature Grans % 0.5 0.0 - 0.5 %    Neutrophils, Absolute 7.37 1.90 - 8.10 10*3/mm3    Lymphocytes, Absolute 1.24 0.90 - 4.80 10*3/mm3    Monocytes, Absolute 1.10 0.20 - 1.20 10*3/mm3    Eosinophils, Absolute 0.31 0.00 - 0.70 10*3/mm3    Basophils, Absolute 0.03 0.00 - 0.20 10*3/mm3    Immature Grans, Absolute 0.05 (H) 0.00 - 0.03 10*3/mm3       I ordered the above labs and reviewed the results    EKG  Ekg was interpreted by Dr. Raghavendra Medrano MD.       COURSE & MEDICAL DECISION MAKING  Pertinent Labs and Imaging studies that were ordered and reviewed are noted above.  Results were reviewed/discussed with the patient and they were also made aware of online assess.  Pt also made aware that some labs, such as cultures, will not be resulted during ER visit and follow up with PMD is necessary.       PROGRESS AND CONSULTS    Progress Notes:    ED Course     1100 Ordered blood work including CBC, Protime-INR, and CMP for further evaluation. Ordered IVF bolus for hydration, Benadryl, Pepcid, and Solu-Medrol to treat his allergic reaction.     1117 Reviewed pt's history and workup with Dr. Dr.Eric Marcela MD.  After a bedside evaluation; Dr. Medrano agrees with the plan of care.    1234 Placed consult to Cardiology.     1242 Ordered Zofran for nausea.     1244  "Spoke with Taniya from 's (cardiologist) office. She does not advise to change or add any additional medicines, with the exception of discontinuing the isosorbide.  Patient will go home on a medrol dose pack.     1301 The patient's history, physical exam, and lab findings were discussed with the physician, who also performed a face to face history and physical exam.  I discussed all results and noted any abnormalities with patient.  Discussed absolute need to recheck abnormalities with their family physician.  I answered any of the patient's questions.  Discussed plan for discharge, as there is no emergent indication for admission.  Pt is agreeable and understands need for follow up and repeat testing.  Pt is aware that discharge does not mean that nothing is wrong but it indicates no emergency is present and they must continue care with their family physician.  Pt is discharged with instructions to follow up with primary care doctor to have their blood pressure rechecked.       MEDICATIONS GIVEN IN ER  Medications   sodium chloride 0.9 % flush 10 mL (not administered)   methylPREDNISolone sodium succinate (SOLU-Medrol) injection 125 mg (125 mg Intravenous Given 11/20/17 1129)   famotidine (PEPCID) injection 20 mg (20 mg Intravenous Given 11/20/17 1127)   diphenhydrAMINE (BENADRYL) injection 25 mg (25 mg Intravenous Given 11/20/17 1126)   sodium chloride 0.9 % bolus 500 mL (0 mL Intravenous Stopped 11/20/17 1238)   ondansetron (ZOFRAN) injection 4 mg (4 mg Intravenous Given 11/20/17 1258)       /78  Pulse 64  Temp 97.2 °F (36.2 °C)  Resp 18  Ht 69\" (175.3 cm)  Wt 248 lb (112 kg)  SpO2 96%  BMI 36.62 kg/m2      DIAGNOSIS  Final diagnoses:   Allergic reaction, initial encounter       FOLLOW UP   Dileep Mccall Jr., MD  [address] [facility] [address] [phone]          Nathaniel Suarez MD  [address] [id number] [phone]            RX     Medication List    "   New Prescriptions          MethylPREDNISolone 4 MG tablet   Commonly known as:  MEDROL (REYES)   Take as directed on package instructions.         Changed          furosemide 40 MG tablet   Commonly known as:  LASIX   What changed:  Another medication with the same name was removed. Continue   taking this medication, and follow the directions you see here.       tamsulosin 0.4 MG capsule 24 hr capsule   Commonly known as:  FLOMAX   Take 2 capsules by mouth Every Night.   What changed:    - how much to take  - when to take this         Stop          isosorbide mononitrate 30 MG 24 hr tablet   Commonly known as:  IMDUR           Documentation assistance provided by kacie Perez for Alyssa Luna PA-C.  Information recorded by the scribe was done at my direction and has been verified and validated by me.  Electronically signed by Aura Perez on 11/20/2017 at time 1:33 PM           Malu Dillard  11/20/17 [id number]       Alyssa Luna PA-C  11/20/17 5710

## 2017-11-20 NOTE — ED PROVIDER NOTES
EMERGENCY DEPARTMENT ENCOUNTER    CHIEF COMPLAINT  Chief Complaint: Cardiac Arrest  History given by: EMS and wife  History limited by: Acuity of condition  Room Number: BRII/BRII  PMD: Dileep Mccall MD      HPI:  Pt is a 72 y.o. male who presents via EMS with full cardiac arrest.  Pt was seen here in the ER earlier today for a rash that was thought to be due to isordil. He was given solumedrol and benadryl.  See previous notes. Pt was being driven home by his wife.  During the drive home, the pt was experiencing SOB and chest pain.  Wife states that pt experiences  Angina regularly, and she was therefore not initially very concerned.  After his SOB worsened, patient had a syncopal episode. At that point, she stopped the car and called for an ambulance.  By the time the ambulance arrived, the Pt had stopped breathing altogether, he had no pulse, and his lips were blue.  EMS found him with no pulse.  There was no bystander CPR.  Pt has been on home O2 for at least a year, per wife.       Location of Arrest - Other  Witnessed Arrest - Yes  Bystander CPR - No  Time of Collapse - Between 1426 and 1437  Time CPR Initiated - 1439  [facility] on Scene - EMS   Time on Scene - 1437  AED Used - No  Dispatch Time - 1426  EMS on Scene Time - 1437   [facility]  Initial Cardiac Rhythm - PEA  ROSC in Field - No  Time of Arrival to [facility] ED - 1506  STEMI - No  Treatment Prior to Arrival - Eye gel, green, dextrose 25 g, narcan 2 mill.  Epi 1mg X4      PAST MEDICAL HISTORY  Active Ambulatory Problems     Diagnosis Date Noted   • Bundle branch block, right 02/01/2016   • Coronary artery disease 02/01/2016   • Awareness of heartbeats 02/01/2016   • HLD (hyperlipidemia) 02/01/2016   • BP (high blood pressure) 02/01/2016   • Loud breathing during sleep 02/01/2016   • Tachycardia, paroxysmal 02/01/2016   • Greater trochanteric bursitis of right hip 03/16/2016   • Chest discomfort 03/17/2016   • Ankle edema  03/17/2016   • Dysphagia 04/21/2016   • Chronic congestive heart failure 04/21/2016   • Chest pain at rest 06/30/2016   • Precordial chest pain 09/14/2016   • Paroxysmal a-fib 10/26/2016   • Community acquired bacterial pneumonia 11/06/2016   • Acute on chronic respiratory failure with hypoxia 11/07/2016   • Interstitial lung disease 11/10/2016   • Acute and chronic respiratory failure with hypoxia 11/10/2016   • Diastolic CHF, acute on chronic 11/10/2016   • AF (atrial fibrillation) 12/13/2016   • Precordial pain 01/17/2017   • Atrial flutter with rapid ventricular response 01/20/2017   • A-fib 01/31/2017   • Atrial fibrillation 01/31/2017   • Atypical atrial flutter 02/01/2017   • Esophageal spasm 02/13/2017   • Chest pain 06/19/2017   • Anticoagulation goal of INR 2 to 3 08/15/2017   • Pulmonary hypertension 08/28/2017   • BREANNA (obstructive sleep apnea) 09/18/2017     Resolved Ambulatory Problems     Diagnosis Date Noted   • CKD (chronic kidney disease) stage 2, GFR 60-89 ml/min 06/20/2017     Past Medical History:   Diagnosis Date   • Abnormal electrocardiogram    • Atrial fibrillation    • BPH (benign prostatic hyperplasia)    • Bundle branch block, left hemiblock    • Cancer    • Cardiovascular risk factor    • Chest pain    • CHF (congestive heart failure)    • CKD (chronic kidney disease) stage 2, GFR 60-89 ml/min    • COPD (chronic obstructive pulmonary disease)    • Coronary artery disease    • Dyslipidemia    • Fatigue    • Generalized osteoarthritis of unspecified site    • H/O cardiac radiofrequency ablation    • Hepatitis    • Hyperlipidemia    • Hypertension    • Jaundice    • Mitral valve insufficiency    • Obesity    • Pneumonia    • Pulmonary fibrosis    • Respiratory failure    • Right bundle branch block    • Sleep apnea    • SOB (shortness of breath)    • Urinary stone        PAST SURGICAL HISTORY  Past Surgical History:   Procedure Laterality Date   • BLADDER SURGERY      cancer  approx 3yrs ago    • CARDIAC ABLATION     • CARDIAC CATHETERIZATION N/A 6/22/2017    Procedure: Right and Left Heart Cath;  Surgeon: Herman Gonzalez MD;  Location: [facility] CATH INVASIVE LOCATION;  Service:    • CARDIAC CATHETERIZATION N/A 6/22/2017    Procedure: Stent TANNER coronary;  Surgeon: Herman Gonzalez MD;  Location: [facility] CATH INVASIVE LOCATION;  Service:    • CARDIAC CATHETERIZATION Left 8/25/2017    Procedure: Cardiac Catheterization/Vascular Study R/L/C w SAT RUN;  Surgeon: Og Bautista MD;  Location: [facility] CATH INVASIVE LOCATION;  Service:    • CARDIAC CATHETERIZATION N/A 8/25/2017    Procedure: Left Heart Cath;  Surgeon: Og Bautista MD;  Location: [facility] CATH INVASIVE LOCATION;  Service:    • CARDIAC CATHETERIZATION N/A 8/25/2017    Procedure: Left ventriculography;  Surgeon: Og Bautista MD;  Location: [facility] CATH INVASIVE LOCATION;  Service:    • CARDIAC CATHETERIZATION N/A 8/25/2017    Procedure: Coronary angiography;  Surgeon: Og Bautista MD;  Location: [facility] CATH INVASIVE LOCATION;  Service:    • CARDIAC CATHETERIZATION N/A 8/25/2017    Procedure: Right Heart Cath;  Surgeon: Og Bautista MD;  Location: [facility] CATH INVASIVE LOCATION;  Service:    • CARDIAC ELECTROPHYSIOLOGY PROCEDURE N/A 10/26/2016    Procedure: Ablation atrial fibrillation;  Surgeon: Primo Dewitt MD;  Location: [facility] CATH INVASIVE LOCATION;  Service:    • CARDIAC ELECTROPHYSIOLOGY PROCEDURE N/A 2/2/2017    Procedure: Ablation atrial flutter;  Surgeon: Lee Jung MD;  Location: [facility] CATH INVASIVE LOCATION;  Service:    • CARDIAC ELECTROPHYSIOLOGY PROCEDURE N/A 2/2/2017    Procedure: Ablation atrial fibrillation;  Surgeon: Lee Jung MD;  Location: [facility] CATH INVASIVE LOCATION;  Service:    • COLONOSCOPY     • CORONARY ANGIOPLASTY WITH STENT PLACEMENT     • CORONARY ANGIOPLASTY WITH STENT PLACEMENT     • ENDOSCOPY N/A 7/26/2016    Procedure: ESOPHAGOGASTRODUODENOSCOPY with esophageal dilation #48 and #52 Mckeon dilators;   Surgeon: Sebastian Ho MD;  Location: [facility] ENDOSCOPY;  Service:    • KNEE ARTHROSCOPY Right    • THROMBOENDARTERECTOMY      CAROTID   • TONSILLECTOMY     • TONSILLECTOMY AND ADENOIDECTOMY         FAMILY HISTORY  Family History   Problem Relation Age of Onset   • Coronary artery disease Other    • Heart disease Mother    • Stroke Mother    • Diabetes Mother    • Hypertension Mother    • Aneurysm Mother        SOCIAL HISTORY  Social History     Social History   • Marital status:      Spouse name: N/A   • Number of children: N/A   • Years of education: N/A     Occupational History   • Not on file.     Social History Main Topics   • Smoking status: Former Smoker     Packs/day: 3.50     Years: 22.00     Types: Cigarettes     Quit date: 1987   • Smokeless tobacco: Never Used   • Alcohol use No   • Drug use: No   • Sexual activity: Defer     Other Topics Concern   • Not on file     Social History Narrative       ALLERGIES  Isosorbide mononitrate [isosorbide nitrate]; Albuterol; Amiodarone; Biaxin [clarithromycin]; Lipitor [atorvastatin]; Pravastatin; and Allopurinol    REVIEW OF SYSTEMS  Review of Systems    PHYSICAL EXAM  ED Triage Vitals   Temp Pulse Resp BP SpO2   -- -- -- -- --             Temp src Heart Rate Source Patient Position BP Location FiO2 (%)   -- -- -- -- --              Physical Exam    LAB RESULTS  Lab Results (last 24 hours)     Procedure Component Value Units Date/Time    CBC & Differential [[id number]] Collected:  11/20/17 1122    Specimen:  Blood Updated:  11/20/17 1137    Narrative:       The following orders were created for panel order CBC & Differential.  Procedure                               Abnormality         Status                     ---------                               -----------         ------                     CBC Auto Differential[[id number]]        Abnormal            Final result                 Please view results for these tests on the individual orders.     Comprehensive Metabolic Panel [[id number]]  (Abnormal) Collected:  11/20/17 1122    Specimen:  Blood Updated:  11/20/17 1158     Glucose 103 (H) mg/dL      BUN 19 mg/dL      Creatinine 1.43 (H) mg/dL      Sodium 143 mmol/L      Potassium 3.7 mmol/L      Chloride 103 mmol/L      CO2 26.4 mmol/L      Calcium 9.4 mg/dL      Total Protein 6.8 g/dL      Albumin 3.90 g/dL      ALT (SGPT) 10 U/L      AST (SGOT) 12 U/L      Alkaline Phosphatase 76 U/L      Total Bilirubin 0.5 mg/dL      eGFR Non African Amer 49 (L) mL/min/1.73      Globulin 2.9 gm/dL      A/G Ratio 1.3 g/dL      BUN/Creatinine Ratio 13.3     Anion Gap 13.6 mmol/L     Protime-INR [[id number]]  (Abnormal) Collected:  11/20/17 1122    Specimen:  Blood Updated:  11/20/17 1149     Protime 23.5 (H) Seconds      INR 2.17 (H)    CBC Auto Differential [[id number]]  (Abnormal) Collected:  11/20/17 1122    Specimen:  Blood Updated:  11/20/17 1137     WBC 10.10 10*3/mm3      RBC 4.35 (L) 10*6/mm3      Hemoglobin 12.0 (L) g/dL      Hematocrit 39.6 (L) %      MCV 91.0 fL      MCH 27.6 pg      MCHC 30.3 (L) g/dL      RDW 15.3 (H) %      RDW-SD 51.6 fl      MPV 9.9 fL      Platelets 191 10*3/mm3      Neutrophil % 72.9 %      Lymphocyte % 12.3 (L) %      Monocyte % 10.9 %      Eosinophil % 3.1 %      Basophil % 0.3 %      Immature Grans % 0.5 %      Neutrophils, Absolute 7.37 10*3/mm3      Lymphocytes, Absolute 1.24 10*3/mm3      Monocytes, Absolute 1.10 10*3/mm3      Eosinophils, Absolute 0.31 10*3/mm3      Basophils, Absolute 0.03 10*3/mm3      Immature Grans, Absolute 0.05 (H) 10*3/mm3           I ordered the above labs and reviewed the results        PROCEDURES  Procedures      PROGRESS AND CONSULTS  ED Course       See code sheet for all medications given during code.      1512   Spoke with wife to get history and update her on Pt's status.  Answered all questions.    1430   Spoke wth wife to get additional history.  Updated wife on Pts status.  He had ROSC, but without  pressors his BP fades rather quickly    1533  Spoke with Pt's wife about stopping the compressions. Bedside ultrasound shows minimal wall motion consistent with PEA. Wife joined Pt at bedside.    1536  Dr. Suarez at bedside.  Resuscitative efforts stopped and patient subsequently went asystolic.        MEDICAL DECISION MAKING  Results were reviewed/discussed with the patient and they were also made aware of online access. Pt also made aware that some labs, such as cultures, will not be resulted during ER visit and follow up with PMD is necessary.     MDM       DIAGNOSIS  Final diagnoses:   Cardiac arrest       DISPOSITION  1642   Patient     Latest Documented Vital Signs:  As of 11:18 PM  BP- 174/90 HR- (!) 0 Temp-   O2 sat- (!) 0%    --  Documentation assistance provided by kacie Aguilar for Dr. Victoria.  Information recorded by the scribe was done at my direction and has been verified and validated by me.           Sanjuana Aguilar  [phone]       Tonio Victoria MD  [phone]

## 2017-11-20 NOTE — DISCHARGE INSTRUCTIONS
PLEASE READ AND REVIEW ALL DISCHARGE INSTRUCTIONS.     Please follow up with your primary care physician for any further evaluation/treatment and further management of your blood pressure.     Recheck in emergency department for any worsening and/or concerning symptoms.     Take all prescribed medicine as written and continue chronic medication.    Discontinue isosorbide.  Take medrol dose pack for rash.  Take Benadryl every 6 hours as needed for itching.    Follow up with Dr. Suarez regarding replacement medication for discontinuing isosorbide.

## 2017-11-20 NOTE — ED NOTES
"Pt started taking isosorbide on the 14th and states that since then he's been having a itching/burning rash \"all over body\" since then.        Rhina Sales RN  11/20/17 0993    "

## 2017-11-20 NOTE — ED PROVIDER NOTES
Pt presents to the ED complaining of a generalized, ertyhematous rash after being started on Isosorbide on 11/14/17. Pt also complains of increased generalized edema but denies unusual SOA. On exam, the pt has a diffuse maculopapular rash, which is worse centrally. There is edema of the buttocks and thigh bilaterally. I agree with the plan to order Solu-medrol, Pepcid and Benadryl and consult Dr. Suarez (cardiology) prior to disposition.    1252- Taniya (RN for cardiology) is at bedside.    1316- Discussed the pt's case with Dr. Suarez (cardiology), who agrees with the plan to stop the pt's isosorbide and requests that I start the pt on steroids.    EKG           EKG time: 1044  Rhythm/Rate: NSR with PACs, 64  QRS, axis: RBBB, LPFB      Interpreted Contemporaneously by me, independently viewed  changed compared to prior on 11-14-17 (conduction blocks are new but are noted to be on prior EKGs)    I supervised care provided by the midlevel provider.    We have discussed this patient's history, physical exam, and treatment plan.   I have reviewed the note and personally saw and examined the patient and agree with the plan of care.    Documentation assistance provided by kacie Garnica for Dr. Medrano.  Information recorded by the kacie was done at my direction and has been verified and validated by me.     Dary Garnica  11/20/17 1318       Raghavendra Medrano MD  11/20/17 2010

## 2017-11-22 ENCOUNTER — EPISODE CHANGES (OUTPATIENT)
Dept: CASE MANAGEMENT | Facility: OTHER | Age: 72
End: 2017-11-22

## 2017-12-06 ENCOUNTER — TELEPHONE (OUTPATIENT)
Dept: FAMILY MEDICINE CLINIC | Facility: CLINIC | Age: 72
End: 2017-12-06

## 2017-12-06 NOTE — TELEPHONE ENCOUNTER
Please define who [facility] it is there are a lot of people that run around there.  Patient was seen twice within hours at [facility].!  PS if this is left to me I wish to speak with risk management first thank you

## 2017-12-06 NOTE — TELEPHONE ENCOUNTER
[facility] STATES THAT [facility] SHOULD BE THE ONE SIGNING THE DEATH CERTIFICATE. PLEASE CALL TO SETTLE THIS MATTER.  [facility]  HOME [phone]

## (undated) DEVICE — BND PRESS RADL COMFRT 14IN STRL

## (undated) DEVICE — CATH DIAG IMPULSE FL4 5F 100CM

## (undated) DEVICE — Device: Brand: THERMOCOOL SF NAV

## (undated) DEVICE — 3M™ BAIR HUGGER® UNDERBODY BLANKET, ADULT, 10 PER CASE 54500: Brand: BAIR HUGGER™

## (undated) DEVICE — GW INQWIRE FC PTFE J/3MM .021 180

## (undated) DEVICE — CATH DIAG IMPULSE FR4 5F 100CM

## (undated) DEVICE — NC TREK CORONARY DILATATION CATHETER 3.0 MM X 15 MM / RAPID-EXCHANGE: Brand: NC TREK

## (undated) DEVICE — ELECTRD ECG CARBON/SNP RL FM A/ 5PK

## (undated) DEVICE — Device

## (undated) DEVICE — GLIDESHEATH BASIC HYDROPHILIC COATED INTRODUCER SHEATH: Brand: GLIDESHEATH

## (undated) DEVICE — KT MANIFLD CARDIAC

## (undated) DEVICE — GW EMR FIX EXCHG J STD .035 3MM 260CM

## (undated) DEVICE — Device: Brand: ISMUS

## (undated) DEVICE — BOOT WAFF HEEL CUST

## (undated) DEVICE — LOU EP: Brand: MEDLINE INDUSTRIES, INC.

## (undated) DEVICE — TREK CORONARY DILATATION CATHETER 3.0 MM X 15 MM / RAPID-EXCHANGE: Brand: TREK

## (undated) DEVICE — TR BAND RADIAL ARTERY COMPRESSION DEVICE: Brand: TR BAND

## (undated) DEVICE — NC TREK CORONARY DILATATION CATHETER 4.0 MM X 15 MM / RAPID-EXCHANGE: Brand: NC TREK

## (undated) DEVICE — GC 5F 056 XB 3.5 LBT: Brand: BRITE TIP

## (undated) DEVICE — CATH DIAG IMPULSE FL3.5 5F 100CM

## (undated) DEVICE — Device: Brand: REFERENCE PATCH CARTO 3

## (undated) DEVICE — CATH VENT MIV RADL PIG ST TIP 5F 110CM

## (undated) DEVICE — DEV INDEFLATOR

## (undated) DEVICE — XIENCE ALPINE EVEROLIMUS ELUTING CORONARY STENT SYSTEM 4.00 MM X 18 MM / RAPID-EXCHANGE
Type: IMPLANTABLE DEVICE | Status: NON-FUNCTIONAL
Brand: XIENCE ALPINE

## (undated) DEVICE — BALN PRESS WEDGE 5F 110CM

## (undated) DEVICE — Device: Brand: LASSO NAV

## (undated) DEVICE — Device: Brand: WEBSTER CS

## (undated) DEVICE — PREF.GUIDING SHEATH W/MULT.CRV: Brand: PREFACE

## (undated) DEVICE — SYS TRNSEP ACC ACROSS ADLT BRK1 71CM

## (undated) DEVICE — PK CATH CARD 40

## (undated) DEVICE — SHEATH INTRO FASTCATH 0.038GW 8F 12CM

## (undated) DEVICE — Device: Brand: SMARTABLATE

## (undated) DEVICE — HI-TORQUE BALANCE MIDDLEWEIGHT GUIDE WIRE .014 STRAIGHT TIP 3.0 CM X 190 CM: Brand: HI-TORQUE BALANCE MIDDLEWEIGHT

## (undated) DEVICE — HI-TORQUE BALANCE MIDDLEWEIGHT UNIVERSAL II GUIDE WIRE STRAIGHT TIP PAK  190 CM: Brand: HI-TORQUE BALANCE MIDDLEWEIGHT UNIVERSAL II